# Patient Record
Sex: MALE | Race: WHITE | NOT HISPANIC OR LATINO | Employment: OTHER | URBAN - METROPOLITAN AREA
[De-identification: names, ages, dates, MRNs, and addresses within clinical notes are randomized per-mention and may not be internally consistent; named-entity substitution may affect disease eponyms.]

---

## 2017-11-03 ENCOUNTER — RADIANT APPOINTMENT (OUTPATIENT)
Dept: GENERAL RADIOLOGY | Facility: CLINIC | Age: 62
End: 2017-11-03
Attending: FAMILY MEDICINE
Payer: COMMERCIAL

## 2017-11-03 ENCOUNTER — OFFICE VISIT (OUTPATIENT)
Dept: FAMILY MEDICINE | Facility: CLINIC | Age: 62
End: 2017-11-03
Payer: COMMERCIAL

## 2017-11-03 VITALS
BODY MASS INDEX: 28.67 KG/M2 | HEIGHT: 71 IN | TEMPERATURE: 97.7 F | SYSTOLIC BLOOD PRESSURE: 148 MMHG | RESPIRATION RATE: 24 BRPM | OXYGEN SATURATION: 94 % | DIASTOLIC BLOOD PRESSURE: 90 MMHG | WEIGHT: 204.8 LBS | HEART RATE: 89 BPM

## 2017-11-03 DIAGNOSIS — Z00.00 ROUTINE GENERAL MEDICAL EXAMINATION AT A HEALTH CARE FACILITY: Primary | ICD-10-CM

## 2017-11-03 DIAGNOSIS — R05.9 COUGH: ICD-10-CM

## 2017-11-03 DIAGNOSIS — Z11.59 NEED FOR HEPATITIS C SCREENING TEST: ICD-10-CM

## 2017-11-03 DIAGNOSIS — Z23 NEED FOR PROPHYLACTIC VACCINATION AND INOCULATION AGAINST INFLUENZA: ICD-10-CM

## 2017-11-03 DIAGNOSIS — J18.9 PNEUMONIA OF LEFT LOWER LOBE DUE TO INFECTIOUS ORGANISM: ICD-10-CM

## 2017-11-03 DIAGNOSIS — R03.0 ELEVATED BLOOD PRESSURE READING WITHOUT DIAGNOSIS OF HYPERTENSION: ICD-10-CM

## 2017-11-03 DIAGNOSIS — Z12.11 SCREEN FOR COLON CANCER: ICD-10-CM

## 2017-11-03 LAB
ERYTHROCYTE [DISTWIDTH] IN BLOOD BY AUTOMATED COUNT: 12.6 % (ref 10–15)
HCT VFR BLD AUTO: 49.8 % (ref 40–53)
HGB BLD-MCNC: 17.3 G/DL (ref 13.3–17.7)
MCH RBC QN AUTO: 30.9 PG (ref 26.5–33)
MCHC RBC AUTO-ENTMCNC: 34.7 G/DL (ref 31.5–36.5)
MCV RBC AUTO: 89 FL (ref 78–100)
PLATELET # BLD AUTO: 143 10E9/L (ref 150–450)
RBC # BLD AUTO: 5.6 10E12/L (ref 4.4–5.9)
WBC # BLD AUTO: 16.8 10E9/L (ref 4–11)

## 2017-11-03 PROCEDURE — 99213 OFFICE O/P EST LOW 20 MIN: CPT | Mod: 25 | Performed by: FAMILY MEDICINE

## 2017-11-03 PROCEDURE — 86803 HEPATITIS C AB TEST: CPT | Performed by: FAMILY MEDICINE

## 2017-11-03 PROCEDURE — 99396 PREV VISIT EST AGE 40-64: CPT | Performed by: FAMILY MEDICINE

## 2017-11-03 PROCEDURE — 80053 COMPREHEN METABOLIC PANEL: CPT | Performed by: FAMILY MEDICINE

## 2017-11-03 PROCEDURE — 85027 COMPLETE CBC AUTOMATED: CPT | Performed by: FAMILY MEDICINE

## 2017-11-03 PROCEDURE — 80061 LIPID PANEL: CPT | Performed by: FAMILY MEDICINE

## 2017-11-03 PROCEDURE — 71020 XR CHEST 2 VW: CPT

## 2017-11-03 PROCEDURE — 87522 HEPATITIS C REVRS TRNSCRPJ: CPT | Performed by: FAMILY MEDICINE

## 2017-11-03 PROCEDURE — 36415 COLL VENOUS BLD VENIPUNCTURE: CPT | Performed by: FAMILY MEDICINE

## 2017-11-03 RX ORDER — AZITHROMYCIN 250 MG/1
TABLET, FILM COATED ORAL
Qty: 6 TABLET | Refills: 0 | Status: SHIPPED | OUTPATIENT
Start: 2017-11-03 | End: 2017-11-13

## 2017-11-03 RX ORDER — ALBUTEROL SULFATE 90 UG/1
2 AEROSOL, METERED RESPIRATORY (INHALATION) EVERY 6 HOURS PRN
Qty: 1 INHALER | Refills: 0 | Status: ON HOLD | OUTPATIENT
Start: 2017-11-03 | End: 2018-09-21

## 2017-11-03 RX ORDER — PREDNISONE 20 MG/1
40 TABLET ORAL DAILY
Qty: 10 TABLET | Refills: 0 | Status: SHIPPED | OUTPATIENT
Start: 2017-11-03 | End: 2017-11-08

## 2017-11-03 ASSESSMENT — PAIN SCALES - GENERAL: PAINLEVEL: NO PAIN (0)

## 2017-11-03 NOTE — PROGRESS NOTES
"  SUBJECTIVE:   CC: Rk Longoria is an 62 year old male who presents for preventative health visit.     Healthy Habits:    Do you get at least three servings of calcium containing foods daily (dairy, green leafy vegetables, etc.)? {YES/NO, DAIRY INTAKE:165011::\"yes\"}    Amount of exercise or daily activities, outside of work: {AMOUNT EXERCISE:426178}    Problems taking medications regularly {Yes /No default:949459::\"No\"}    Medication side effects: {Yes /No default.:283993::\"No\"}    Have you had an eye exam in the past two years? {YESNOBLANK:371043}    Do you see a dentist twice per year? {YESNOBLANK:033169}    Do you have sleep apnea, excessive snoring or daytime drowsiness?{YESNOBLANK:288038}    {Outside tests to abstract? :696444}    {additional problems to add (Optional):790920}    Today's PHQ-2 Score:   PHQ-2 ( 1999 Pfizer) 11/3/2017 1/8/2014   Q1: Little interest or pleasure in doing things 2 0   Q2: Feeling down, depressed or hopeless 0 0   PHQ-2 Score 2 0   Q1: Little interest or pleasure in doing things More than half the days -   Q2: Feeling down, depressed or hopeless Not at all -   PHQ-2 Score 2 -     {PHQ-2 LOOK IN ASSESSMENTS :444074}  Abuse: Current or Past(Physical, Sexual or Emotional)- {YES/NO/NA:516623}  Do you feel safe in your environment - {YES/NO/NA:685966}    Social History   Substance Use Topics     Smoking status: Current Every Day Smoker     Packs/day: 1.50     Smokeless tobacco: Never Used     Alcohol use No     {ETOH AUDIT:301380}    Last PSA: No results found for: PSA    Reviewed orders with patient. Reviewed health maintenance and updated orders accordingly - {Yes/No:222352::\"Yes\"}  {Chronicprobdata (Optional):919259}    Reviewed and updated as needed this visit by clinical staff         Reviewed and updated as needed this visit by Provider        {HISTORY OPTIONS (Optional):088438}    ROS:  {MALE ROS-adult preventive care package:814607::\"C: NEGATIVE for fever, chills, change in " "weight\",\"I: NEGATIVE for worrisome rashes, moles or lesions\",\"E: NEGATIVE for vision changes or irritation\",\"ENT: NEGATIVE for ear, mouth and throat problems\",\"R: NEGATIVE for significant cough or SOB\",\"CV: NEGATIVE for chest pain, palpitations or peripheral edema\",\"GI: NEGATIVE for nausea, abdominal pain, heartburn, or change in bowel habits\",\" male: negative for dysuria, hematuria, decreased urinary stream, erectile dysfunction, urethral discharge\",\"M: NEGATIVE for significant arthralgias or myalgia\",\"N: NEGATIVE for weakness, dizziness or paresthesias\",\"P: NEGATIVE for changes in mood or affect\"}    OBJECTIVE:   There were no vitals taken for this visit.  EXAM:  {Exam Choices:039015}    ASSESSMENT/PLAN:   {Diag Picklist:413124}    COUNSELING:  {MALE COUNSELING MESSAGES:410307::\"Reviewed preventive health counseling, as reflected in patient instructions\"}    {BP Counseling- Complete if BP >= 120/80  (Optional):097925}     reports that he has been smoking.  He has been smoking about 1.50 packs per day. He has never used smokeless tobacco.  {Tobacco Cessation -- Complete if patient is a smoker (Optional):369334}  Estimated body mass index is 29.29 kg/(m^2) as calculated from the following:    Height as of 8/26/16: 5' 11\" (1.803 m).    Weight as of 8/26/16: 210 lb (95.3 kg).   {Weight Management Plan (ACO) Complete if BMI is abnormal-  Ages 18-64  BMI >24.9.  Age 65+ with BMI <23 or >30 (Optional):937362}    Counseling Resources:  ATP IV Guidelines  Pooled Cohorts Equation Calculator  FRAX Risk Assessment  ICSI Preventive Guidelines  Dietary Guidelines for Americans, 2010  USDA's MyPlate  ASA Prophylaxis  Lung CA Screening    Darrin Mendez MD  [facility]  "

## 2017-11-03 NOTE — PROGRESS NOTES
SUBJECTIVE:   CC: Rk Longoria is an 62 year old male who presents for preventative health visit.     Physical   Annual:     Getting at least 3 servings of Calcium per day::  Yes    Bi-annual eye exam::  NO    Dental care twice a year::  NO    Sleep apnea or symptoms of sleep apnea::  Daytime drowsiness    Taking medications regularly::  Yes    Medication side effects::  None    Acute Illness   Acute illness concerns: cough  Onset: at least 1 week    Fever: YES    Chills/Sweats: YES    Headache (location?): YES    Sinus Pressure:no    Conjunctivitis:  no    Ear Pain: no    Rhinorrhea: YES    Congestion: YES- chest     Sore Throat: YES- occasionally      Cough: YES-productive of clear sputum    Wheeze: YES    Decreased Appetite: no    Nausea: no    Vomiting: no    Diarrhea:  no    Dysuria/Freq.: no    Fatigue/Achiness: YES    Sick/Strep Exposure: no     Therapies Tried and outcome: vitamin BHARGAV     Has had pneumonia before, feels similar.  Did recently have flu shot, multiple illness in the home over the last few weeks.  Feeling poorly for the last 8 days.  Feeling warm and feverish, unsure if derrek fever.  Does feel a bit short of breath, no wheezing.  Markedly low energy.      Does smoke, trying to cut back.    Today's PHQ-2 Score:   PHQ-2 ( 1999 Pfizer) 11/3/2017   Q1: Little interest or pleasure in doing things 2   Q2: Feeling down, depressed or hopeless 0   PHQ-2 Score 2   Q1: Little interest or pleasure in doing things More than half the days   Q2: Feeling down, depressed or hopeless Not at all   PHQ-2 Score 2   Abuse: Current or Past(Physical, Sexual or Emotional)- NO  Do you feel safe in your environment - YES    Social History   Substance Use Topics     Smoking status: Current Every Day Smoker     Packs/day: 1.50     Smokeless tobacco: Never Used     Alcohol use No     The patient does not drink >3 drinks per day nor >7 drinks per week.    Last PSA: No results found for: PSA    Reviewed orders with patient.  "Reviewed health maintenance and updated orders accordingly - Yes  Labs reviewed in EPIC    Reviewed and updated as needed this visit by clinical staff  Tobacco  Allergies  Meds  Problems  Med Hx  Surg Hx  Fam Hx  Soc Hx          Reviewed and updated as needed this visit by Provider            Review of Systems  CONSTITUTIONAL:fatigue  I: NEGATIVE for worrisome rashes, moles or lesions  E: NEGATIVE for vision changes or irritation  ENT: NEGATIVE for ear, mouth and throat problems  RESP:NEGATIVE for significant cough or SOB and POSITIVE for cough-productive, dyspnea on exertion and wheezing  CV: NEGATIVE for chest pain, palpitations or peripheral edema  GI: NEGATIVE for nausea, abdominal pain, heartburn, or change in bowel habits   male: negative for dysuria, hematuria, decreased urinary stream, erectile dysfunction, urethral discharge  M: NEGATIVE for significant arthralgias or myalgia  N: NEGATIVE for weakness, dizziness or paresthesias  P: NEGATIVE for changes in mood or affect    OBJECTIVE:   /90 (BP Location: Right arm, Patient Position: Chair, Cuff Size: Adult Regular)  Pulse 89  Temp 97.7  F (36.5  C) (Oral)  Resp 24  Ht 5' 11\" (1.803 m)  Wt 204 lb 12.8 oz (92.9 kg)  SpO2 94%  BMI 28.56 kg/m2    Physical Exam  GENERAL: healthy, alert and no distress  EYES: Eyes grossly normal to inspection, PERRL and conjunctivae and sclerae normal  HENT: ear canals and TM's normal, nose and mouth without ulcers or lesions  NECK: no adenopathy, no asymmetry, masses, or scars and thyroid normal to palpation  RESP:  rales L lower posterior, inspiratory wheezes bibasilar and decreased breath sounds L lower posterior  CV: regular rate and rhythm, normal S1 S2, no S3 or S4, no murmur, click or rub, no peripheral edema and peripheral pulses strong  ABDOMEN: soft, nontender, no hepatosplenomegaly, no masses and bowel sounds normal  MS: no gross musculoskeletal defects noted, no edema  SKIN: no suspicious lesions " or rashes  NEURO: Normal strength and tone, mentation intact and speech normal  PSYCH: mentation appears normal, affect normal/bright    ASSESSMENT/PLAN:   1. Screen for colon cancer    - Fecal colorectal cancer screen FIT - Future (S+30); Future    2. Need for hepatitis C screening test    - Hepatitis C Screen Reflex to HCV RNA Quant and Genotype    3. Need for prophylactic vaccination and inoculation against influenza  Recently done    4. Routine general medical examination at a health care facility    - Lipid panel reflex to direct LDL Fasting  - Comprehensive metabolic panel  - CBC with platelets    5. Cough  LLL PNA  - XR Chest 2 Views; Future  - OFFICE/OUTPT VISIT,EST,LEVL III    6. Pneumonia of left lower lobe due to infectious organism (H)  Suspect that he has underlying COPD given smokig history  - OFFICE/OUTPT VISIT,EST,LEVL III  - azithromycin (ZITHROMAX) 250 MG tablet; Two tablets first day, then one tablet daily for four days.  Dispense: 6 tablet; Refill: 0  - predniSONE (DELTASONE) 20 MG tablet; Take 2 tablets (40 mg) by mouth daily for 5 days  Dispense: 10 tablet; Refill: 0  - albuterol (PROAIR HFA/PROVENTIL HFA/VENTOLIN HFA) 108 (90 BASE) MCG/ACT Inhaler; Inhale 2 puffs into the lungs every 6 hours as needed for shortness of breath / dyspnea or wheezing  Dispense: 1 Inhaler; Refill: 0    7. Elevated blood pressure reading without diagnosis of hypertension    - OFFICE/OUTPT VISIT,EST,LEVL III    COUNSELING:   Reviewed preventive health counseling, as reflected in patient instructions    BP Screening:   Last 3 BP Readings:    BP Readings from Last 3 Encounters:   11/03/17 148/90   08/26/16 (!) 152/94   09/05/14 132/84       The following was recommended to the patient:  Re-screen within 4 weeks and recommend lifestyle modifications       reports that he has been smoking.  He has been smoking about 1.50 packs per day. He has never used smokeless tobacco.  Tobacco Cessation Action Plan: Information  "offered: Patient not interested at this time  Estimated body mass index is 28.56 kg/(m^2) as calculated from the following:    Height as of this encounter: 5' 11\" (1.803 m).    Weight as of this encounter: 204 lb 12.8 oz (92.9 kg).         Counseling Resources:  ATP IV Guidelines  Pooled Cohorts Equation Calculator  FRAX Risk Assessment  ICSI Preventive Guidelines  Dietary Guidelines for Americans, 2010  USDA's MyPlate  ASA Prophylaxis  Lung CA Screening    Darrin Mendez MD  [facility]  "

## 2017-11-03 NOTE — NURSING NOTE
"Chief Complaint   Patient presents with     Physical     Initial /90 (BP Location: Right arm, Patient Position: Chair, Cuff Size: Adult Regular)  Pulse 89  Temp 97.7  F (36.5  C) (Oral)  Resp 24  Ht 5' 11\" (1.803 m)  Wt 204 lb 12.8 oz (92.9 kg)  SpO2 94%  BMI 28.56 kg/m2 Estimated body mass index is 28.56 kg/(m^2) as calculated from the following:    Height as of this encounter: 5' 11\" (1.803 m).    Weight as of this encounter: 204 lb 12.8 oz (92.9 kg).  BP completed using cuff size regular right arm.    Lisa Magill, CMA    "

## 2017-11-03 NOTE — MR AVS SNAPSHOT
After Visit Summary   11/3/2017    Rk Longroia    MRN: [id number]           Patient Information     Date Of Birth          1955        Visit Information        Provider Department      11/3/2017 1:20 PM Darrin Mendez MD [facility]        Today's Diagnoses     Routine general medical examination at a health care facility    -  1    Screen for colon cancer        Need for hepatitis C screening test        Need for prophylactic vaccination and inoculation against influenza        Cough        Pneumonia of left lower lobe due to infectious organism (H)        Elevated blood pressure reading without diagnosis of hypertension          Care Instructions      Preventive Health Recommendations  Male Ages 50 - 64    Yearly exam:             See your health care provider every year in order to  o   Review health changes.   o   Discuss preventive care.    o   Review your medicines if your doctor has prescribed any.     Have a cholesterol test every 5 years, or more frequently if you are at risk for high cholesterol/heart disease.     Have a diabetes test (fasting glucose) every three years. If you are at risk for diabetes, you should have this test more often.     Have a colonoscopy at age 50, or have a yearly FIT test (stool test). These exams will check for colon cancer.      Talk with your health care provider about whether or not a prostate cancer screening test (PSA) is right for you.    You should be tested each year for STDs (sexually transmitted diseases), if you re at risk.     Shots: Get a flu shot each year. Get a tetanus shot every 10 years.     Nutrition:    Eat at least 5 servings of fruits and vegetables daily.     Eat whole-grain bread, whole-wheat pasta and brown rice instead of white grains and rice.     Talk to your provider about Calcium and Vitamin D.     Lifestyle    Exercise for at least 150 minutes a week (30 minutes a day, 5 days a week). This will help  "you control your weight and prevent disease.     Limit alcohol to one drink per day.     No smoking.     Wear sunscreen to prevent skin cancer.     See your dentist every six months for an exam and cleaning.     See your eye doctor every 1 to 2 years.            Follow-ups after your visit        Follow-up notes from your care team     Return in about 2 weeks (around 11/17/2017).      Future tests that were ordered for you today     Open Future Orders        Priority Expected Expires Ordered    Fecal colorectal cancer screen FIT - Future (S+30) Routine 11/24/2017 12/3/2017 11/3/2017            Who to contact     If you have questions or need follow up information about today's clinic visit or your schedule please contact [facility] directly at [phone].  Normal or non-critical lab and imaging results will be communicated to you by [employer], letter or phone within 4 business days after the clinic has received the results. If you do not hear from us within 7 days, please contact the clinic through [employer] or phone. If you have a critical or abnormal lab result, we will notify you by phone as soon as possible.  Submit refill requests through [employer] or call your pharmacy and they will forward the refill request to us. Please allow 3 business days for your refill to be completed.          Additional Information About Your Visit        [employer] Information     [employer] lets you send messages to your doctor, view your test results, renew your prescriptions, schedule appointments and more. To sign up, go to [email or web address] . Click on \"Log in\" on the left side of the screen, which will take you to the Welcome page. Then click on \"Sign up Now\" on the right side of the page.     You will be asked to enter the access code listed below, as well as some personal information. Please follow the directions to create your username and password.     Your access code is: [email or web address]  Expires: 2/1/2018  2:16 " "PM     Your access code will  in 90 days. If you need help or a new code, please call your [facility] clinic or [phone].        Care EveryWhere ID     This is your Care EveryWhere ID. This could be used by other organizations to access your [facility] medical records  [id number]        Your Vitals Were     Pulse Temperature Respirations Height Pulse Oximetry BMI (Body Mass Index)    89 97.7  F (36.5  C) (Oral) 24 5' 11\" (1.803 m) 94% 28.56 kg/m2       Blood Pressure from Last 3 Encounters:   17 148/90   16 (!) 152/94   14 132/84    Weight from Last 3 Encounters:   17 204 lb 12.8 oz (92.9 kg)   16 210 lb (95.3 kg)   14 204 lb (92.5 kg)              We Performed the Following     CBC with platelets     Comprehensive metabolic panel     Hepatitis C Screen Reflex to HCV RNA Quant and Genotype     Lipid panel reflex to direct LDL Fasting     OFFICE/OUTPT VISIT,RONA ALEX III          Today's Medication Changes          These changes are accurate as of: 11/3/17  2:16 PM.  If you have any questions, ask your nurse or doctor.               Start taking these medicines.        Dose/Directions    albuterol 108 (90 BASE) MCG/ACT Inhaler   Commonly known as:  PROAIR HFA/PROVENTIL HFA/VENTOLIN HFA   Used for:  Pneumonia of left lower lobe due to infectious organism (H)   Started by:  Darrin Mendez MD        Dose:  2 puff   Inhale 2 puffs into the lungs every 6 hours as needed for shortness of breath / dyspnea or wheezing   Quantity:  1 Inhaler   Refills:  0       azithromycin 250 MG tablet   Commonly known as:  ZITHROMAX   Used for:  Pneumonia of left lower lobe due to infectious organism (H)   Started by:  Darrin Mendez MD        Two tablets first day, then one tablet daily for four days.   Quantity:  6 tablet   Refills:  0       predniSONE 20 MG tablet   Commonly known as:  DELTASONE   Used for:  Pneumonia of left lower lobe due to infectious organism (H)   Started " by:  Darrin Mendez MD        Dose:  40 mg   Take 2 tablets (40 mg) by mouth daily for 5 days   Quantity:  10 tablet   Refills:  0            Where to get your medicines      These medications were sent to [id number] - [place], [place] - [address]  [address] [facility] [address]     Phone:  [phone]     albuterol 108 (90 BASE) MCG/ACT Inhaler    azithromycin 250 MG tablet    predniSONE 20 MG tablet                Primary Care Provider Office Phone # Fax #    Darrin Mendez [phone] [address] [facility] [address]        Equal Access to Services     [facility]: Hadii aad ku hadasho Soomaali, waaxda luqadaha, qaybta kaalmada adeegyada, waxsarah cano . So [facility] [phone].    ATENCIÓN: Si habla español, tiene a shipley disposición servicios gratuitos de asistencia lingüística. [facility] [phone].    We comply with applicable federal civil rights laws and [place] laws. We do not discriminate on the basis of race, color, national origin, age, disability, sex, sexual orientation, or gender identity.            Thank you!     Thank you for choosing [facility]  for your care. Our goal is always to provide you with excellent care. Hearing back from our patients is one way we can continue to improve our services. Please take a few minutes to complete the written survey that you may receive in the mail after your visit with us. Thank you!             Your Updated Medication List - Protect others around you: Learn how to safely use, store and throw away your medicines at www.disposemymeds.org.          This list is accurate as of: 11/3/17  2:16 PM.  Always use your most recent med list.                   Brand Name Dispense Instructions for use Diagnosis    albuterol 108 (90 BASE) MCG/ACT Inhaler    PROAIR HFA/PROVENTIL HFA/VENTOLIN HFA    1 Inhaler    Inhale 2 puffs into the lungs every 6 hours as  needed for shortness of breath / dyspnea or wheezing    Pneumonia of left lower lobe due to infectious organism (H)       aspirin 325 MG tablet      Take by mouth daily        azithromycin 250 MG tablet    ZITHROMAX    6 tablet    Two tablets first day, then one tablet daily for four days.    Pneumonia of left lower lobe due to infectious organism (H)       calcium carbonate-vitamin D 600-400 MG-UNIT Chew      Take by mouth 2 times daily        predniSONE 20 MG tablet    DELTASONE    10 tablet    Take 2 tablets (40 mg) by mouth daily for 5 days    Pneumonia of left lower lobe due to infectious organism (H)       TYLENOL 500 MG tablet   Generic drug:  acetaminophen      Take 500-1,000 mg by mouth every 6 hours as needed        vitamin D3 14733 UNITS capsule    CHOLECALCIFEROL    12 capsule    Take 1 capsule (50,000 Units) by mouth once a week    Unspecified vitamin D deficiency

## 2017-11-04 LAB
ALBUMIN SERPL-MCNC: 4 G/DL (ref 3.4–5)
ALP SERPL-CCNC: 81 U/L (ref 40–150)
ALT SERPL W P-5'-P-CCNC: 29 U/L (ref 0–70)
ANION GAP SERPL CALCULATED.3IONS-SCNC: 8 MMOL/L (ref 3–14)
AST SERPL W P-5'-P-CCNC: 22 U/L (ref 0–45)
BILIRUB SERPL-MCNC: 1 MG/DL (ref 0.2–1.3)
BUN SERPL-MCNC: 14 MG/DL (ref 7–30)
CALCIUM SERPL-MCNC: 9.2 MG/DL (ref 8.5–10.1)
CHLORIDE SERPL-SCNC: 102 MMOL/L (ref 94–109)
CHOLEST SERPL-MCNC: 156 MG/DL
CO2 SERPL-SCNC: 26 MMOL/L (ref 20–32)
CREAT SERPL-MCNC: 0.66 MG/DL (ref 0.66–1.25)
GFR SERPL CREATININE-BSD FRML MDRD: >90 ML/MIN/1.7M2
GLUCOSE SERPL-MCNC: 328 MG/DL (ref 70–99)
HDLC SERPL-MCNC: 38 MG/DL
LDLC SERPL CALC-MCNC: 78 MG/DL
NONHDLC SERPL-MCNC: 118 MG/DL
POTASSIUM SERPL-SCNC: 4.4 MMOL/L (ref 3.4–5.3)
PROT SERPL-MCNC: 8.2 G/DL (ref 6.8–8.8)
SODIUM SERPL-SCNC: 136 MMOL/L (ref 133–144)
TRIGL SERPL-MCNC: 202 MG/DL

## 2017-11-06 LAB — HCV AB SERPL QL IA: REACTIVE

## 2017-11-07 DIAGNOSIS — J18.9 PNEUMONIA OF LEFT LOWER LOBE DUE TO INFECTIOUS ORGANISM: ICD-10-CM

## 2017-11-07 NOTE — TELEPHONE ENCOUNTER
Pt calls requesting refill on his azithromycin for another 5 or 6 days. He states he is feeling much better after the abx and inhaler. Informed he may be asked to come in for an OV for the abx refill.    Ken Kan   11/07/17 10:23 AM

## 2017-11-08 RX ORDER — AZITHROMYCIN 250 MG/1
TABLET, FILM COATED ORAL
Qty: 6 TABLET | Refills: 0 | OUTPATIENT
Start: 2017-11-08

## 2017-11-08 NOTE — TELEPHONE ENCOUNTER
Disp Refills Start End DENEEN   azithromycin (ZITHROMAX) 250 MG tablet 6 tablet 0 11/3/2017  --   Sig: Two tablets first day, then one tablet daily for four days.     Requesting refill    Route to provider to review and advise    Martir RN Nurse Triage

## 2017-11-08 NOTE — TELEPHONE ENCOUNTER
Azithromycin remains active in the body for several days after Rx.  If still having issues pt will need to f/u.    Darrin Mendez MD

## 2017-11-11 LAB
HCV RNA SERPL NAA+PROBE-ACNC: ABNORMAL [IU]/ML
HCV RNA SERPL NAA+PROBE-LOG IU: 5.9 LOG IU/ML

## 2017-11-13 ENCOUNTER — OFFICE VISIT (OUTPATIENT)
Dept: FAMILY MEDICINE | Facility: CLINIC | Age: 62
End: 2017-11-13
Payer: COMMERCIAL

## 2017-11-13 VITALS
HEART RATE: 76 BPM | BODY MASS INDEX: 28.42 KG/M2 | SYSTOLIC BLOOD PRESSURE: 128 MMHG | WEIGHT: 203.8 LBS | TEMPERATURE: 98 F | RESPIRATION RATE: 20 BRPM | DIASTOLIC BLOOD PRESSURE: 78 MMHG

## 2017-11-13 DIAGNOSIS — B18.2 CHRONIC HEPATITIS C WITHOUT HEPATIC COMA (H): ICD-10-CM

## 2017-11-13 DIAGNOSIS — R73.9 ELEVATED BLOOD SUGAR: Primary | ICD-10-CM

## 2017-11-13 DIAGNOSIS — E11.65 TYPE 2 DIABETES MELLITUS WITH HYPERGLYCEMIA, WITHOUT LONG-TERM CURRENT USE OF INSULIN (H): ICD-10-CM

## 2017-11-13 LAB — HBA1C MFR BLD: 10.1 % (ref 4.3–6)

## 2017-11-13 PROCEDURE — 36415 COLL VENOUS BLD VENIPUNCTURE: CPT | Performed by: FAMILY MEDICINE

## 2017-11-13 PROCEDURE — 99214 OFFICE O/P EST MOD 30 MIN: CPT | Performed by: FAMILY MEDICINE

## 2017-11-13 PROCEDURE — 83036 HEMOGLOBIN GLYCOSYLATED A1C: CPT | Performed by: FAMILY MEDICINE

## 2017-11-13 RX ORDER — LISINOPRIL 2.5 MG/1
2.5 TABLET ORAL DAILY
Qty: 90 TABLET | Refills: 1 | Status: SHIPPED | OUTPATIENT
Start: 2017-11-13 | End: 2018-05-10

## 2017-11-13 NOTE — MR AVS SNAPSHOT
After Visit Summary   11/13/2017    Rk Longoria    MRN: [id number]           Patient Information     Date Of Birth          1955        Visit Information        Provider Department      11/13/2017 10:20 AM Darrin Mendez MD [facility]        Today's Diagnoses     Elevated blood sugar    -  1    Type 2 diabetes mellitus with hyperglycemia, without long-term current use of insulin (H)        Chronic hepatitis C without hepatic coma (H)           Follow-ups after your visit        Additional Services     DIABETES EDUCATOR REFERRAL       Your provider has referred you to Diabetes Education: FMG: Diabetes Education - All [facility] [phone]   [email or web address]     If an urgent visit is needed or A1C is above 12, Care Team to call the Diabetes  Education Team at [phone] or send an In Basket message to the Diabetes Education Pool (P DIAB ED-PATIENT CARE).    This is a New Diagnosis: Initial group DSMT - 10 hours.    Type of diabetes is Type 2 - On Oral Medication                                                          A1C is: Lab Results       Component                Value               Date                       A1C                      10.1                11/13/2017            If an urgent visit is needed or A1C is above 12, Care Team to call the diabetes education team at [phone] or send a message to the diabetes education pool (P DIAB ED-PATIENT CARE).    Diabetes education focus: Comprehensive Knowledge Assessment and Instruction      Education needs: None                                                                                                                                                      Please be aware that coverage of these services is subject to the terms and limitations of your health insurance plan.  Call member services at your health plan to determine Diabetes Self-Management  Training benefits and ask which blood glucose monitor brands are covered by your plan.      Please bring the following to your appointment:    -   List of current medications   -   List of blood glucose monitor brands that are covered by your insurance plan  -   Blood glucose monitor and log book  -   Food records for the 3 days prior to your visit            GASTROENTEROLOGY ADULT REF CONSULT ONLY       Preferred Location: [place] [facility] [phone]      Please be aware that coverage of these services is subject to the terms and limitations of your health insurance plan.  Call member services at your health plan with any benefit or coverage questions.  Any procedures must be performed at a [facility] facility OR coordinated by your clinic's referral office.    Please bring the following with you to your appointment:    (1) Any X-Rays, CTs or MRIs which have been performed.  Contact the facility where they were done to arrange for  prior to your scheduled appointment.    (2) List of current medications   (3) This referral request   (4) Any documents/labs given to you for this referral                  Follow-up notes from your care team     Return in about 1 month (around 12/13/2017).      Who to contact     If you have questions or need follow up information about today's clinic visit or your schedule please contact [facility] directly at [phone].  Normal or non-critical lab and imaging results will be communicated to you by MyChart, letter or phone within 4 business days after the clinic has received the results. If you do not hear from us within 7 days, please contact the clinic through MyChart or phone. If you have a critical or abnormal lab result, we will notify you by phone as soon as possible.  Submit refill requests through [facility] [employer] or call your pharmacy and they will forward the refill request to us. Please allow 3 business days for your refill to be completed.          Additional  "Information About Your Visit        MyChart Information     Pulse lets you send messages to your doctor, view your test results, renew your prescriptions, schedule appointments and more. To sign up, go to [email or web address] . Click on \"Log in\" on the left side of the screen, which will take you to the Welcome page. Then click on \"Sign up Now\" on the right side of the page.     You will be asked to enter the access code listed below, as well as some personal information. Please follow the directions to create your username and password.     Your access code is: [email or web address]  Expires: 2018  1:16 PM     Your access code will  in 90 days. If you need help or a new code, please call your [place] clinic or [phone].        Care EveryWhere ID     This is your Care EveryWhere ID. This could be used by other organizations to access your [place] medical records  [id number]        Your Vitals Were     Pulse Temperature Respirations BMI (Body Mass Index)          76 98  F (36.7  C) (Oral) 20 28.42 kg/m2         Blood Pressure from Last 3 Encounters:   17 128/78   17 148/90   16 (!) 152/94    Weight from Last 3 Encounters:   17 203 lb 12.8 oz (92.4 kg)   17 204 lb 12.8 oz (92.9 kg)   16 210 lb (95.3 kg)              We Performed the Following     DIABETES EDUCATOR REFERRAL     GASTROENTEROLOGY ADULT REF CONSULT ONLY     Hemoglobin A1c          Today's Medication Changes          These changes are accurate as of: 17 11:34 AM.  If you have any questions, ask your nurse or doctor.               Start taking these medicines.        Dose/Directions    lisinopril 2.5 MG tablet   Commonly known as:  PRINIVIL/Zestril   Used for:  Type 2 diabetes mellitus with hyperglycemia, without long-term current use of insulin (H)   Started by:  Darrin Mendez MD        Dose:  2.5 mg   Take 1 tablet (2.5 mg) by mouth daily   Quantity:  90 tablet   Refills:  1       metFORMIN " 500 MG tablet   Commonly known as:  GLUCOPHAGE   Used for:  Type 2 diabetes mellitus with hyperglycemia, without long-term current use of insulin (H)   Started by:  Darrin Mendez MD        Dose:  1000 mg   Take 2 tablets (1,000 mg) by mouth 2 times daily (with meals)   Quantity:  360 tablet   Refills:  1            Where to get your medicines      These medications were sent to [id number] - [place], [place] - [address]  [address] [address] [facility] [address]     Phone:  [phone]     lisinopril 2.5 MG tablet    metFORMIN 500 MG tablet                Primary Care Provider Office Phone # Fax #    Darrin Mendez [phone] [address]  [facility] [address]        Equal Access to Services     SHANTAL JOSUE : Hadii kings pablo hadasho Soomaali, waaxda luqadaha, qaybta kaalmada adeegyada, wilfrid taylor hayneeta cano . So [facility] [phone].    ATENCIÓN: Si habla español, tiene a shipley disposición servicios gratuitos de asistencia lingüística. Llame al [phone].    We comply with applicable federal civil rights laws and [place] laws. We do not discriminate on the basis of race, color, national origin, age, disability, sex, sexual orientation, or gender identity.            Thank you!     Thank you for choosing [facility]  for your care. Our goal is always to provide you with excellent care. Hearing back from our patients is one way we can continue to improve our services. Please take a few minutes to complete the written survey that you may receive in the mail after your visit with us. Thank you!             Your Updated Medication List - Protect others around you: Learn how to safely use, store and throw away your medicines at www.disposemymeds.org.          This list is accurate as of: 11/13/17 11:34 AM.  Always use your most recent med list.                   Brand Name Dispense Instructions for use Diagnosis    albuterol 108  (90 BASE) MCG/ACT Inhaler    PROAIR HFA/PROVENTIL HFA/VENTOLIN HFA    1 Inhaler    Inhale 2 puffs into the lungs every 6 hours as needed for shortness of breath / dyspnea or wheezing    Pneumonia of left lower lobe due to infectious organism (H)       aspirin 325 MG tablet      Take by mouth daily        calcium carbonate-vitamin D 600-400 MG-UNIT Chew      Take by mouth 2 times daily        lisinopril 2.5 MG tablet    PRINIVIL/Zestril    90 tablet    Take 1 tablet (2.5 mg) by mouth daily    Type 2 diabetes mellitus with hyperglycemia, without long-term current use of insulin (H)       metFORMIN 500 MG tablet    GLUCOPHAGE    360 tablet    Take 2 tablets (1,000 mg) by mouth 2 times daily (with meals)    Type 2 diabetes mellitus with hyperglycemia, without long-term current use of insulin (H)       TYLENOL 500 MG tablet   Generic drug:  acetaminophen      Take 500-1,000 mg by mouth every 6 hours as needed

## 2017-11-13 NOTE — NURSING NOTE
"Chief Complaint   Patient presents with     Results     follow up for positive lab test       Initial /78  Pulse 76  Temp 98  F (36.7  C) (Oral)  Resp 20  Wt 203 lb 12.8 oz (92.4 kg)  BMI 28.42 kg/m2 Estimated body mass index is 28.42 kg/(m^2) as calculated from the following:    Height as of 11/3/17: 5' 11\" (1.803 m).    Weight as of this encounter: 203 lb 12.8 oz (92.4 kg).  Medication Reconciliation: complete   Nubia Soto CMA (AAMA)      "

## 2017-11-13 NOTE — PROGRESS NOTES
HPI    SUBJECTIVE:   Rk Longoria is a 62 year old male who presents to clinic today for the following health issues:    Lab results    Recovering alcoholic - sober for 10 years.  Also lots of drugs for a while - injected cocaine.  Never shared a needle, but did share a spoon.  Feeling well, no acute conerns.  No abd pain, n/v.    Does feel he's more thristy than not, drinks a lot.  Does urinate a lot.  Up once nightly to urinate.  No n/v, abd pain, tingling/numbness in hands/feet.  Does walk dog nearly daily., but less in the winter.    Family doesn't communicate about health well, thinks brother has diabetes, but is not sure.  MGM also had.  Not parents (as far as he knows).    Review of Systems   Constitutional: Negative.    Respiratory: Negative.    Cardiovascular: Negative.    Gastrointestinal: Negative for abdominal pain.   Genitourinary: Positive for frequency.   Endo/Heme/Allergies: Positive for polydipsia.         Physical Exam   Constitutional: He is oriented to person, place, and time and well-developed, well-nourished, and in no distress.   Eyes: Conjunctivae and EOM are normal. No scleral icterus.   Cardiovascular: Normal rate, regular rhythm and normal heart sounds.    Pulmonary/Chest: Effort normal and breath sounds normal.   Musculoskeletal: He exhibits no edema.   Neurological: He is alert and oriented to person, place, and time.   Skin: Skin is warm and dry.   Vitals reviewed.    (R73.9) Elevated blood sugar  (primary encounter diagnosis)  Comment: cross check  Plan: Hemoglobin A1c            (E11.65) Type 2 diabetes mellitus with hyperglycemia, without long-term current use of insulin (H)  Comment: Confirmed by A1c.  Starting on meds, advised to start ASA, starting low dose ACE-I.  Arrangeed for DM ed.  Will follow up in 1m to review new meds, start statin  Plan: metFORMIN (GLUCOPHAGE) 500 MG tablet,         lisinopril (PRINIVIL/ZESTRIL) 2.5 MG tablet,         DIABETES EDUCATOR REFERRAL,            (B18.2) Chronic hepatitis C without hepatic coma (H)  Comment: confrimed active infection through viral load.  Awaiting serotyping, referred to GI  Plan: GASTROENTEROLOGY         ADULT REF CONSULT ONLY        RTC in     Darrin Mendez MD

## 2017-11-17 ASSESSMENT — ENCOUNTER SYMPTOMS
CARDIOVASCULAR NEGATIVE: 1
CONSTITUTIONAL NEGATIVE: 1
RESPIRATORY NEGATIVE: 1
FREQUENCY: 1
POLYDIPSIA: 1
ABDOMINAL PAIN: 0

## 2017-11-27 DIAGNOSIS — E11.65 TYPE 2 DIABETES MELLITUS WITH HYPERGLYCEMIA, WITHOUT LONG-TERM CURRENT USE OF INSULIN (H): Primary | ICD-10-CM

## 2017-11-27 NOTE — TELEPHONE ENCOUNTER
[facility] calling     Pt requesting BS testing meter and testing supplies     He would like this before OV with DM educator on 11/29.      OK for this RX?    Margi Guzman, RN

## 2017-11-29 ENCOUNTER — ALLIED HEALTH/NURSE VISIT (OUTPATIENT)
Dept: EDUCATION SERVICES | Facility: CLINIC | Age: 62
End: 2017-11-29
Payer: COMMERCIAL

## 2017-11-29 DIAGNOSIS — E11.9 DIABETES MELLITUS, TYPE 2 (H): Primary | ICD-10-CM

## 2017-11-29 PROCEDURE — G0108 DIAB MANAGE TRN  PER INDIV: HCPCS | Performed by: DIETITIAN, REGISTERED

## 2017-11-29 NOTE — PROGRESS NOTES
"Diabetes Self Management Training: Initial Assessment Visit for Newly Diagnosed Patients (Complete AADE Goals Flowsheet)    Rk Longoria presents today for education related to Type 2 diabetes.    He is accompanied by son    Patient's diabetes management related comments/concerns: has completely changed his diet, is following Coy's diet    Patient's emotional response to diabetes: expresses readiness to learn and concern for health and well-being    Patient would like this visit to be focused around the following diabetes-related behaviors and goals: Healthy Eating and Monitoring    ASSESSMENT:  Patient Problem List and Family Medical History reviewed for relevant medical history, current medical status, and diabetes risk factors.    Current Diabetes Management per Patient:      Diabetes Medication(s)     Biguanides Sig    metFORMIN (GLUCOPHAGE) 500 MG tablet Take 2 tablets (1,000 mg) by mouth 2 times daily (with meals)        Side effects? Yes - some GI discomfort in the morning  Currently taking 1 tab with breakfast and 2 tabs with evening meal    Past Diabetes Education: Newly diagnosed    Patient glucose self monitoring as follows: pt bought a meter on own- reviewed technique today   BG meter: OTC from [employer]   BG result at today's visit: 107 mg/dl     BG values are: unable to assess  Patient's most recent   Lab Results   Component Value Date    A1C 10.1 11/13/2017    is not meeting goal of <7.0    Nutrition:  Patient eats 3 meals per day and has a very low intake of carbohydrates. Has made significant changes to cut out sweets (icecream, pastries,etc). States \"I miss the carbs\".    Patient brought in the following food record:          Biggest Challenge to Healthy Eating: portion control and cravings for sweets    Physical Activity:    Limitations: none  Walks dog daily    Diabetes Risk Factors:  family history, age over 45 years, hypertension, hyperlipidemia and inactivity    Diabetes Complications:  Not " "discussed today.    Vitals:  There were no vitals taken for this visit.  Estimated body mass index is 28.42 kg/(m^2) as calculated from the following:    Height as of 11/3/17: 1.803 m (5' 11\").    Weight as of 11/13/17: 92.4 kg (203 lb 12.8 oz).   Last 3 BP:   BP Readings from Last 3 Encounters:   11/13/17 128/78   11/03/17 148/90   08/26/16 (!) 152/94       History   Smoking Status     Current Every Day Smoker     Packs/day: 1.50   Smokeless Tobacco     Never Used       Labs:  Lab Results   Component Value Date    A1C 10.1 11/13/2017     Lab Results   Component Value Date     11/03/2017     Lab Results   Component Value Date    LDL 78 11/03/2017     HDL Cholesterol   Date Value Ref Range Status   11/03/2017 38 (L) >39 mg/dL Final   ]  GFR Estimate   Date Value Ref Range Status   11/03/2017 >90 >60 mL/min/1.7m2 Final     Comment:     Non  GFR Calc     GFR Estimate If Black   Date Value Ref Range Status   11/03/2017 >90 >60 mL/min/1.7m2 Final     Comment:      GFR Calc     Lab Results   Component Value Date    CR 0.66 11/03/2017     No results found for: MICROALBUMIN    Socio/Economic Considerations:    Support system: family    Health Beliefs and Attitudes:   Patient Activation Measure Survey Score:  No flowsheet data found.    Stage of Change: ACTION (Actively working towards change)      Diabetes knowledge and skills assessment:     Patient is knowledgeable in diabetes management concepts related to: new dx    Patient needs further education on the following diabetes management concepts: Healthy Eating, Being Active, Monitoring, Taking Medication, Problem Solving, Reducing Risks and Healthy Coping    Barriers to Learning Assessment: No Barriers identified    Based on learning assessment above, most appropriate setting for further diabetes education would be: Group class or Individual setting.    INTERVENTION:   Education provided today on:  AADE Self-Care Behaviors:  Healthy " Eating: carbohydrate counting, consistency in amount, composition, and timing of food intake, weight reduction, portion control, plate planning method and label reading  Being Active: relationship to blood glucose and describe appropriate activity program  Monitoring: purpose, proper technique, log and interpret results, individual blood glucose targets, frequency of monitoring and proper sharps disposal  Taking Medication: action of prescribed medication and when to take medications  Problem Solving: high blood glucose - causes, signs/symptoms, treatment and prevention  Reducing Risks: A1C - goals, relating to blood glucose levels, how often to check, lipids levels and goals and blood pressure and goals  Healthy Coping: utilize support systems  Patient was instructed on [facility] over the counter meter and was able to provide an accurate return demonstration. Patient's blood glucose reading today was 107 mg/dL.    Opportunities for ongoing education and support in diabetes-self management were discussed.    Pt verbalized understanding of concepts discussed and recommendations provided today.       Education Materials Provided:  [facility] Understanding Diabetes Booklet, Safe Disposal Options for Needles & Syringes and BG Log Sheet    PLAN:  See Patient Instructions for co-developed, patient-stated behavior change goals.  Meal Plan Recommendation: eat 3 meals a day, use portion control, use plate planning method, Aim for 3 carb servings at meals and 0-1 carb servings at snacks.   Exercise / activity plan: increase walking as able  Check blood sugars 1-2x daily  AVS printed and provided to patient today.    FOLLOW-UP:  Recommend CDE follow up in 8 weeks- patient will call to schedule.   Chart routed to referring provider.    Ongoing plan for education and support: Written resources (magazines, books, etc.), Follow-up visit with diabetes educator  and Follow-up with primary care provider    Arlene Fernandez RD, CDE  Diabetes      Time Spent: 60 minutes  Encounter Type: Individual    Any diabetes medication dose changes were made via the CDE Protocol and Collaborative Practice Agreement with the patient's primary care provider. A copy of this encounter was shared with the provider.

## 2017-11-29 NOTE — MR AVS SNAPSHOT
"              After Visit Summary   2017    Rk Longoria    MRN: [id number]           Patient Information     Date Of Birth          1955        Visit Information        Provider Department      2017 1:30 PM Arlene Fernandez [facility]        Today's Diagnoses     Diabetes mellitus, type 2 (H)    -  1       Follow-ups after your visit        Who to contact     If you have questions or need follow up information about today's clinic visit or your schedule please contact [facility] directly at [phone].  Normal or non-critical lab and imaging results will be communicated to you by Vittanahart, letter or phone within 4 business days after the clinic has received the results. If you do not hear from us within 7 days, please contact the clinic through [employer] or phone. If you have a critical or abnormal lab result, we will notify you by phone as soon as possible.  Submit refill requests through [employer] or call your pharmacy and they will forward the refill request to us. Please allow 3 business days for your refill to be completed.          Additional Information About Your Visit        MyChart Information     Ne[employer]Quest lets you send messages to your doctor, view your test results, renew your prescriptions, schedule appointments and more. To sign up, go to [email or web address] . Click on \"Log in\" on the left side of the screen, which will take you to the Welcome page. Then click on \"Sign up Now\" on the right side of the page.     You will be asked to enter the access code listed below, as well as some personal information. Please follow the directions to create your username and password.     Your access code is: [email or web address]  Expires: 2018  1:16 PM     Your access code will  in 90 days. If you need help or a new code, please call your [facility] or [phone].        Care EveryWhere ID     This is your Care EveryWhere ID. This could be used by " other organizations to access your [facility] medical records  [id number]         Blood Pressure from Last 3 Encounters:   11/13/17 128/78   11/03/17 148/90   08/26/16 (!) 152/94    Weight from Last 3 Encounters:   11/13/17 92.4 kg (203 lb 12.8 oz)   11/03/17 92.9 kg (204 lb 12.8 oz)   08/26/16 95.3 kg (210 lb)              We Performed the Following     DIABETES EDUCATION - Individual  []        Primary Care Provider Office Phone # Fax #    Darrin Dave Mendez [phone]       [id number]  [address] [facility] [address]        Equal Access to Services     SHANTAL JOSUE : Hadii kings pablo hadasho Soomaali, waaxda luqadaha, qaybta kaalmada adeegyada, wilfrid cano . So [facility] [phone].    ATENCIÓN: Si habla español, tiene a shipley disposición servicios gratuitos de asistencia lingüística. Llame al [phone].    We comply with applicable federal civil rights laws and [place] laws. We do not discriminate on the basis of race, color, national origin, age, disability, sex, sexual orientation, or gender identity.            Thank you!     Thank you for choosing [facility]  for your care. Our goal is always to provide you with excellent care. Hearing back from our patients is one way we can continue to improve our services. Please take a few minutes to complete the written survey that you may receive in the mail after your visit with us. Thank you!             Your Updated Medication List - Protect others around you: Learn how to safely use, store and throw away your medicines at www.disposemymeds.org.          This list is accurate as of: 11/29/17  5:31 PM.  Always use your most recent med list.                   Brand Name Dispense Instructions for use Diagnosis    albuterol 108 (90 BASE) MCG/ACT Inhaler    PROAIR HFA/PROVENTIL HFA/VENTOLIN HFA    1 Inhaler    Inhale 2 puffs into the lungs every 6 hours as needed for shortness of breath / dyspnea or  wheezing    Pneumonia of left lower lobe due to infectious organism (H)       aspirin 325 MG tablet      Take by mouth daily        blood glucose lancets standard    no brand specified    100 each    Use to test blood sugar 1 times daily or as directed.    Type 2 diabetes mellitus with hyperglycemia, without long-term current use of insulin (H)       blood glucose monitoring meter device kit    no brand specified    1 kit    Use to test blood sugar 1 times daily or as directed.    Type 2 diabetes mellitus with hyperglycemia, without long-term current use of insulin (H)       blood glucose monitoring test strip    no brand specified    100 strip    Use to test blood sugars 1 times daily or as directed    Type 2 diabetes mellitus with hyperglycemia, without long-term current use of insulin (H)       calcium carbonate-vitamin D 600-400 MG-UNIT Chew      Take by mouth 2 times daily        lisinopril 2.5 MG tablet    PRINIVIL/Zestril    90 tablet    Take 1 tablet (2.5 mg) by mouth daily    Type 2 diabetes mellitus with hyperglycemia, without long-term current use of insulin (H)       metFORMIN 500 MG tablet    GLUCOPHAGE    360 tablet    Take 2 tablets (1,000 mg) by mouth 2 times daily (with meals)    Type 2 diabetes mellitus with hyperglycemia, without long-term current use of insulin (H)       TYLENOL 500 MG tablet   Generic drug:  acetaminophen      Take 500-1,000 mg by mouth every 6 hours as needed

## 2018-02-13 ENCOUNTER — TELEPHONE (OUTPATIENT)
Dept: FAMILY MEDICINE | Facility: CLINIC | Age: 63
End: 2018-02-13

## 2018-02-13 NOTE — TELEPHONE ENCOUNTER
Panel Management Review      Patient has the following on his problem list:     Diabetes    ASA: Passed    Last A1C  Lab Results   Component Value Date    A1C 10.1 11/13/2017     A1C tested: FAILED    Last LDL:    Lab Results   Component Value Date    CHOL 156 11/03/2017     Lab Results   Component Value Date    HDL 38 11/03/2017     Lab Results   Component Value Date    LDL 78 11/03/2017     Lab Results   Component Value Date    TRIG 202 11/03/2017     No results found for: CHOLHDLRATIO  Lab Results   Component Value Date    NHDL 118 11/03/2017       Is the patient on a Statin? NO             Is the patient on Aspirin? YES    Medications     Salicylates    aspirin 325 MG tablet          Last three blood pressure readings:  BP Readings from Last 3 Encounters:   11/13/17 128/78   11/03/17 148/90   08/26/16 (!) 152/94       Date of last diabetes office visit: 11/13/17     Tobacco History:     History   Smoking Status     Current Every Day Smoker     Packs/day: 1.50   Smokeless Tobacco     Never Used           Composite cancer screening  Chart review shows that this patient is due/due soon for the following Colonoscopy  Summary:    Patient is due/failing the following:   A1C and COLONOSCOPY    Action needed:   Routed to provider for review.    Type of outreach:    Phone, spoke to patient.       Questions for provider review:    Patient states that he is following the Atkins diet and has lost 25 pounds.  Glucose level remains around 100-115 and he's feeling good.  Patient was seen 3 months ago; should diabetic follow up be scheduled                                                                                                                                    Nubia Soto CMA ([facility])    Chart routed to Provider.

## 2018-02-16 ENCOUNTER — OFFICE VISIT (OUTPATIENT)
Dept: FAMILY MEDICINE | Facility: CLINIC | Age: 63
End: 2018-02-16
Payer: COMMERCIAL

## 2018-02-16 VITALS
DIASTOLIC BLOOD PRESSURE: 80 MMHG | HEIGHT: 71 IN | WEIGHT: 195 LBS | HEART RATE: 58 BPM | OXYGEN SATURATION: 99 % | SYSTOLIC BLOOD PRESSURE: 130 MMHG | RESPIRATION RATE: 16 BRPM | BODY MASS INDEX: 27.3 KG/M2 | TEMPERATURE: 97.6 F

## 2018-02-16 DIAGNOSIS — E11.65 TYPE 2 DIABETES MELLITUS WITH HYPERGLYCEMIA, WITHOUT LONG-TERM CURRENT USE OF INSULIN (H): Primary | ICD-10-CM

## 2018-02-16 PROBLEM — E11.9 TYPE 2 DIABETES MELLITUS WITHOUT COMPLICATION (H): Status: ACTIVE | Noted: 2017-11-13

## 2018-02-16 LAB
HBA1C MFR BLD: 5.4 % (ref 4.3–6)
TSH SERPL DL<=0.005 MIU/L-ACNC: 0.93 MU/L (ref 0.4–4)

## 2018-02-16 PROCEDURE — 36415 COLL VENOUS BLD VENIPUNCTURE: CPT | Performed by: FAMILY MEDICINE

## 2018-02-16 PROCEDURE — 83036 HEMOGLOBIN GLYCOSYLATED A1C: CPT | Performed by: FAMILY MEDICINE

## 2018-02-16 PROCEDURE — 99214 OFFICE O/P EST MOD 30 MIN: CPT | Performed by: FAMILY MEDICINE

## 2018-02-16 PROCEDURE — 84443 ASSAY THYROID STIM HORMONE: CPT | Performed by: FAMILY MEDICINE

## 2018-02-16 PROCEDURE — 82043 UR ALBUMIN QUANTITATIVE: CPT | Performed by: FAMILY MEDICINE

## 2018-02-16 ASSESSMENT — ENCOUNTER SYMPTOMS
SPUTUM PRODUCTION: 0
PALPITATIONS: 0
BLURRED VISION: 0
CONSTITUTIONAL NEGATIVE: 1
GASTROINTESTINAL NEGATIVE: 1
SENSORY CHANGE: 0
DOUBLE VISION: 0
TINGLING: 0

## 2018-02-16 NOTE — LETTER
[address]  February 19, 2018      [place] [address]           Phone :  [phone]          Fax:  [phone]  Rk Longoria  [address]  [facility] [address]      Dear Rk,    Your thyroid test was normal.  Your urine microalbumin is mildly elevated.  Urine microalbumin is a measurement of proteins in your urine - these are usually filtered out of our urine by our kidneys.  This number going up is a sign of VERY early kidney damage, usually from high blood pressure or diabetes.  There is actually no functional change to your kidney function at this point, this is a very early warning marker.  Focusing on blood pressure and sugar control, and keeping them within target ranges, will ensure that this doesn't progress to the point of derrek kidney failure.    Let me know if you have any questions,      Sincerely,    Darrin Mendez MD/wes

## 2018-02-16 NOTE — PROGRESS NOTES
History of Present Illness     Diet:  Diabetic and Carbohydrate counting  Frequency of exercise:  4-5 days/week  Duration of exercise:  15-30 minutes  Taking medications regularly:  Yes  Medication side effects:  None  Additional concerns today:  No      BP Readings from Last 2 Encounters:   11/13/17 128/78   11/03/17 148/90     Hemoglobin A1C (%)   Date Value   11/13/2017 10.1 (H)     LDL Cholesterol Calculated (mg/dL)   Date Value   11/03/2017 78       Amount of exercise or physical activity: 2-3 days/week for an average of 15-30 minutes    Problems taking medications regularly: No    Medication side effects: none, although did have early GI distress with metformin    Diet: diabetic    Has drastically cut back on sugar, really reducing other carbs, more fruits and vegetables..  Will be getting all teeth pulled and getting dentures.  Still smoking.  No numbness or tingling in feet.  No CP, palpitations, shortness of breath, vision changes, HA.   Is taking 500 mg metformin BID.  Fasting BS in the 115-120.        Review of Systems   Constitutional: Negative.    Eyes: Negative for blurred vision and double vision.   Respiratory: Negative for sputum production.    Cardiovascular: Negative for chest pain and palpitations.   Gastrointestinal: Negative.    Neurological: Negative for tingling and sensory change.         Physical Exam   Constitutional: He is oriented to person, place, and time and well-developed, well-nourished, and in no distress.   Eyes: Conjunctivae and EOM are normal.   Cardiovascular: Normal rate, regular rhythm and normal heart sounds.    Pulses:       Dorsalis pedis pulses are 2+ on the right side, and 2+ on the left side.   Pulmonary/Chest: Effort normal and breath sounds normal.   Musculoskeletal: He exhibits no edema.   Neurological: He is alert and oriented to person, place, and time. He displays no weakness. Gait normal.   Reflex Scores:       Patellar reflexes are 2+ on the right side and 2+ on  the left side.       Achilles reflexes are 2+ on the right side and 2+ on the left side.  Nl filament and proprioception in feet SWATI   Skin: Skin is warm and dry.   Skin on feet healthy.  No wounds, callous.  Multiple toes with onychomycosis.   Vitals reviewed.        Answers for HPI/ROS submitted by the patient on 2/16/2018   PHQ-2 Score: 0    (E11.65) Type 2 diabetes mellitus with hyperglycemia, without long-term current use of insulin (H)  (primary encounter diagnosis)  Comment: dramatic improvement in A1c, is only using 1/2 dose of metformin, will formalize that dose.  Encouraged to quit smoking.  Plan: Albumin Random Urine Quantitative with Creat         Ratio, Hemoglobin A1c, TSH with free T4 reflex,        metFORMIN (GLUCOPHAGE) 500 MG tablet              RTC in     Darrin Mendez MD

## 2018-02-16 NOTE — NURSING NOTE
"Chief Complaint   Patient presents with     Diabetes       Initial /80 (BP Location: Right arm, Patient Position: Chair, Cuff Size: Adult Large)  Pulse 58  Temp 97.6  F (36.4  C) (Oral)  Resp 16  Ht 5' 11\" (1.803 m)  Wt 195 lb (88.5 kg)  SpO2 99%  BMI 27.2 kg/m2 Estimated body mass index is 27.2 kg/(m^2) as calculated from the following:    Height as of this encounter: 5' 11\" (1.803 m).    Weight as of this encounter: 195 lb (88.5 kg).  Medication Reconciliation: complete   Nubia Soto CMA ([facility])      "

## 2018-02-16 NOTE — MR AVS SNAPSHOT
"              After Visit Summary   2018    Rk Longoria    MRN: [id number]           Patient Information     Date Of Birth          1955        Visit Information        Provider Department      2018 10:20 AM Darrin Mendez MD [facility]        Today's Diagnoses     Type 2 diabetes mellitus with hyperglycemia, without long-term current use of insulin (H)    -  1       Follow-ups after your visit        Follow-up notes from your care team     Return in about 3 months (around 2018).      Who to contact     If you have questions or need follow up information about today's clinic visit or your schedule please contact [facility] directly at [phone].  Normal or non-critical lab and imaging results will be communicated to you by MyChart, letter or phone within 4 business days after the clinic has received the results. If you do not hear from us within 7 days, please contact the clinic through Tweetwallhart or phone. If you have a critical or abnormal lab result, we will notify you by phone as soon as possible.  Submit refill requests through CoffeeTable or call your pharmacy and they will forward the refill request to us. Please allow 3 business days for your refill to be completed.          Additional Information About Your Visit        MyChart Information     CoffeeTable lets you send messages to your doctor, view your test results, renew your prescriptions, schedule appointments and more. To sign up, go to [email or web address] . Click on \"Log in\" on the left side of the screen, which will take you to the Welcome page. Then click on \"Sign up Now\" on the right side of the page.     You will be asked to enter the access code listed below, as well as some personal information. Please follow the directions to create your username and password.     Your access code is: [email or web address]  Expires: 2018 11:07 AM     Your access code will  in 90 days. If you need " "help or a new code, please call your [place] clinic or [phone].        Care EveryWhere ID     This is your Care EveryWhere ID. This could be used by other organizations to access your [place] medical records  [id number]        Your Vitals Were     Pulse Temperature Respirations Height Pulse Oximetry BMI (Body Mass Index)    58 97.6  F (36.4  C) (Oral) 16 5' 11\" (1.803 m) 99% 27.2 kg/m2       Blood Pressure from Last 3 Encounters:   02/16/18 130/80   11/13/17 128/78   11/03/17 148/90    Weight from Last 3 Encounters:   02/16/18 195 lb (88.5 kg)   11/13/17 203 lb 12.8 oz (92.4 kg)   11/03/17 204 lb 12.8 oz (92.9 kg)              We Performed the Following     Albumin Random Urine Quantitative with Creat Ratio     Hemoglobin A1c     TSH with free T4 reflex          Today's Medication Changes          These changes are accurate as of 2/16/18 11:07 AM.  If you have any questions, ask your nurse or doctor.               These medicines have changed or have updated prescriptions.        Dose/Directions    metFORMIN 500 MG tablet   Commonly known as:  GLUCOPHAGE   This may have changed:  how much to take   Used for:  Type 2 diabetes mellitus with hyperglycemia, without long-term current use of insulin (H)   Changed by:  Darrin Mendez MD        Dose:  500 mg   Take 1 tablet (500 mg) by mouth 2 times daily (with meals)   Quantity:  360 tablet   Refills:  1                Primary Care Provider Office Phone # Fax #    Darrin Mendez [phone]       [address] [facility] [address]        Equal Access to Services     [facility]: Hadii kings ku hadasho Soomaali, waaxda luqadaha, qaybta kaalmada wilfrid frazier. So [facility] [phone].    ATENCIÓN: Si habla español, tiene a shipley disposición servicios gratuitos de asistencia lingüística. Llame al [phone].    We comply with applicable federal civil rights laws and [place] laws. We do not " discriminate on the basis of race, color, national origin, age, disability, sex, sexual orientation, or gender identity.            Thank you!     Thank you for choosing [facility]  for your care. Our goal is always to provide you with excellent care. Hearing back from our patients is one way we can continue to improve our services. Please take a few minutes to complete the written survey that you may receive in the mail after your visit with us. Thank you!             Your Updated Medication List - Protect others around you: Learn how to safely use, store and throw away your medicines at www.disposemymeds.org.          This list is accurate as of 2/16/18 11:07 AM.  Always use your most recent med list.                   Brand Name Dispense Instructions for use Diagnosis    albuterol 108 (90 BASE) MCG/ACT Inhaler    PROAIR HFA/PROVENTIL HFA/VENTOLIN HFA    1 Inhaler    Inhale 2 puffs into the lungs every 6 hours as needed for shortness of breath / dyspnea or wheezing    Pneumonia of left lower lobe due to infectious organism (H)       aspirin 325 MG tablet      Take by mouth daily        blood glucose lancets standard    no brand specified    100 each    Use to test blood sugar 1 times daily or as directed.    Type 2 diabetes mellitus with hyperglycemia, without long-term current use of insulin (H)       blood glucose monitoring meter device kit    no brand specified    1 kit    Use to test blood sugar 1 times daily or as directed.    Type 2 diabetes mellitus with hyperglycemia, without long-term current use of insulin (H)       blood glucose monitoring test strip    no brand specified    100 strip    Use to test blood sugars 1 times daily or as directed    Type 2 diabetes mellitus with hyperglycemia, without long-term current use of insulin (H)       calcium carbonate-vitamin D 600-400 MG-UNIT Chew      Take by mouth 2 times daily        lisinopril 2.5 MG tablet    PRINIVIL/Zestril    90 tablet     Take 1 tablet (2.5 mg) by mouth daily    Type 2 diabetes mellitus with hyperglycemia, without long-term current use of insulin (H)       metFORMIN 500 MG tablet    GLUCOPHAGE    360 tablet    Take 1 tablet (500 mg) by mouth 2 times daily (with meals)    Type 2 diabetes mellitus with hyperglycemia, without long-term current use of insulin (H)       TYLENOL 500 MG tablet   Generic drug:  acetaminophen      Take 500-1,000 mg by mouth every 6 hours as needed

## 2018-02-17 LAB
CREAT UR-MCNC: 98 MG/DL
MICROALBUMIN UR-MCNC: 24 MG/L
MICROALBUMIN/CREAT UR: 24.34 MG/G CR (ref 0–17)

## 2018-07-03 ENCOUNTER — TELEPHONE (OUTPATIENT)
Dept: FAMILY MEDICINE | Facility: CLINIC | Age: 63
End: 2018-07-03

## 2018-07-03 NOTE — TELEPHONE ENCOUNTER
Panel Management Review      Patient has the following on his problem list:     Depression / Dysthymia review    Measure:  Needs PHQ-9 score of 4 or less during index window.  Administer PHQ-9 and if score is 5 or more, send encounter to provider for next steps.    5 - 7 month window range:     PHQ-9 SCORE 11/7/2013   Total Score 9       If PHQ-9 recheck is 5 or more, route to provider for next steps.    Patient is due for:  PHQ9    Diabetes    ASA: Passed    Last A1C  Lab Results   Component Value Date    A1C 5.4 02/16/2018    A1C 10.1 11/13/2017     A1C tested: Passed    Last LDL:    Lab Results   Component Value Date    CHOL 156 11/03/2017     Lab Results   Component Value Date    HDL 38 11/03/2017     Lab Results   Component Value Date    LDL 78 11/03/2017     Lab Results   Component Value Date    TRIG 202 11/03/2017     No results found for: DYLAN  Lab Results   Component Value Date    NHDL 118 11/03/2017       Is the patient on a Statin? NO             Is the patient on Aspirin? YES    Medications     Salicylates    aspirin 325 MG tablet          Last three blood pressure readings:  BP Readings from Last 3 Encounters:   02/16/18 130/80   11/13/17 128/78   11/03/17 148/90       Date of last diabetes office visit: 2/16/18     Tobacco History:     History   Smoking Status     Current Every Day Smoker     Packs/day: 1.00   Smokeless Tobacco     Never Used           IVD   ASA: Passed    Last LDL:    Lab Results   Component Value Date    CHOL 156 11/03/2017     Lab Results   Component Value Date    HDL 38 11/03/2017     Lab Results   Component Value Date    LDL 78 11/03/2017     Lab Results   Component Value Date    TRIG 202 11/03/2017      No results found for: DYLAN     Is the patient on a Statin? NO   Is the patient on Aspirin? YES                  Medications     Salicylates    aspirin 325 MG tablet          Last three blood pressure readings:  BP Readings from Last 3 Encounters:   02/16/18  130/80   11/13/17 128/78   11/03/17 148/90        Tobacco History:     History   Smoking Status     Current Every Day Smoker     Packs/day: 1.00   Smokeless Tobacco     Never Used         Composite cancer screening  Chart review shows that this patient is due/due soon for the following Colonoscopy  Summary:    Patient is due/failing the following:   PHQ9 and STATIN    Action needed:   Patient needs office visit for Depression, Diabetes & lipids.    Type of outreach:    Phone, spoke to patient.  Patient currently does not have insurance.  Will call back when he gets his insurance squared away.    Questions for provider review:    None                                                                                                                                    Nubia Soto CMA ([facility])    Chart routed to Provider - FYI regarding the reason for not being seen.

## 2018-08-11 DIAGNOSIS — E11.65 TYPE 2 DIABETES MELLITUS WITH HYPERGLYCEMIA, WITHOUT LONG-TERM CURRENT USE OF INSULIN (H): ICD-10-CM

## 2018-08-13 NOTE — TELEPHONE ENCOUNTER
"Requested Prescriptions   Pending Prescriptions Disp Refills     lisinopril (PRINIVIL/ZESTRIL) 2.5 MG tablet [Pharmacy Med Name: LISINOPRIL 2.5MG TABS] 90 tablet 1    Last Written Prescription Date:  5/10/18  Last Fill Quantity: 90,  # refills: 1   Last Office Visit: 2/16/2018   Future Office Visit:      Sig: TAKE ONE TABLET BY MOUTH EVERY DAY    ACE Inhibitors (Including Combos) Protocol Passed    8/11/2018  7:19 PM       Passed - Blood pressure under 140/90 in past 12 months    BP Readings from Last 3 Encounters:   02/16/18 130/80   11/13/17 128/78   11/03/17 148/90                Passed - Recent (12 mo) or future (30 days) visit within the authorizing provider's specialty    Patient had office visit in the last 12 months or has a visit in the next 30 days with authorizing provider or within the authorizing provider's specialty.  See \"Patient Info\" tab in inbasket, or \"Choose Columns\" in Meds & Orders section of the refill encounter.           Passed - Patient is age 18 or older       Passed - Normal serum creatinine on file in past 12 months    Recent Labs   Lab Test  11/03/17   1413   CR  0.66            Passed - Normal serum potassium on file in past 12 months    Recent Labs   Lab Test  11/03/17   1413   POTASSIUM  4.4               "

## 2018-08-14 RX ORDER — LISINOPRIL 2.5 MG/1
TABLET ORAL
Qty: 90 TABLET | Refills: 1 | OUTPATIENT
Start: 2018-08-14

## 2018-09-17 DIAGNOSIS — E11.65 TYPE 2 DIABETES MELLITUS WITH HYPERGLYCEMIA, WITHOUT LONG-TERM CURRENT USE OF INSULIN (H): ICD-10-CM

## 2018-09-18 NOTE — TELEPHONE ENCOUNTER
Pt due for Dm appt.  Spoke with patient and scheduled for appt tomorrow and he has enough until then  Ronal Jon RN, BSN

## 2018-09-18 NOTE — TELEPHONE ENCOUNTER
"Requested Prescriptions   Pending Prescriptions Disp Refills     metFORMIN (GLUCOPHAGE) 500 MG tablet [Pharmacy Med Name: METFORMIN HCL 500MG TABS] 360 tablet 1    Last Written Prescription Date:  2/16/18  Last Fill Quantity: 360,  # refills: 1   Last Office Visit: 2/16/2018   Future Office Visit:      Sig: TAKE TWO TABLETS BY MOUTH TWICE A DAY WITH MEALS    Biguanide Agents Failed    9/17/2018 11:18 AM       Failed - Blood pressure less than 140/90 in past 6 months    BP Readings from Last 3 Encounters:   02/16/18 130/80   11/13/17 128/78   11/03/17 148/90                Failed - Patient has documented A1c within the specified period of time.    If HgbA1C is 8 or greater, it needs to be on file within the past 3 months.  If less than 8, must be on file within the past 6 months.     Recent Labs   Lab Test  02/16/18   1030   A1C  5.4            Failed - Recent (6 mo) or future (30 days) visit within the authorizing provider's specialty    Patient had office visit in the last 6 months or has a visit in the next 30 days with authorizing provider or within the authorizing provider's specialty.  See \"Patient Info\" tab in inbasket, or \"Choose Columns\" in Meds & Orders section of the refill encounter.           Passed - Patient has documented LDL within the past 12 mos.    Recent Labs   Lab Test  11/03/17   1413   LDL  78            Passed - Patient has had a Microalbumin in the past 15 mos.    Recent Labs   Lab Test  02/16/18   1029   MICROL  24   UMALCR  24.34*            Passed - Patient is age 10 or older       Passed - Patient's CR is NOT>1.4 OR Patient's EGFR is NOT<45 within past 12 mos.    Recent Labs   Lab Test  11/03/17   1413   GFRESTIMATED  >90   GFRESTBLACK  >90       Recent Labs   Lab Test  11/03/17   1413   CR  0.66            Passed - Patient does NOT have a diagnosis of CHF.          "

## 2018-09-19 ENCOUNTER — OFFICE VISIT (OUTPATIENT)
Dept: FAMILY MEDICINE | Facility: CLINIC | Age: 63
End: 2018-09-19
Payer: COMMERCIAL

## 2018-09-19 VITALS
HEART RATE: 60 BPM | DIASTOLIC BLOOD PRESSURE: 70 MMHG | TEMPERATURE: 98.1 F | RESPIRATION RATE: 20 BRPM | WEIGHT: 191.7 LBS | BODY MASS INDEX: 26.74 KG/M2 | SYSTOLIC BLOOD PRESSURE: 138 MMHG

## 2018-09-19 DIAGNOSIS — R39.12 BENIGN PROSTATIC HYPERPLASIA WITH WEAK URINARY STREAM: ICD-10-CM

## 2018-09-19 DIAGNOSIS — E11.65 TYPE 2 DIABETES MELLITUS WITH HYPERGLYCEMIA, WITHOUT LONG-TERM CURRENT USE OF INSULIN (H): ICD-10-CM

## 2018-09-19 DIAGNOSIS — Z12.11 SCREEN FOR COLON CANCER: ICD-10-CM

## 2018-09-19 DIAGNOSIS — N40.1 BENIGN PROSTATIC HYPERPLASIA WITH WEAK URINARY STREAM: ICD-10-CM

## 2018-09-19 DIAGNOSIS — N52.02 CORPORO-VENOUS OCCLUSIVE ERECTILE DYSFUNCTION: ICD-10-CM

## 2018-09-19 DIAGNOSIS — Z23 NEED FOR PROPHYLACTIC VACCINATION AGAINST STREPTOCOCCUS PNEUMONIAE (PNEUMOCOCCUS): ICD-10-CM

## 2018-09-19 DIAGNOSIS — Z23 NEED FOR PROPHYLACTIC VACCINATION AND INOCULATION AGAINST INFLUENZA: ICD-10-CM

## 2018-09-19 DIAGNOSIS — E11.9 TYPE 2 DIABETES MELLITUS WITHOUT COMPLICATION, WITHOUT LONG-TERM CURRENT USE OF INSULIN (H): Primary | ICD-10-CM

## 2018-09-19 LAB — HBA1C MFR BLD: 5.6 % (ref 0–5.6)

## 2018-09-19 PROCEDURE — 83036 HEMOGLOBIN GLYCOSYLATED A1C: CPT | Performed by: FAMILY MEDICINE

## 2018-09-19 PROCEDURE — 99214 OFFICE O/P EST MOD 30 MIN: CPT | Performed by: FAMILY MEDICINE

## 2018-09-19 PROCEDURE — 36415 COLL VENOUS BLD VENIPUNCTURE: CPT | Performed by: FAMILY MEDICINE

## 2018-09-19 RX ORDER — TAMSULOSIN HYDROCHLORIDE 0.4 MG/1
0.4 CAPSULE ORAL DAILY
Qty: 30 CAPSULE | Refills: 1 | Status: SHIPPED | OUTPATIENT
Start: 2018-09-19 | End: 2018-11-28

## 2018-09-19 RX ORDER — SILDENAFIL 100 MG/1
50-100 TABLET, FILM COATED ORAL DAILY PRN
Qty: 12 TABLET | Refills: 1 | Status: SHIPPED | OUTPATIENT
Start: 2018-09-19 | End: 2021-07-20

## 2018-09-19 ASSESSMENT — ENCOUNTER SYMPTOMS
CONSTITUTIONAL NEGATIVE: 1
CARDIOVASCULAR NEGATIVE: 1
RESPIRATORY NEGATIVE: 1

## 2018-09-19 NOTE — PROGRESS NOTES
"HPI    SUBJECTIVE:   Rk Longoria is a 63 year old male who presents to clinic today for the following health issues:    Diabetes Follow-up      Patient is checking blood sugars: rarely.  Results range from 116 to 220    Diabetic concerns: None     Symptoms of hypoglycemia (low blood sugar): none     Paresthesias (numbness or burning in feet) or sores: Yes lower legs and feet on occation     Date of last diabetic eye exam: over 1 year    BP Readings from Last 2 Encounters:   02/16/18 130/80   11/13/17 128/78     Hemoglobin A1C (%)   Date Value   02/16/2018 5.4   11/13/2017 10.1 (H)     LDL Cholesterol Calculated (mg/dL)   Date Value   11/03/2017 78       Diabetes Management Resources  Hypertension Follow-up      Outpatient blood pressures are not being checked.    Low Salt Diet: not monitoring salt      Amount of exercise or physical activity: 6-7 days/week for an average of 15-30 minutes    Problems taking medications regularly: No    Medication side effects: none    Diet: diabetic    Walking daily, watching diet.  Is still smoking     Having issues with erections - \"blood flow\" for about 3 months.  Also noting weaker stream when urinating.  Will get up once nightly to urinate, but does specifically restrict fluids in the evening.      Review of Systems   Constitutional: Negative.    Respiratory: Negative.    Cardiovascular: Negative.    Genitourinary: Negative.          Physical Exam   Constitutional: He is oriented to person, place, and time and well-developed, well-nourished, and in no distress.   Eyes: Conjunctivae and EOM are normal.   Cardiovascular: Normal rate, regular rhythm and normal heart sounds.    Pulmonary/Chest: Effort normal and breath sounds normal.   Musculoskeletal: He exhibits no edema.   Neurological: He is alert and oriented to person, place, and time.   Skin: Skin is warm and dry.   Vitals reviewed.    (E11.9) Type 2 diabetes mellitus without complication, without long-term current use " of insulin (H)  (primary encounter diagnosis)  Comment: stable, continue on low dose metformin  Plan: Hemoglobin A1c            (Z12.11) Screen for colon cancer  Comment:   Plan: Fecal colorectal cancer screen (FIT)            (Z23) Need for prophylactic vaccination and inoculation against influenza  Comment:   Plan: getting today    (Z23) Need for prophylactic vaccination against Streptococcus pneumoniae (pneumococcus)  Comment:   Plan: getting today    (N40.1,  R39.12) Benign prostatic hyperplasia with weak urinary stream  Comment: trial, will also try for ED as first line  Plan: tamsulosin (FLOMAX) 0.4 MG capsule            (N52.02) Corporo-venous occlusive erectile dysfunction  Comment: available if flomax doesn't help  Plan: sildenafil (VIAGRA) 100 MG tablet            (E11.65) Type 2 diabetes mellitus with hyperglycemia, without long-term current use of insulin (H)  Comment: refill  Plan: metFORMIN (GLUCOPHAGE) 500 MG tablet              RTC in 1m    Darrin Mendez MD

## 2018-09-19 NOTE — MR AVS SNAPSHOT
After Visit Summary   9/19/2018    Rk Longoria    MRN: [id number]           Patient Information     Date Of Birth          1955        Visit Information        Provider Department      9/19/2018 10:20 AM Darrin Mendez MD [facility]        Today's Diagnoses     Type 2 diabetes mellitus without complication, without long-term current use of insulin (H)    -  1    Screen for colon cancer        Need for prophylactic vaccination and inoculation against influenza        Need for prophylactic vaccination against Streptococcus pneumoniae (pneumococcus)        Benign prostatic hyperplasia with weak urinary stream        Corporo-venous occlusive erectile dysfunction        Type 2 diabetes mellitus with hyperglycemia, without long-term current use of insulin (H)           Follow-ups after your visit        Follow-up notes from your care team     Return in about 1 month (around 10/19/2018).      Future tests that were ordered for you today     Open Future Orders        Priority Expected Expires Ordered    Fecal colorectal cancer screen (FIT) Routine 10/10/2018 12/12/2018 9/19/2018            Who to contact     If you have questions or need follow up information about today's clinic visit or your schedule please contact [facility] directly at [phone].  Normal or non-critical lab and imaging results will be communicated to you by MyChart, letter or phone within 4 business days after the clinic has received the results. If you do not hear from us within 7 days, please contact the clinic through MyChart or phone. If you have a critical or abnormal lab result, we will notify you by phone as soon as possible.  Submit refill requests through [employer] or call your pharmacy and they will forward the refill request to us. Please allow 3 business days for your refill to be completed.          Additional Information About Your Visit        MyChart Information      "[employer] lets you send messages to your doctor, view your test results, renew your prescriptions, schedule appointments and more. To sign up, go to [email or web address] . Click on \"Log in\" on the left side of the screen, which will take you to the Welcome page. Then click on \"Sign up Now\" on the right side of the page.     You will be asked to enter the access code listed below, as well as some personal information. Please follow the directions to create your username and password.     Your access code is: [email or web address]  Expires: 2018 11:04 AM     Your access code will  in 90 days. If you need help or a new code, please call your [facility] clinic or [phone].        Care EveryWhere ID     This is your Care EveryWhere ID. This could be used by other organizations to access your [facility] medical records  [id number]        Your Vitals Were     Pulse Temperature Respirations BMI (Body Mass Index)          60 98.1  F (36.7  C) (Oral) 20 26.74 kg/m2         Blood Pressure from Last 3 Encounters:   18 138/70   18 130/80   17 128/78    Weight from Last 3 Encounters:   18 191 lb 11.2 oz (87 kg)   18 195 lb (88.5 kg)   17 203 lb 12.8 oz (92.4 kg)              We Performed the Following     Hemoglobin A1c          Today's Medication Changes          These changes are accurate as of 18 11:04 AM.  If you have any questions, ask your nurse or doctor.               Start taking these medicines.        Dose/Directions    sildenafil 100 MG tablet   Commonly known as:  VIAGRA   Used for:  Corporo-venous occlusive erectile dysfunction   Started by:  Darrin Mendez MD        Dose:   mg   Take 0.5-1 tablets ( mg) by mouth daily as needed   Quantity:  12 tablet   Refills:  1       tamsulosin 0.4 MG capsule   Commonly known as:  FLOMAX   Used for:  Benign prostatic hyperplasia with weak urinary stream   Started by:  Darrin Mendez MD        Dose:  0.4 " mg   Take 1 capsule (0.4 mg) by mouth daily   Quantity:  30 capsule   Refills:  1            Where to get your medicines      These medications were sent to [id number] - [place], [place] - [address]  [address] , [facility] [id number]     Phone:  [phone]     metFORMIN 500 MG tablet    sildenafil 100 MG tablet    tamsulosin 0.4 MG capsule                Primary Care Provider Office Phone # Fax #    Darrin Mendez [phone] [address] [facility] [id number]        Equal Access to Services     [facility]: Hadii aad ku hadasho Soomaali, waaxda luqadaha, qaybta kaalmada adeegyada, waxay nadiain haychungn jose cano . So [facility] [phone].    ATENCIÓN: Si habla español, tiene a shipley disposición servicios gratuitos de asistencia lingüística. [facility] [phone].    We comply with applicable federal civil rights laws and [place] laws. We do not discriminate on the basis of race, color, national origin, age, disability, sex, sexual orientation, or gender identity.            Thank you!     Thank you for choosing [facility]  for your care. Our goal is always to provide you with excellent care. Hearing back from our patients is one way we can continue to improve our services. Please take a few minutes to complete the written survey that you may receive in the mail after your visit with us. Thank you!             Your Updated Medication List - Protect others around you: Learn how to safely use, store and throw away your medicines at www.disposemymeds.org.          This list is accurate as of 9/19/18 11:04 AM.  Always use your most recent med list.                   Brand Name Dispense Instructions for use Diagnosis    albuterol 108 (90 Base) MCG/ACT inhaler    PROAIR HFA/PROVENTIL HFA/VENTOLIN HFA    1 Inhaler    Inhale 2 puffs into the lungs every 6 hours as needed for shortness of breath / dyspnea or wheezing    Pneumonia of left  lower lobe due to infectious organism (H)       aspirin 325 MG tablet      Take by mouth daily        blood glucose lancets standard    no brand specified    100 each    Use to test blood sugar 1 times daily or as directed.    Type 2 diabetes mellitus with hyperglycemia, without long-term current use of insulin (H)       blood glucose monitoring meter device kit    no brand specified    1 kit    Use to test blood sugar 1 times daily or as directed.    Type 2 diabetes mellitus with hyperglycemia, without long-term current use of insulin (H)       blood glucose monitoring test strip    no brand specified    100 strip    Use to test blood sugars 1 times daily or as directed    Type 2 diabetes mellitus with hyperglycemia, without long-term current use of insulin (H)       calcium carbonate-vitamin D 600-400 MG-UNIT Chew      Take by mouth 2 times daily        lisinopril 2.5 MG tablet    PRINIVIL/Zestril    90 tablet    TAKE ONE TABLET BY MOUTH EVERY DAY    Type 2 diabetes mellitus with hyperglycemia, without long-term current use of insulin (H)       metFORMIN 500 MG tablet    GLUCOPHAGE    360 tablet    Take 1 tablet (500 mg) by mouth 2 times daily (with meals)    Type 2 diabetes mellitus with hyperglycemia, without long-term current use of insulin (H)       sildenafil 100 MG tablet    VIAGRA    12 tablet    Take 0.5-1 tablets ( mg) by mouth daily as needed    Corporo-venous occlusive erectile dysfunction       tamsulosin 0.4 MG capsule    FLOMAX    30 capsule    Take 1 capsule (0.4 mg) by mouth daily    Benign prostatic hyperplasia with weak urinary stream       TYLENOL 500 MG tablet   Generic drug:  acetaminophen      Take 500-1,000 mg by mouth every 6 hours as needed

## 2018-09-21 ENCOUNTER — RESULTS ONLY (OUTPATIENT)
Dept: ADMINISTRATIVE | Facility: CLINIC | Age: 63
End: 2018-09-21

## 2018-09-21 ENCOUNTER — APPOINTMENT (OUTPATIENT)
Dept: CARDIOLOGY | Facility: CLINIC | Age: 63
End: 2018-09-21
Attending: INTERNAL MEDICINE
Payer: COMMERCIAL

## 2018-09-21 ENCOUNTER — HOSPITAL ENCOUNTER (INPATIENT)
Facility: CLINIC | Age: 63
LOS: 1 days | Discharge: HOME | End: 2018-09-22
Attending: INTERNAL MEDICINE | Admitting: INTERNAL MEDICINE
Payer: COMMERCIAL

## 2018-09-21 ENCOUNTER — HOSPITAL ENCOUNTER (EMERGENCY)
Facility: CLINIC | Age: 63
Discharge: ANOTHER HOSPITAL | End: 2018-09-21
Attending: EMERGENCY MEDICINE | Admitting: EMERGENCY MEDICINE
Payer: COMMERCIAL

## 2018-09-21 ENCOUNTER — APPOINTMENT (OUTPATIENT)
Dept: PHYSICAL THERAPY | Facility: CLINIC | Age: 63
End: 2018-09-21
Attending: INTERNAL MEDICINE
Payer: COMMERCIAL

## 2018-09-21 ENCOUNTER — APPOINTMENT (OUTPATIENT)
Dept: GENERAL RADIOLOGY | Facility: CLINIC | Age: 63
End: 2018-09-21
Attending: EMERGENCY MEDICINE
Payer: COMMERCIAL

## 2018-09-21 VITALS
BODY MASS INDEX: 29.55 KG/M2 | HEIGHT: 68 IN | WEIGHT: 195 LBS | DIASTOLIC BLOOD PRESSURE: 49 MMHG | SYSTOLIC BLOOD PRESSURE: 65 MMHG | OXYGEN SATURATION: 93 %

## 2018-09-21 DIAGNOSIS — I21.02 ST ELEVATION MYOCARDIAL INFARCTION INVOLVING LEFT ANTERIOR DESCENDING (LAD) CORONARY ARTERY (H): Primary | ICD-10-CM

## 2018-09-21 DIAGNOSIS — I21.11 ST ELEVATION MYOCARDIAL INFARCTION INVOLVING RIGHT CORONARY ARTERY (H): ICD-10-CM

## 2018-09-21 DIAGNOSIS — F17.200 TOBACCO USE DISORDER: ICD-10-CM

## 2018-09-21 DIAGNOSIS — I21.3 ST ELEVATION MYOCARDIAL INFARCTION (STEMI), UNSPECIFIED ARTERY (H): ICD-10-CM

## 2018-09-21 LAB
ALBUMIN SERPL-MCNC: 4.3 G/DL (ref 3.4–5)
ALP SERPL-CCNC: 52 U/L (ref 40–150)
ALT SERPL W P-5'-P-CCNC: 32 U/L (ref 0–70)
ANION GAP SERPL CALCULATED.3IONS-SCNC: 7 MMOL/L (ref 3–14)
APTT PPP: 32 SEC (ref 22–37)
AST SERPL W P-5'-P-CCNC: 26 U/L (ref 0–45)
BASOPHILS # BLD AUTO: 0 10E9/L (ref 0–0.2)
BASOPHILS NFR BLD AUTO: 0.2 %
BILIRUB DIRECT SERPL-MCNC: 0.3 MG/DL (ref 0–0.2)
BILIRUB SERPL-MCNC: 0.9 MG/DL (ref 0.2–1.3)
BUN SERPL-MCNC: 18 MG/DL (ref 7–30)
CALCIUM SERPL-MCNC: 9.1 MG/DL (ref 8.5–10.1)
CHLORIDE SERPL-SCNC: 104 MMOL/L (ref 94–109)
CHOLEST SERPL-MCNC: 152 MG/DL
CO2 SERPL-SCNC: 27 MMOL/L (ref 20–32)
CREAT SERPL-MCNC: 0.74 MG/DL (ref 0.66–1.25)
DIFFERENTIAL METHOD BLD: ABNORMAL
EOSINOPHIL # BLD AUTO: 0.1 10E9/L (ref 0–0.7)
EOSINOPHIL NFR BLD AUTO: 0.4 %
ERYTHROCYTE [DISTWIDTH] IN BLOOD BY AUTOMATED COUNT: 13.2 % (ref 10–15)
GFR SERPL CREATININE-BSD FRML MDRD: >90 ML/MIN/1.7M2
GLUCOSE BLDC GLUCOMTR-MCNC: 109 MG/DL (ref 70–99)
GLUCOSE BLDC GLUCOMTR-MCNC: 131 MG/DL (ref 70–99)
GLUCOSE BLDC GLUCOMTR-MCNC: 92 MG/DL (ref 70–99)
GLUCOSE SERPL-MCNC: 162 MG/DL (ref 70–99)
HCT VFR BLD AUTO: 48 % (ref 40–53)
HDLC SERPL-MCNC: 47 MG/DL
HGB BLD-MCNC: 16.8 G/DL (ref 13.3–17.7)
IMM GRANULOCYTES # BLD: 0 10E9/L (ref 0–0.4)
IMM GRANULOCYTES NFR BLD: 0.3 %
INR PPP: 1.05 (ref 0.86–1.14)
INTERPRETATION ECG - MUSE: NORMAL
INTERPRETATION ECG - MUSE: NORMAL
KCT BLD-ACNC: 286 SEC (ref 75–150)
LDLC SERPL CALC-MCNC: 91 MG/DL
LYMPHOCYTES # BLD AUTO: 1.5 10E9/L (ref 0.8–5.3)
LYMPHOCYTES NFR BLD AUTO: 13.4 %
MCH RBC QN AUTO: 31.8 PG (ref 26.5–33)
MCHC RBC AUTO-ENTMCNC: 35 G/DL (ref 31.5–36.5)
MCV RBC AUTO: 91 FL (ref 78–100)
MONOCYTES # BLD AUTO: 0.5 10E9/L (ref 0–1.3)
MONOCYTES NFR BLD AUTO: 4.6 %
NEUTROPHILS # BLD AUTO: 9.1 10E9/L (ref 1.6–8.3)
NEUTROPHILS NFR BLD AUTO: 81.1 %
NONHDLC SERPL-MCNC: 105 MG/DL
NRBC # BLD AUTO: 0 10*3/UL
NRBC BLD AUTO-RTO: 0 /100
PLATELET # BLD AUTO: 157 10E9/L (ref 150–450)
POTASSIUM SERPL-SCNC: 3.4 MMOL/L (ref 3.4–5.3)
PROT SERPL-MCNC: 7.7 G/DL (ref 6.8–8.8)
RBC # BLD AUTO: 5.28 10E12/L (ref 4.4–5.9)
SODIUM SERPL-SCNC: 138 MMOL/L (ref 133–144)
TRIGL SERPL-MCNC: 71 MG/DL
TROPONIN I BLD-MCNC: 0.05 UG/L (ref 0–0.1)
TROPONIN I SERPL-MCNC: 0.06 UG/L (ref 0–0.04)
TROPONIN I SERPL-MCNC: 0.53 UG/L (ref 0–0.04)
TROPONIN I SERPL-MCNC: 2.14 UG/L (ref 0–0.04)
TROPONIN I SERPL-MCNC: 2.98 UG/L (ref 0–0.04)
WBC # BLD AUTO: 11.3 10E9/L (ref 4–11)

## 2018-09-21 PROCEDURE — 99285 EMERGENCY DEPT VISIT HI MDM: CPT

## 2018-09-21 PROCEDURE — 85347 COAGULATION TIME ACTIVATED: CPT

## 2018-09-21 PROCEDURE — 93010 ELECTROCARDIOGRAM REPORT: CPT | Performed by: INTERNAL MEDICINE

## 2018-09-21 PROCEDURE — C9606 PERC D-E COR REVASC W AMI S: HCPCS

## 2018-09-21 PROCEDURE — 97161 PT EVAL LOW COMPLEX 20 MIN: CPT | Mod: GP

## 2018-09-21 PROCEDURE — 27210946 ZZH KIT HC TOTES DISP CR8

## 2018-09-21 PROCEDURE — 99207 ZZC CONSULT E&M CHANGED TO INITIAL LEVEL: CPT | Performed by: PHYSICIAN ASSISTANT

## 2018-09-21 PROCEDURE — 85025 COMPLETE CBC W/AUTO DIFF WBC: CPT | Performed by: EMERGENCY MEDICINE

## 2018-09-21 PROCEDURE — 99152 MOD SED SAME PHYS/QHP 5/>YRS: CPT

## 2018-09-21 PROCEDURE — 27210856 ZZH ACCESS HEART CATH CR2

## 2018-09-21 PROCEDURE — C1874 STENT, COATED/COV W/DEL SYS: HCPCS

## 2018-09-21 PROCEDURE — 027034Z DILATION OF CORONARY ARTERY, ONE ARTERY WITH DRUG-ELUTING INTRALUMINAL DEVICE, PERCUTANEOUS APPROACH: ICD-10-PCS | Performed by: INTERNAL MEDICINE

## 2018-09-21 PROCEDURE — 71045 X-RAY EXAM CHEST 1 VIEW: CPT

## 2018-09-21 PROCEDURE — 25000132 ZZH RX MED GY IP 250 OP 250 PS 637: Performed by: INTERNAL MEDICINE

## 2018-09-21 PROCEDURE — 85610 PROTHROMBIN TIME: CPT | Performed by: EMERGENCY MEDICINE

## 2018-09-21 PROCEDURE — 93005 ELECTROCARDIOGRAM TRACING: CPT

## 2018-09-21 PROCEDURE — 27210787 ZZH MANIFOLD CR2

## 2018-09-21 PROCEDURE — 80048 BASIC METABOLIC PNL TOTAL CA: CPT | Performed by: EMERGENCY MEDICINE

## 2018-09-21 PROCEDURE — 99152 MOD SED SAME PHYS/QHP 5/>YRS: CPT | Mod: GC | Performed by: INTERNAL MEDICINE

## 2018-09-21 PROCEDURE — 99291 CRITICAL CARE FIRST HOUR: CPT | Mod: 25 | Performed by: INTERNAL MEDICINE

## 2018-09-21 PROCEDURE — 25000132 ZZH RX MED GY IP 250 OP 250 PS 637: Performed by: EMERGENCY MEDICINE

## 2018-09-21 PROCEDURE — 40000193 ZZH STATISTIC PT WARD VISIT

## 2018-09-21 PROCEDURE — 80076 HEPATIC FUNCTION PANEL: CPT | Performed by: EMERGENCY MEDICINE

## 2018-09-21 PROCEDURE — 27210914 ZZH SHEATH CR8

## 2018-09-21 PROCEDURE — C1887 CATHETER, GUIDING: HCPCS

## 2018-09-21 PROCEDURE — 27210795 ZZH PAD DEFIB QUICK CR4

## 2018-09-21 PROCEDURE — B2151ZZ FLUOROSCOPY OF LEFT HEART USING LOW OSMOLAR CONTRAST: ICD-10-PCS | Performed by: INTERNAL MEDICINE

## 2018-09-21 PROCEDURE — 96375 TX/PRO/DX INJ NEW DRUG ADDON: CPT

## 2018-09-21 PROCEDURE — 27210892 ZZH CATH CR4

## 2018-09-21 PROCEDURE — 93306 TTE W/DOPPLER COMPLETE: CPT | Mod: 26 | Performed by: INTERNAL MEDICINE

## 2018-09-21 PROCEDURE — 93458 L HRT ARTERY/VENTRICLE ANGIO: CPT | Mod: XU

## 2018-09-21 PROCEDURE — 27211089 ZZH KIT ACIST INJECTOR CR3

## 2018-09-21 PROCEDURE — C1769 GUIDE WIRE: HCPCS

## 2018-09-21 PROCEDURE — 96374 THER/PROPH/DIAG INJ IV PUSH: CPT

## 2018-09-21 PROCEDURE — 92941 PRQ TRLML REVSC TOT OCCL AMI: CPT | Mod: RC | Performed by: INTERNAL MEDICINE

## 2018-09-21 PROCEDURE — 25500064 ZZH RX 255 OP 636: Performed by: INTERNAL MEDICINE

## 2018-09-21 PROCEDURE — 99222 1ST HOSP IP/OBS MODERATE 55: CPT | Performed by: PHYSICIAN ASSISTANT

## 2018-09-21 PROCEDURE — 25000125 ZZHC RX 250: Performed by: INTERNAL MEDICINE

## 2018-09-21 PROCEDURE — 27210759 ZZH DEVICE INFLATION CR6

## 2018-09-21 PROCEDURE — 25000132 ZZH RX MED GY IP 250 OP 250 PS 637: Performed by: PHYSICIAN ASSISTANT

## 2018-09-21 PROCEDURE — B2111ZZ FLUOROSCOPY OF MULTIPLE CORONARY ARTERIES USING LOW OSMOLAR CONTRAST: ICD-10-PCS | Performed by: INTERNAL MEDICINE

## 2018-09-21 PROCEDURE — 84484 ASSAY OF TROPONIN QUANT: CPT | Mod: 91 | Performed by: EMERGENCY MEDICINE

## 2018-09-21 PROCEDURE — 25000132 ZZH RX MED GY IP 250 OP 250 PS 637: Performed by: NURSE PRACTITIONER

## 2018-09-21 PROCEDURE — 80061 LIPID PANEL: CPT | Performed by: INTERNAL MEDICINE

## 2018-09-21 PROCEDURE — 4A023N7 MEASUREMENT OF CARDIAC SAMPLING AND PRESSURE, LEFT HEART, PERCUTANEOUS APPROACH: ICD-10-PCS | Performed by: INTERNAL MEDICINE

## 2018-09-21 PROCEDURE — 21000000 ZZH R&B IMCU HEART CARE

## 2018-09-21 PROCEDURE — 84484 ASSAY OF TROPONIN QUANT: CPT | Performed by: INTERNAL MEDICINE

## 2018-09-21 PROCEDURE — 85730 THROMBOPLASTIN TIME PARTIAL: CPT | Performed by: EMERGENCY MEDICINE

## 2018-09-21 PROCEDURE — 00000146 ZZHCL STATISTIC GLUCOSE BY METER IP

## 2018-09-21 PROCEDURE — 93458 L HRT ARTERY/VENTRICLE ANGIO: CPT | Mod: 26 | Performed by: INTERNAL MEDICINE

## 2018-09-21 PROCEDURE — 84484 ASSAY OF TROPONIN QUANT: CPT

## 2018-09-21 PROCEDURE — 36415 COLL VENOUS BLD VENIPUNCTURE: CPT | Performed by: INTERNAL MEDICINE

## 2018-09-21 PROCEDURE — 99232 SBSQ HOSP IP/OBS MODERATE 35: CPT | Mod: 25 | Performed by: INTERNAL MEDICINE

## 2018-09-21 PROCEDURE — 99153 MOD SED SAME PHYS/QHP EA: CPT

## 2018-09-21 PROCEDURE — 97110 THERAPEUTIC EXERCISES: CPT | Mod: GP

## 2018-09-21 PROCEDURE — 25000128 H RX IP 250 OP 636: Performed by: EMERGENCY MEDICINE

## 2018-09-21 PROCEDURE — C1725 CATH, TRANSLUMIN NON-LASER: HCPCS

## 2018-09-21 PROCEDURE — 25000128 H RX IP 250 OP 636: Performed by: INTERNAL MEDICINE

## 2018-09-21 RX ORDER — SODIUM NITROPRUSSIDE 25 MG/ML
100-200 INJECTION INTRAVENOUS
Status: DISCONTINUED | OUTPATIENT
Start: 2018-09-21 | End: 2018-09-21 | Stop reason: HOSPADM

## 2018-09-21 RX ORDER — LIDOCAINE HYDROCHLORIDE 10 MG/ML
1-10 INJECTION, SOLUTION EPIDURAL; INFILTRATION; INTRACAUDAL; PERINEURAL
Status: DISCONTINUED | OUTPATIENT
Start: 2018-09-21 | End: 2018-09-21 | Stop reason: HOSPADM

## 2018-09-21 RX ORDER — HYDROCODONE BITARTRATE AND ACETAMINOPHEN 5; 325 MG/1; MG/1
1-2 TABLET ORAL EVERY 4 HOURS PRN
Status: DISCONTINUED | OUTPATIENT
Start: 2018-09-21 | End: 2018-09-22 | Stop reason: HOSPADM

## 2018-09-21 RX ORDER — DOBUTAMINE HYDROCHLORIDE 200 MG/100ML
2-20 INJECTION INTRAVENOUS CONTINUOUS PRN
Status: DISCONTINUED | OUTPATIENT
Start: 2018-09-21 | End: 2018-09-21 | Stop reason: HOSPADM

## 2018-09-21 RX ORDER — ENALAPRILAT 1.25 MG/ML
1.25-2.5 INJECTION INTRAVENOUS
Status: DISCONTINUED | OUTPATIENT
Start: 2018-09-21 | End: 2018-09-21 | Stop reason: HOSPADM

## 2018-09-21 RX ORDER — MORPHINE SULFATE 2 MG/ML
1-2 INJECTION, SOLUTION INTRAMUSCULAR; INTRAVENOUS EVERY 5 MIN PRN
Status: DISCONTINUED | OUTPATIENT
Start: 2018-09-21 | End: 2018-09-21 | Stop reason: HOSPADM

## 2018-09-21 RX ORDER — NITROGLYCERIN 20 MG/100ML
.07-2 INJECTION INTRAVENOUS CONTINUOUS
Status: DISCONTINUED | OUTPATIENT
Start: 2018-09-21 | End: 2018-09-21 | Stop reason: HOSPADM

## 2018-09-21 RX ORDER — HEPARIN SODIUM 1000 [USP'U]/ML
1000-10000 INJECTION, SOLUTION INTRAVENOUS; SUBCUTANEOUS EVERY 5 MIN PRN
Status: DISCONTINUED | OUTPATIENT
Start: 2018-09-21 | End: 2018-09-21 | Stop reason: HOSPADM

## 2018-09-21 RX ORDER — ASPIRIN 81 MG/1
324 TABLET, CHEWABLE ORAL ONCE
Status: COMPLETED | OUTPATIENT
Start: 2018-09-21 | End: 2018-09-21

## 2018-09-21 RX ORDER — BUPIVACAINE HYDROCHLORIDE 2.5 MG/ML
1-10 INJECTION, SOLUTION EPIDURAL; INFILTRATION; INTRACAUDAL
Status: DISCONTINUED | OUTPATIENT
Start: 2018-09-21 | End: 2018-09-21 | Stop reason: HOSPADM

## 2018-09-21 RX ORDER — LISINOPRIL 2.5 MG/1
2.5 TABLET ORAL DAILY
Status: DISCONTINUED | OUTPATIENT
Start: 2018-09-21 | End: 2018-09-22

## 2018-09-21 RX ORDER — POTASSIUM CHLORIDE 7.45 MG/ML
10 INJECTION INTRAVENOUS
Status: DISCONTINUED | OUTPATIENT
Start: 2018-09-21 | End: 2018-09-21 | Stop reason: HOSPADM

## 2018-09-21 RX ORDER — METOPROLOL SUCCINATE 25 MG/1
25 TABLET, EXTENDED RELEASE ORAL DAILY
Status: DISCONTINUED | OUTPATIENT
Start: 2018-09-21 | End: 2018-09-22 | Stop reason: HOSPADM

## 2018-09-21 RX ORDER — NALOXONE HYDROCHLORIDE 0.4 MG/ML
.2-.4 INJECTION, SOLUTION INTRAMUSCULAR; INTRAVENOUS; SUBCUTANEOUS
Status: DISCONTINUED | OUTPATIENT
Start: 2018-09-21 | End: 2018-09-21

## 2018-09-21 RX ORDER — METHYLPREDNISOLONE SODIUM SUCCINATE 125 MG/2ML
125 INJECTION, POWDER, LYOPHILIZED, FOR SOLUTION INTRAMUSCULAR; INTRAVENOUS
Status: DISCONTINUED | OUTPATIENT
Start: 2018-09-21 | End: 2018-09-21 | Stop reason: HOSPADM

## 2018-09-21 RX ORDER — FENTANYL CITRATE 50 UG/ML
75 INJECTION, SOLUTION INTRAMUSCULAR; INTRAVENOUS ONCE
Status: COMPLETED | OUTPATIENT
Start: 2018-09-21 | End: 2018-09-21

## 2018-09-21 RX ORDER — ASPIRIN 81 MG/1
81-324 TABLET, CHEWABLE ORAL
Status: DISCONTINUED | OUTPATIENT
Start: 2018-09-21 | End: 2018-09-21 | Stop reason: HOSPADM

## 2018-09-21 RX ORDER — METOPROLOL TARTRATE 1 MG/ML
5 INJECTION, SOLUTION INTRAVENOUS EVERY 5 MIN PRN
Status: DISCONTINUED | OUTPATIENT
Start: 2018-09-21 | End: 2018-09-21 | Stop reason: HOSPADM

## 2018-09-21 RX ORDER — ASPIRIN 81 MG/1
81 TABLET ORAL DAILY
Status: DISCONTINUED | OUTPATIENT
Start: 2018-09-21 | End: 2018-09-22 | Stop reason: HOSPADM

## 2018-09-21 RX ORDER — FENTANYL CITRATE 50 UG/ML
25-50 INJECTION, SOLUTION INTRAMUSCULAR; INTRAVENOUS
Status: ACTIVE | OUTPATIENT
Start: 2018-09-21 | End: 2018-09-21

## 2018-09-21 RX ORDER — PROTAMINE SULFATE 10 MG/ML
25-100 INJECTION, SOLUTION INTRAVENOUS EVERY 5 MIN PRN
Status: DISCONTINUED | OUTPATIENT
Start: 2018-09-21 | End: 2018-09-21 | Stop reason: HOSPADM

## 2018-09-21 RX ORDER — DIPHENHYDRAMINE HYDROCHLORIDE 50 MG/ML
25-50 INJECTION INTRAMUSCULAR; INTRAVENOUS
Status: DISCONTINUED | OUTPATIENT
Start: 2018-09-21 | End: 2018-09-21 | Stop reason: HOSPADM

## 2018-09-21 RX ORDER — MULTIVIT-MIN/IRON/FOLIC ACID/K 18-600-40
2000 CAPSULE ORAL DAILY
COMMUNITY

## 2018-09-21 RX ORDER — VERAPAMIL HYDROCHLORIDE 2.5 MG/ML
1-2.5 INJECTION, SOLUTION INTRAVENOUS
Status: COMPLETED | OUTPATIENT
Start: 2018-09-21 | End: 2018-09-21

## 2018-09-21 RX ORDER — ADENOSINE 3 MG/ML
12-12000 INJECTION, SOLUTION INTRAVENOUS
Status: DISCONTINUED | OUTPATIENT
Start: 2018-09-21 | End: 2018-09-21 | Stop reason: HOSPADM

## 2018-09-21 RX ORDER — EPINEPHRINE 1 MG/ML
0.3 INJECTION, SOLUTION, CONCENTRATE INTRAVENOUS
Status: DISCONTINUED | OUTPATIENT
Start: 2018-09-21 | End: 2018-09-21 | Stop reason: HOSPADM

## 2018-09-21 RX ORDER — NALOXONE HYDROCHLORIDE 0.4 MG/ML
0.4 INJECTION, SOLUTION INTRAMUSCULAR; INTRAVENOUS; SUBCUTANEOUS EVERY 5 MIN PRN
Status: DISCONTINUED | OUTPATIENT
Start: 2018-09-21 | End: 2018-09-21 | Stop reason: HOSPADM

## 2018-09-21 RX ORDER — LORAZEPAM 2 MG/ML
.5-2 INJECTION INTRAMUSCULAR EVERY 4 HOURS PRN
Status: DISCONTINUED | OUTPATIENT
Start: 2018-09-21 | End: 2018-09-21 | Stop reason: HOSPADM

## 2018-09-21 RX ORDER — NITROGLYCERIN 0.4 MG/1
0.4 TABLET SUBLINGUAL EVERY 5 MIN PRN
Status: DISCONTINUED | OUTPATIENT
Start: 2018-09-21 | End: 2018-09-21 | Stop reason: HOSPADM

## 2018-09-21 RX ORDER — DEXTROSE MONOHYDRATE 25 G/50ML
25-50 INJECTION, SOLUTION INTRAVENOUS
Status: DISCONTINUED | OUTPATIENT
Start: 2018-09-21 | End: 2018-09-22 | Stop reason: HOSPADM

## 2018-09-21 RX ORDER — ATROPINE SULFATE 0.1 MG/ML
0.5 INJECTION INTRAVENOUS EVERY 5 MIN PRN
Status: ACTIVE | OUTPATIENT
Start: 2018-09-21 | End: 2018-09-21

## 2018-09-21 RX ORDER — NITROGLYCERIN 5 MG/ML
100-500 VIAL (ML) INTRAVENOUS
Status: COMPLETED | OUTPATIENT
Start: 2018-09-21 | End: 2018-09-21

## 2018-09-21 RX ORDER — NITROGLYCERIN 5 MG/ML
100-200 VIAL (ML) INTRAVENOUS
Status: DISCONTINUED | OUTPATIENT
Start: 2018-09-21 | End: 2018-09-21 | Stop reason: HOSPADM

## 2018-09-21 RX ORDER — SODIUM CHLORIDE 9 MG/ML
INJECTION, SOLUTION INTRAVENOUS CONTINUOUS
Status: ACTIVE | OUTPATIENT
Start: 2018-09-21 | End: 2018-09-21

## 2018-09-21 RX ORDER — ACETAMINOPHEN 500 MG
1500 TABLET ORAL DAILY
COMMUNITY

## 2018-09-21 RX ORDER — PRASUGREL 10 MG/1
10-60 TABLET, FILM COATED ORAL
Status: DISCONTINUED | OUTPATIENT
Start: 2018-09-21 | End: 2018-09-21 | Stop reason: HOSPADM

## 2018-09-21 RX ORDER — PHENYLEPHRINE HCL IN 0.9% NACL 1 MG/10 ML
20-100 SYRINGE (ML) INTRAVENOUS
Status: DISCONTINUED | OUTPATIENT
Start: 2018-09-21 | End: 2018-09-21 | Stop reason: HOSPADM

## 2018-09-21 RX ORDER — DOPAMINE HYDROCHLORIDE 160 MG/100ML
2-20 INJECTION, SOLUTION INTRAVENOUS CONTINUOUS PRN
Status: DISCONTINUED | OUTPATIENT
Start: 2018-09-21 | End: 2018-09-21 | Stop reason: HOSPADM

## 2018-09-21 RX ORDER — CLOPIDOGREL BISULFATE 75 MG/1
75 TABLET ORAL
Status: DISCONTINUED | OUTPATIENT
Start: 2018-09-21 | End: 2018-09-21 | Stop reason: HOSPADM

## 2018-09-21 RX ORDER — CLOPIDOGREL 300 MG/1
300-600 TABLET, FILM COATED ORAL
Status: DISCONTINUED | OUTPATIENT
Start: 2018-09-21 | End: 2018-09-21 | Stop reason: HOSPADM

## 2018-09-21 RX ORDER — FUROSEMIDE 10 MG/ML
20-100 INJECTION INTRAMUSCULAR; INTRAVENOUS
Status: DISCONTINUED | OUTPATIENT
Start: 2018-09-21 | End: 2018-09-21 | Stop reason: HOSPADM

## 2018-09-21 RX ORDER — ACETAMINOPHEN 325 MG/1
325-650 TABLET ORAL EVERY 4 HOURS PRN
Status: DISCONTINUED | OUTPATIENT
Start: 2018-09-21 | End: 2018-09-22 | Stop reason: HOSPADM

## 2018-09-21 RX ORDER — ATORVASTATIN CALCIUM 40 MG/1
40 TABLET, FILM COATED ORAL DAILY
Status: DISCONTINUED | OUTPATIENT
Start: 2018-09-21 | End: 2018-09-22 | Stop reason: HOSPADM

## 2018-09-21 RX ORDER — NITROGLYCERIN 20 MG/100ML
INJECTION INTRAVENOUS
Status: DISCONTINUED
Start: 2018-09-21 | End: 2018-09-21 | Stop reason: HOSPADM

## 2018-09-21 RX ORDER — NIFEDIPINE 10 MG/1
10 CAPSULE ORAL
Status: DISCONTINUED | OUTPATIENT
Start: 2018-09-21 | End: 2018-09-21 | Stop reason: HOSPADM

## 2018-09-21 RX ORDER — NALOXONE HYDROCHLORIDE 0.4 MG/ML
.1-.4 INJECTION, SOLUTION INTRAMUSCULAR; INTRAVENOUS; SUBCUTANEOUS
Status: DISCONTINUED | OUTPATIENT
Start: 2018-09-21 | End: 2018-09-22 | Stop reason: HOSPADM

## 2018-09-21 RX ORDER — POTASSIUM CHLORIDE 29.8 MG/ML
20 INJECTION INTRAVENOUS
Status: DISCONTINUED | OUTPATIENT
Start: 2018-09-21 | End: 2018-09-21 | Stop reason: HOSPADM

## 2018-09-21 RX ORDER — NITROGLYCERIN 0.4 MG/1
0.4 TABLET SUBLINGUAL EVERY 5 MIN PRN
Status: DISCONTINUED | OUTPATIENT
Start: 2018-09-21 | End: 2018-09-22 | Stop reason: HOSPADM

## 2018-09-21 RX ORDER — ASPIRIN 325 MG
325 TABLET ORAL
Status: DISCONTINUED | OUTPATIENT
Start: 2018-09-21 | End: 2018-09-21 | Stop reason: HOSPADM

## 2018-09-21 RX ORDER — NICOTINE POLACRILEX 4 MG
15-30 LOZENGE BUCCAL
Status: DISCONTINUED | OUTPATIENT
Start: 2018-09-21 | End: 2018-09-22 | Stop reason: HOSPADM

## 2018-09-21 RX ORDER — PROTAMINE SULFATE 10 MG/ML
1-5 INJECTION, SOLUTION INTRAVENOUS
Status: DISCONTINUED | OUTPATIENT
Start: 2018-09-21 | End: 2018-09-21 | Stop reason: HOSPADM

## 2018-09-21 RX ORDER — FLUMAZENIL 0.1 MG/ML
0.2 INJECTION, SOLUTION INTRAVENOUS
Status: ACTIVE | OUTPATIENT
Start: 2018-09-21 | End: 2018-09-21

## 2018-09-21 RX ORDER — LORAZEPAM 0.5 MG/1
0.5 TABLET ORAL EVERY 4 HOURS PRN
Status: DISCONTINUED | OUTPATIENT
Start: 2018-09-21 | End: 2018-09-22 | Stop reason: HOSPADM

## 2018-09-21 RX ORDER — DEXTROSE MONOHYDRATE 25 G/50ML
12.5-5 INJECTION, SOLUTION INTRAVENOUS EVERY 30 MIN PRN
Status: DISCONTINUED | OUTPATIENT
Start: 2018-09-21 | End: 2018-09-21 | Stop reason: HOSPADM

## 2018-09-21 RX ORDER — FLUMAZENIL 0.1 MG/ML
0.2 INJECTION, SOLUTION INTRAVENOUS
Status: DISCONTINUED | OUTPATIENT
Start: 2018-09-21 | End: 2018-09-21 | Stop reason: HOSPADM

## 2018-09-21 RX ORDER — ONDANSETRON 2 MG/ML
4 INJECTION INTRAMUSCULAR; INTRAVENOUS EVERY 4 HOURS PRN
Status: DISCONTINUED | OUTPATIENT
Start: 2018-09-21 | End: 2018-09-21 | Stop reason: HOSPADM

## 2018-09-21 RX ORDER — HYDRALAZINE HYDROCHLORIDE 20 MG/ML
10-20 INJECTION INTRAMUSCULAR; INTRAVENOUS
Status: DISCONTINUED | OUTPATIENT
Start: 2018-09-21 | End: 2018-09-21 | Stop reason: HOSPADM

## 2018-09-21 RX ORDER — LIDOCAINE HYDROCHLORIDE 10 MG/ML
30 INJECTION, SOLUTION EPIDURAL; INFILTRATION; INTRACAUDAL; PERINEURAL
Status: DISCONTINUED | OUTPATIENT
Start: 2018-09-21 | End: 2018-09-21 | Stop reason: HOSPADM

## 2018-09-21 RX ORDER — NITROGLYCERIN 20 MG/100ML
.07-2 INJECTION INTRAVENOUS CONTINUOUS PRN
Status: DISCONTINUED | OUTPATIENT
Start: 2018-09-21 | End: 2018-09-21 | Stop reason: HOSPADM

## 2018-09-21 RX ORDER — NICARDIPINE HYDROCHLORIDE 2.5 MG/ML
100 INJECTION INTRAVENOUS
Status: DISCONTINUED | OUTPATIENT
Start: 2018-09-21 | End: 2018-09-21 | Stop reason: HOSPADM

## 2018-09-21 RX ORDER — FENTANYL CITRATE 50 UG/ML
25-50 INJECTION, SOLUTION INTRAMUSCULAR; INTRAVENOUS
Status: DISCONTINUED | OUTPATIENT
Start: 2018-09-21 | End: 2018-09-21 | Stop reason: HOSPADM

## 2018-09-21 RX ORDER — ATROPINE SULFATE 0.1 MG/ML
.5-1 INJECTION INTRAVENOUS
Status: DISCONTINUED | OUTPATIENT
Start: 2018-09-21 | End: 2018-09-21 | Stop reason: HOSPADM

## 2018-09-21 RX ADMIN — MIDAZOLAM 1 MG: 1 INJECTION INTRAMUSCULAR; INTRAVENOUS at 02:45

## 2018-09-21 RX ADMIN — LORAZEPAM 0.5 MG: 0.5 TABLET ORAL at 04:09

## 2018-09-21 RX ADMIN — TICAGRELOR 90 MG: 90 TABLET ORAL at 12:32

## 2018-09-21 RX ADMIN — LISINOPRIL 2.5 MG: 2.5 TABLET ORAL at 09:14

## 2018-09-21 RX ADMIN — VERAPAMIL HYDROCHLORIDE 2.5 MG: 2.5 INJECTION, SOLUTION INTRAVENOUS at 02:19

## 2018-09-21 RX ADMIN — NICOTINE 1 PATCH: 7 PATCH, EXTENDED RELEASE TRANSDERMAL at 04:08

## 2018-09-21 RX ADMIN — NITROGLYCERIN 0.4 MG: 0.4 TABLET SUBLINGUAL at 01:30

## 2018-09-21 RX ADMIN — METOPROLOL SUCCINATE 25 MG: 25 TABLET, EXTENDED RELEASE ORAL at 20:08

## 2018-09-21 RX ADMIN — HUMAN ALBUMIN MICROSPHERES AND PERFLUTREN 3 ML: 10; .22 INJECTION, SOLUTION INTRAVENOUS at 10:43

## 2018-09-21 RX ADMIN — Medication 1 MG: at 21:19

## 2018-09-21 RX ADMIN — TICAGRELOR 180 MG: 90 TABLET ORAL at 01:37

## 2018-09-21 RX ADMIN — FENTANYL CITRATE 50 MCG: 50 INJECTION, SOLUTION INTRAMUSCULAR; INTRAVENOUS at 02:45

## 2018-09-21 RX ADMIN — HEPARIN SODIUM 6000 UNITS: 1000 INJECTION, SOLUTION INTRAVENOUS; SUBCUTANEOUS at 01:37

## 2018-09-21 RX ADMIN — ASPIRIN 81 MG 324 MG: 81 TABLET ORAL at 01:37

## 2018-09-21 RX ADMIN — ASPIRIN 81 MG: 81 TABLET, COATED ORAL at 09:14

## 2018-09-21 RX ADMIN — NITROGLYCERIN 200 MCG: 5 INJECTION, SOLUTION INTRAVENOUS at 02:18

## 2018-09-21 RX ADMIN — NITROGLYCERIN 0.07 MCG/KG/MIN: 20 INJECTION INTRAVENOUS at 01:42

## 2018-09-21 RX ADMIN — NITROGLYCERIN 0.4 MG: 0.4 TABLET SUBLINGUAL at 01:40

## 2018-09-21 RX ADMIN — MIDAZOLAM 1 MG: 1 INJECTION INTRAMUSCULAR; INTRAVENOUS at 02:17

## 2018-09-21 RX ADMIN — SODIUM CHLORIDE: 9 INJECTION, SOLUTION INTRAVENOUS at 04:09

## 2018-09-21 RX ADMIN — Medication 135 ML: at 03:15

## 2018-09-21 RX ADMIN — FENTANYL CITRATE 75 MCG: 50 INJECTION, SOLUTION INTRAMUSCULAR; INTRAVENOUS at 01:37

## 2018-09-21 RX ADMIN — ATORVASTATIN CALCIUM 40 MG: 40 TABLET, FILM COATED ORAL at 09:14

## 2018-09-21 RX ADMIN — NICOTINE 1 PATCH: 7 PATCH, EXTENDED RELEASE TRANSDERMAL at 09:13

## 2018-09-21 RX ADMIN — Medication 12.5 MG: at 09:13

## 2018-09-21 RX ADMIN — HEPARIN SODIUM 5000 UNITS: 1000 INJECTION, SOLUTION INTRAVENOUS; SUBCUTANEOUS at 02:19

## 2018-09-21 RX ADMIN — FENTANYL CITRATE 50 MCG: 50 INJECTION, SOLUTION INTRAMUSCULAR; INTRAVENOUS at 02:17

## 2018-09-21 ASSESSMENT — ENCOUNTER SYMPTOMS
CHEST TIGHTNESS: 1
NUMBNESS: 1
SHORTNESS OF BREATH: 1
LIGHT-HEADEDNESS: 1

## 2018-09-21 NOTE — CONSULTS
Standard post-angio consult received for heart healthy diet education  Pt had angio earlier this morning  Will plan to see pt for diet review prior to dc

## 2018-09-21 NOTE — PROVIDER NOTIFICATION
MD Notification    Notified Person: MD    Notified Person Name:  Susanne    Notification Date/Time:  0330 9/21/2018    Notification Interaction:  Telephone    Purpose of Notification:  Patient highly anxious and requesting anti-anxiety med and a Nicotine patch    Orders Received:  Placed orders for Nicotine patch and Ativan for Dr. Skinner    Comments:

## 2018-09-21 NOTE — IP AVS SNAPSHOT
[facility]    [address]    [place] [address]    Phone:  [phone]                                       After Visit Summary   9/21/2018    Rk Longoria    MRN: [id number]           After Visit Summary Signature Page     I have received my discharge instructions, and my questions have been answered. I have discussed any challenges I see with this plan with the nurse or doctor.    ..........................................................................................................................................  Patient/Patient Representative Signature      ..........................................................................................................................................  Patient Representative Print Name and Relationship to Patient    ..................................................               ................................................  Date                                   Time    ..........................................................................................................................................  Reviewed by Signature/Title    ...................................................              ..............................................  Date                                               Time          22EPIC Rev 08/18

## 2018-09-21 NOTE — ED NOTES
Pt transferred to [facility] cath lab. Report called to cathlab RN, Pt on nitroglycerin gtt on transfer.

## 2018-09-21 NOTE — ED PROVIDER NOTES
History     Chief Complaint:  Chest Pain    HPI   Rk Longoria is a 63 year old male with a history of diabetes and hypertension who presents to the emergency department today with chest pain. The patient was at a club about 2 hours ago when he had sudden onset chest pressure, chest pain, tingling and numbness in the left arm, light-headedness, and shortness of breath. The pain is constant and radiates to the left arm. The tightness has subsided somewhat but he still has some pain. There have been no alleviating or aggravating factors to his pain. He denies history of drug use other than marijuana. Patient took two 81 mg aspirin before arriving in the ED.     Cardiac/PE/DVT Risk Factors:  History of hypertension - positive   History of hyperlipidemia - negative   History of diabetes - positive   History of smoking - positive   Personal history of PE/DVT - negative   Family history of PE/DVT - negative   Family history of heart complications - positive     Allergies:  No Known Drug Allergies     Medications:    Tylenol  Proair   Asprin  Glucose   Calcium carbonate  Prinivil  Glucophage   Viagra   Flomax     Past Medical History:    Anxiety  Benign prostatic hyperplasia with weak urinary symptoms   Type 2 diabetes   Alcohol abuse  Chronic hepatitis C   Vitamin D deficiency   Hyperlipidemia   Depression  Tobacco use disorder     Past Surgical History:    Appendectomy  Orthopedic surgery     Family History:    Breast cancer  Cancer  Myocardial infarction     Social History:  The patient was accompanied to the ED by grandson.  Smoking Status: Current every day  Smokeless Tobacco: Never  Alcohol Use: No    Marital Status:      Review of Systems   Respiratory: Positive for chest tightness and shortness of breath.    Cardiovascular: Positive for chest pain.   Neurological: Positive for light-headedness and numbness (left arm).   All other systems reviewed and are negative.    Physical Exam     Patient Vitals  "for the past 24 hrs:   Height Weight   09/21/18 0100 1.727 m (5' 8\") 88.5 kg (195 lb)      Physical Exam    General:   Pleasant, age appropriate, mildly ill appearing male.  HEENT:    Oropharynx is moist  Eyes:    Conjunctiva normal  Neck:    Supple, no meningismus.     CV:     Regular rate and rhythm.      No murmurs, rubs or gallops.       No unilateral leg swelling.       2+ radial pulses bilateral.       No lower extremity edema.  PULM:    Clear to auscultation bilateral.       No respiratory distress.      Good air exchange.     No rales or wheezing.  ABD:    Soft, non-tender, non-distended.       No pulsatile masses.       No rebound, guarding or rigidity.  MSK:     No gross deformity to all four extremities.   LYMPH:   No cervical lymphadenopathy.  NEURO:   Alert. Good muscle tone, no atrophy.  Skin:    Warm, dry and intact.    Psych:    Mood is good and affect is appropriate.      Emergency Department Course     ECG:  Indication: Chest pain   Completed at 0117.  Read at 0121.   Normal sinus rhythm  Left axis deviation   Abnormal ECG   STEMI   ST elevation in II, III, and V3-6   Rate 87 bpm. ND interval 164. QRS duration 96. QT/QTc 394/474. P-R-T axes 61 -41 38.     Imaging:  Radiology findings were communicated with the patient and family who voiced understanding of the findings.  Chest XR  IMPRESSION: Normal. No concerns for aortic aneurysm. Brilinta will be given.   Report per radiology      Laboratory:  Laboratory findings were communicated with the patient and family who voiced understanding of the findings.  CBC: WNL (WBC 11.3(H), HGB 16.8, )  INR: 1.05   PTT: 32   BMP: 162(H) Glucose o/w WNL (Creatinine 0.74)   Troponin (Collected 0122): 0.05  Troponin (Collected 0124): 0.062 (H)  Lab Results   Component Value Date    AST 26 09/21/2018     Lab Results   Component Value Date    ALT 32 09/21/2018     Lab Results   Component Value Date    BILICONJ 0.0 08/26/2006      Lab Results   Component Value " Date    BILITOTAL 0.9 2018     Lab Results   Component Value Date    ALBUMIN 4.3 2018     Lab Results   Component Value Date    PROTTOTAL 7.7 2018      Lab Results   Component Value Date    ALKPHOS 52 2018        Interventions:  0137: Aspirin 324mg PO   0137: Brilinta 180mg PO   0137: Heparin 6,000 units IV   0137: Sublimaze 75mcg IV     Emergency Department Course:  Nursing notes and vitals reviewed.  0115: I performed an exam of the patient as documented above.   0124: I spoke with Dr. Koenig of the Radiology service regarding patient's presentation, findings, and plan of care.   IV was inserted and blood was drawn for laboratory testing, results above.   The patient was sent for a Chest XR while in the emergency department, results above.   0121: Findings and plan explained to the Patient. Patient will be transferred to [facility] via EMS. Discussed the case with Dr. Koenig, who will admit the patient to a monitored bed for further monitoring, evaluation, and treatment.   I personally reviewed the laboratory and imaging results with the Patient and answered all related questions prior to transfer.   Impression & Plan    CMS Diagnoses: The patient has a STEMI     The patient was evaluated at 0115   Patient was transferred  to the cath lab which was activated due to ECG changes.   ASA given in the ER    Medical Decision Makin-year-old male presented to the ED with chest pain and shortness of breath concerning for acute coronary syndrome.  EKG reveals ST elevation in the inferior and lateral leads consistent with ST elevation MI.  He has no STEMI mimics noted on evaluation such as aortic dissection, aortic aneurysm or pericarditis/myocarditis.  Patient was given aspirin, Brilinta and heparin.  He was initiated on nitroglycerin drip.  His pain was improving in the ED.  Patient will be transferred to [facility] as there is no cath lab availability at [facility] at this time.  Patient is stable  for transfer.  Interventional cardiologist consulted and agreeable with transfer.    Critical Care time:  was 30 minutes for this patient excluding procedures.    Diagnosis:    ICD-10-CM    1. ST elevation myocardial infarction (STEMI), unspecified artery (H) I21.3 CBC with platelets differential     INR     Partial thromboplastin time     Basic metabolic panel     Troponin I     Hepatic panel     Troponin POCT     Troponin POCT       Disposition:  Transferred to [facility] Disclosure:  Nadia SILVA, am serving as a scribe at 1:15 AM on 9/21/2018 to document services personally performed by Pineda Velazquez MD based on my observations and the provider's statements to me.    9/21/2018   [facility] EMERGENCY DEPARTMENT       Pineda Velazquez MD  09/21/18 0235

## 2018-09-21 NOTE — H&P
[facility] History and Physical    Rk Longoria MRN# [id number]   Age: 63 year old YOB: 1955     Date of Admission:  9/21/2018    Primary care provider: Darrin Mendez          Assessment and Plan:   Assessment:   STEMI      Plan:   Plan for coronary angiography with percutaneous coronary revascularization.             Chief Complaint:     63 year old gentleman with a history of DM, HTN, smoking and alcohol abuse history presenting with acute onset of chest pain. Reportedly patient was at a club when he developed chest pain and shortness of breath with radiation to his left arm, as the pain had not improved, he presented to the ED at [facility] and was found to have ST elevations on his EKG. At that time his pain had dramatically improved. He did take two aspirin tabs prior to presenting. He also admits to smoking marijuana earlier in the day, but denies any other drugs including alcohol or illicit.           Past Medical History:     Past Medical History:   Diagnosis Date     Anxiety      ETOH abuse              Past Surgical History:      Past Surgical History:   Procedure Laterality Date     APPENDECTOMY       ORTHOPEDIC SURGERY               Social History:     Social History   Substance Use Topics     Smoking status: Current Every Day Smoker     Packs/day: 1.25     Smokeless tobacco: Never Used     Alcohol use No             Family History:     Family History   Problem Relation Age of Onset     Breast Cancer Mother      Cancer Father      Cancer Brother      brain tumor/anerysm     Myocardial Infarction Brother      Family history reviewed          Immunizations:     Immunization History   Administered Date(s) Administered     Influenza (IIV3) PF 10/28/2014, 11/17/2016     Influenza Vaccine IM 3yrs+ 4 Valent IIV4 10/30/2017     TDAP Vaccine (Adacel) 01/08/2014             Allergies:   No Known Allergies          Medications:     Prescriptions Prior to Admission   Medication Sig  Dispense Refill Last Dose     acetaminophen (TYLENOL) 500 MG tablet Take 500-1,000 mg by mouth every 6 hours as needed   Taking     albuterol (PROAIR HFA/PROVENTIL HFA/VENTOLIN HFA) 108 (90 BASE) MCG/ACT Inhaler Inhale 2 puffs into the lungs every 6 hours as needed for shortness of breath / dyspnea or wheezing 1 Inhaler 0 Not Taking     aspirin 325 MG tablet Take by mouth daily   Taking     blood glucose (NO BRAND SPECIFIED) lancets standard Use to test blood sugar 1 times daily or as directed. 100 each 11 Taking     blood glucose monitoring (NO BRAND SPECIFIED) meter device kit Use to test blood sugar 1 times daily or as directed. 1 kit 0 Taking     blood glucose monitoring (NO BRAND SPECIFIED) test strip Use to test blood sugars 1 times daily or as directed 100 strip 0 Taking     calcium carbonate-vitamin D 600-400 MG-UNIT CHEW Take by mouth 2 times daily   Taking     lisinopril (PRINIVIL/ZESTRIL) 2.5 MG tablet TAKE ONE TABLET BY MOUTH EVERY DAY 90 tablet 1      metFORMIN (GLUCOPHAGE) 500 MG tablet Take 1 tablet (500 mg) by mouth 2 times daily (with meals) 360 tablet 1 9/20/2018 at Unknown time     sildenafil (VIAGRA) 100 MG tablet Take 0.5-1 tablets ( mg) by mouth daily as needed 12 tablet 1      tamsulosin (FLOMAX) 0.4 MG capsule Take 1 capsule (0.4 mg) by mouth daily 30 capsule 1 9/20/2018 at Unknown time             Review of Systems:   The Review of Systems is negative other than noted in the HPI    There were no vitals taken for this visit.     gen nad    heent perrl, edentulous   neck no jvd   cv rr no mrg   resp ctab   gi +bs, soft, nt/nd   ext 2+dp, no edema, warm   neuro grossly normal  -          Data:     Results for orders placed or performed during the hospital encounter of 09/21/18 (from the past 24 hour(s))   Activated clotting time POCT   Result Value Ref Range    Activated Clot Time 286 (H) 75 - 150 sec   Heart Cath Left heart cath    Narrative    PROCEDURES PERFORMED:   Coronary  Angiography  Left Heart Catheterization  Percutaneous Coronary Intervention    PHYSICIANS:  Attending Physician: Norberto Koenig  Interventional Cardiology Fellow: Tae Martinez    INDICATION:  Rk Longoria is a 63 year old gentleman with a history of smoking,  DM, HTN, presenting with chest pain and ST elevations on the EKG  concerning for STEMI     DESCRIPTION:  1. Consent obtained with discussion of risks.  All questions were  answered.  2. Sterile prep and procedure.  3. Location with Sheaths:   Right radial, 6 Fr  4. Access: Local anesthetic with lidocaine.  A micropuncture 21 guage  needle with ultrasound guidance was used to establish vascular access  using a modified Seldinger technique.  5. Diagnostic Catheters:   5 Fr  Tiger, 6 Fr Pigtail  6. Guiding Catheters:  6 Fr  JR 4 GC  6. Estimated blood loss: < 25 ml    MEDICATIONS:  The procedure was performed under conscious sedation for 45 minutes  from 0215 to 0300.  The patient was assessed immediately before the first sedation  medication was administered.  Midazolam 2 mg and Fentanyl 100 mcg were  administered.  Heart rate, BP, respiration, oxygen saturation and patient responses  were monitored throughout the procedure with the assistance of the RN  under my supervision.  >> IV UFH . U was administered to achieve anticoagulation.  >> Antiplatelet Therapy:  mg or Ticagrelor 180 mg     Procedures:    CORONARY ANGIOGRAM:   1. Both coronary arteries arise from their respective cusps.  2. Dominance: Right  3. The LM is without angiographic evidence of disease.   4. LAD: Type 1 [does not supply the left ventricular apex].. The LAD  gives rise to septal perforators, D1 and D2.  The proximal LAD has  mild diffuse disease 30%, mid LAD is tortuous and has diffuse 50-60%  disease, distal LAD has minimal 20% disease.  5. LCX gives rise to three OM vessels.  The proximal LCx has a  significant 80% calcified lesion at the bifurcation with a first OM  branch  that has ostial 80% disease, otherwise with only luminal  irregularities. The mid LCx has another lesion 80-90% after a large  second OM. The second OM has proximal mild 30% stenosis. Third OM is  small and tortuous and there is mild proximal disease.  6. RCA gives rise to PL branches and supplies PDA. The proximal RCA  has a ruptured plaque with thrombotic near 95% stenosis, mid RCA has a  moderate 50-60 disease, distal RCA has mild 40% disease. The PDA has  multiple sequential lesions 60% in severity. Multiple small PL  branches.    LEFT HEART CATHETERIZATION:  1. LVEDP 6 mmHg.  2. Left ventriculography showed normal anterior, apical and inferior  wall motion, LVEF >65% and no evidence of mitral regurgitation.  3. No gradient across the aortic valve on pull back.      PERCUTANEOUS CORONARY INTERVENTION:   Lesion #1:  Proximal RCA    A 6 Fr JR 4 guide was positioned in the ostial RCA. A BMW wire was  advanced into the distal PDA. A 2.5x12mm Emerge balloon was used to  pre dilate the proximal lesion at nominal pressure. A 3.5x38mm Synergy  was then delivered across the proximal and mid RCA lesions. A 4.0x15  mm NC Emerge was then used to pre dilate the distal, mid and proximal  portion of the stent at nominal and high pressure. Distal stent was  post dilated at 18 dameon for 30 seconds, mid at 16 dameon for 30 seconds,  and proximal at 20 dameon for 30 seconds.  Final angiography showed no  evidence of perforation or dissection with residual stenosis of 0% and  LISBETH 3 flow.  No complications.    Sheath Removal:  The right radial sheath was manually removed in the cardiac  catheterization laboratory.    Contrast: Isovue, 135 ml     Fluoroscopy Time: 13.2 min    COMPLICATIONS:  1. None    SUMMARY:   Two vessel CAD with ruptured plaque in the proximal RCA as culprit in  STEMI  Normal left sided filling pressure.  Normal EF  No significant valvular disease.    PLAN:   >> ASA 81 mg qd and Ticagrelor 90 mg BID   >> BB, ACEI/ARB,  and high intensity statin per primary team.  >> Echocardiogram, troponin x8 until peak  >> Plan for staged intervention of the LCx  >> Bedrest per protocol.  >> Continued medical management and lifestyle modification for  cardiovascular risk factor optimization.   >>. Admit to inpatient    The attending interventional cardiologist was present and supervised  all critical aspects the procedure.    Findings discussed with primary team.    Tae Martinez   Cardiology Fellow    Norberto Koenig   Cardiology Staff      All cardiac studies reviewed by me.   All imaging studies reviewed by me.     Tae Martinez MD

## 2018-09-21 NOTE — PROGRESS NOTES
[facility]  Cardiology Progress Note  Date of Service: 09/21/2018     Assessment & Plan    Rk Longoria is a 63 year old male with past medical history significant for HTN, DM, alcohol abuse, and tobacco abuse. Admitted on 9/21/2018 with chest pain associated with SOB and left arm pain and found to have inferior STEMI with PAMELA to pRCA.     Assessment:  1.  Inferior STEMI  - trop 2.140 not down trending yet   - angio: PAMELA to pRCA for thrombotic ulceration, residual 60% mLAD, circumflex and OM1 bifurcation lesion with severe disease, and 80% mid to distal LCx.   - echo: 55-60%, mod to severe LVH, elevated LV filling pressures, apical dyskinesis, mildly dilated ascending aorta   - angina resolved  - LDL 91  - statin, ASA, Brilinta, lisinopril, lopressor     2. HTN  - -140s   - lisinopril, lopressor     3. Tobacco Abuse  - smoking cessation education       Plan:   1. In and outpatient cardiac rehab   2. Elective PCI for residual disease as outpatient, after recovered.  3. Continue checking troponin until down trending (next ordered for 1600)  4. Continue DAPT, uninterrupted, for at least 1 year    5. Hospitalist consult for diabetic medication management   6. Change lopressor to metoprolol XL 25 mg daily for HTN   7. Repeat EKG in the AM given continued ST elevation on telemetry     Follow-up: Andressa Muro  3:44 PM 9/21/2018   [facility]  Pager: [phone]     Interval History   Pt denies CP and angina. Doing well     Physical Exam   Temp: 98.4  F (36.9  C) Temp src: Axillary BP: 142/81 Pulse: 77 Heart Rate: 67 Resp: 16 SpO2: 100 % O2 Device: None (Room air)    Vitals:    09/21/18 0400   Weight: 86.2 kg (190 lb)       Constitutional   alert and oriented, in no acute distress.  Skin   warm and dry to touch  ENT   no pallor or cyanosis  Neck  No JVP  Chest   no tenderness to palpation   Lungs Bilateral coarse lung sounds with decreased bases.  No  wheezes  Cardiac RRR, S1 normal, S2 normal, No S3 or S4, no murmurs, no rubs  Abdomen   abdomen soft  Extremities and Back   no clubbing, cyanosis. no edema.  2+ radial pulse bilaterally, RRA without bleeding, hematoma, or bruit  Neurological   no gross motor deficits noted, affect appropriate, oriented to time, person and place.    Medications     Percutaneous Coronary Intervention orders placed (this is information for BPA alerting)         aspirin  81 mg Oral Daily     atorvastatin  40 mg Oral Daily     [START ON 9/22/2018] influenza vaccine adult (product based on age)  0.5 mL Intramuscular Prior to discharge     lisinopril  2.5 mg Oral Daily     metoprolol tartrate  12.5 mg Oral BID     nicotine  1 patch Transdermal Daily     nicotine   Transdermal Q8H     [START ON 9/22/2018] nicotine   Transdermal Daily     ticagrelor  90 mg Oral Q12H       Data   Most Recent 3 CBC's:  Recent Labs   Lab Test  09/21/18   0124  11/03/17   1413   WBC  11.3*  16.8*   HGB  16.8  17.3   MCV  91  89   PLT  157  143*     Most Recent 3 BMP's:  Recent Labs   Lab Test  09/21/18   0124  11/03/17   1413 02/15/14   NA  138  136  135   POTASSIUM  3.4  4.4  3.8   CHLORIDE  104  102  102   CO2  27  26   --    BUN  18  14  17   CR  0.74  0.66  0.74   ANIONGAP  7  8  3   HANNAH  9.1  9.2  7.5   GLC  162*  328*  170*     Most Recent 2 LFT's:  Recent Labs   Lab Test  09/21/18   0124  11/03/17   1413   AST  26  22   ALT  32  29   ALKPHOS  52  81   BILITOTAL  0.9  1.0     Most Recent 3 Troponin's:  Recent Labs   Lab Test  09/21/18   1413  09/21/18   0550  09/21/18   0124  09/21/18   0122   TROPI  2.140*  0.529*  0.062*   --    TROPONIN   --    --    --   0.05     Most Recent 3 BNP's:No lab results found.  Most Recent Cholesterol Panel:  Recent Labs   Lab Test  09/21/18   0550   CHOL  152   LDL  91   HDL  47   TRIG  71     Last 24 hours labs reviewed     Tele:

## 2018-09-21 NOTE — ED TRIAGE NOTES
Chest pain and pressure started 2 hours ago, numbness to L arm. dizzness when walking. Pt is diabetic and hasnt checked j=his blood sugar since yesterday

## 2018-09-21 NOTE — IP AVS SNAPSHOT
MRN:[id number]                      After Visit Summary   9/21/2018    Rk Longoria    MRN: [id number]           Thank you!     Thank you for choosing [facility] for your care. Our goal is always to provide you with excellent care. Hearing back from our patients is one way we can continue to improve our services. Please take a few minutes to complete the written survey that you may receive in the mail after you visit with us. Thank you!        Patient Information     Date Of Birth          1955        Designated Caregiver       Most Recent Value    Caregiver    Will someone help with your care after discharge? yes    Name of designated caregiver Lea Longoria    Phone number of caregiver See Note [[phone]]    Caregiver address See Note [[address] [place], [place]]      About your hospital stay     You were admitted on:  September 21, 2018 [facility]    You were discharged on:  September 22, 2018        Reason for your hospital stay       You were admitted to the hospital secondary to STEMI                  Who to Call     For medical emergencies, please call 911.  For non-urgent questions about your medical care, please call your primary care provider or clinic, [phone]          Attending Provider     Provider Specialty    Norberto Koenig MD Cardiology       Primary Care Provider Office Phone # Fax #    Darrin Mendez [phone]      After Care Instructions     Activity       Your activity upon discharge: activity as tolerated and no driving for today            Diet       Follow this diet upon discharge: Orders Placed This Encounter      Advance Diet as Tolerated: Regular Diet Adult                    Follow-up Appointments     Follow-up and recommended labs and tests       Follow up with primary care provider, Darrin Mendez, within 7 days to evaluate treatment change and  for hospital follow- up.  No follow up labs or test are needed.                  Your next 10 appointments already scheduled     Sep 26, 2018 10:00 AM CDT   [facility]in [facility]endez MD   [facility]    [address], [address]  [place] [id number] [phone]           Bring a current list of meds and any records pertaining to this visit. For Physicals, please bring immunization records and any forms needing to be filled out. Please arrive 10 minutes early to complete paperwork.            Oct 04, 2018 10:15 AM CDT   Cardiac Evaluation with [facility]   [facility] ([facility])    [address], [address]  [place] [address] [phone]              Additional Services     CARDIAC REHAB REFERRAL       Patient may choose their preference of the site for Cardiac Rehab.            Follow-Up with Cardiac Advanced Practice Provider       1-2 weeks                  Further instructions from your care team       [facility] will contact you this week to help you schedule an appointment with a Nurse Practitioner/Physicians Assistant.     [facility]  [address]  [place] [address]  Phone: [phone]        Cardiac Angiogram Discharge Instructions - Radial    After you go home:      Have an adult stay with you until tomorrow.    Drink extra fluids for 2 days.    You may resume your normal diet.    No smoking       For 24 hours - due to the sedation you received:    Relax and take it easy.    Do NOT make any important or legal decisions.    Do NOT drive or operate machines at home or at work.    Do NOT drink alcohol.    Care of Wrist Puncture Site:      For the first 24 hrs - check the puncture site every 1-2 hours while awake.    It is normal to have soreness at the puncture site and mild tingling in your hand  for up to 3 days.    Remove the bandaid after 24 hours. If there is minor oozing, apply another bandaid and remove it after 12 hours.    You may shower tomorrow.  Do NOT take a bath, or use a hot tub or pool for at least 3 days. Do NOT scrub the site. Do not use lotion or powder near the puncture site.           Activity:        For 2 days:     do not use your hand or arm to support your weight (such as rising from a chair)     do not bend your wrist (such as lifting a garage door).    do not lift more than 5 pounds or exercise your arm (such as tennis, golf or bowling).    Do NOT do any heavy activity such as exercise, lifting, or straining.     Bleeding:      If you start bleeding from the site in your wrist, sit down and press firmly on/above the site for 10 minutes.     Once bleeding stops, keep arm still for 2 hours.     Call [facility] as soon as you can.       Call 911 right away if you have heavy bleeding or bleeding that does not stop.      Medicines:      If you are taking an antiplatelet medication such as Plavix, Brilinta or Effient, do not stop taking it until you talk to your cardiologist.        If you are on Metformin (Glucophage), do not restart it until you have blood tests (within 2 to 3 days after discharge).  After you have your blood drawn, you may restart the Metformin.     Take your medications, including blood thinners, unless your provider tells you not to.  If you take Coumadin (Warfarin), have your INR checked by your provider in  3-5 days. Call your clinic to schedule this.    If you have stopped any medicines, check with your provider about when to restart them.    Follow Up Appointments:      Follow up with [facility] Nurse Practitioner at [facility] of patient preference in 7-10 days.    Call the clinic if:      You have a large or growing hard lump around the site.    The site is red, swollen, hot or tender.    Blood or fluid is draining from the site.    You have chills or a  "fever greater than 101 F (38 C).    Your arm feels numb, cool or changes color.    You have hives, a rash or unusual itching.    Any questions or concerns.          [facility] [place]:    [phone] UMP (7 days a week)      Pending Results     Date and Time Order Name Status Description    2018 0600 EKG 12-lead, tracing only Preliminary     2018 0319 EKG 12-lead, tracing only  Preliminary     2018 0104 EKG 12 lead Preliminary             Statement of Approval     Ordered          18 1085  I have reviewed and agree with all the recommendations and orders detailed in this document.  EFFECTIVE NOW     Associated Diagnoses:  ST elevation myocardial infarction involving right coronary artery (H) [I21.11]    Approved and electronically signed by:  Govind Villaseñor MD             Admission Information     Date & Time Provider Department Dept. Phone    2018 Norberto Koenig MD [facility] [phone]      Your Vitals Were     Blood Pressure Pulse Temperature Respirations Weight Pulse Oximetry    151/84 77 98.6  F (37  C) (Oral) 16 86.2 kg (190 lb) 99%    BMI (Body Mass Index)                   28.89 kg/m2           MyChart Information     [facility] lets you send messages to your doctor, view your test results, renew your prescriptions, schedule appointments and more. To sign up, go to [email or web address] . Click on \"Log in\" on the left side of the screen, which will take you to the Welcome page. Then click on \"Sign up Now\" on the right side of the page.     You will be asked to enter the access code listed below, as well as some personal information. Please follow the directions to create your username and password.     Your access code is: [email or web address]  Expires: 2018 11:04 AM     Your access code will  in 90 days. If you need help or a new code, please call your [place] clinic or [phone].        Care " EveryWhere ID     This is your Care EveryWhere ID. This could be used by other organizations to access your [place] medical records  [id number]        Equal Access to Services     SHANTAL JOSUE : Suzie Granados, faraz coronado, papaluis felipe fosspiaalberto garciaalliealberto, waxsarah nadiain hayaacésar villateena ramirez rachael packer. So [facility] [phone].    ATENCIÓN: Si habla español, tiene a shipley disposición servicios gratuitos de asistencia lingüística. Llame al [phone].    We comply with applicable federal civil rights laws and [place] laws. We do not discriminate on the basis of race, color, national origin, age, disability, sex, sexual orientation, or gender identity.               Review of your medicines      START taking        Dose / Directions    atorvastatin 40 MG tablet   Commonly known as:  LIPITOR   Used for:  ST elevation myocardial infarction involving left anterior descending (LAD) coronary artery (H)        Dose:  40 mg   Start taking on:  9/23/2018   Take 1 tablet (40 mg) by mouth daily   Quantity:  90 tablet   Refills:  3       chlorthalidone 25 MG tablet   Commonly known as:  HYGROTON   Used for:  ST elevation myocardial infarction involving left anterior descending (LAD) coronary artery (H)        Dose:  25 mg   Start taking on:  9/23/2018   Take 1 tablet (25 mg) by mouth daily   Quantity:  90 tablet   Refills:  3       metoprolol succinate 25 MG 24 hr tablet   Commonly known as:  TOPROL-XL   Used for:  ST elevation myocardial infarction involving left anterior descending (LAD) coronary artery (H), Tobacco use disorder        Dose:  25 mg   Take 1 tablet (25 mg) by mouth daily   Quantity:  90 tablet   Refills:  3       nicotine 7 MG/24HR 24 hr patch   Commonly known as:  NICODERM CQ   Used for:  ST elevation myocardial infarction involving left anterior descending (LAD) coronary artery (H), Tobacco use disorder        Dose:  1 patch   Start taking on:  9/23/2018   Place 1 patch onto the skin daily    Quantity:  30 patch   Refills:  3       nitroGLYcerin 0.4 MG/SPRAY spray   Commonly known as:  NITROLINGUAL        For chest pain spray 1 spray under tongue every 5 minutes for 3 doses. If symptoms persist 5 minutes after 1st dose call 911.   Quantity:  4.9 g   Refills:  1       ticagrelor 90 MG tablet   Commonly known as:  BRILINTA   Used for:  ST elevation myocardial infarction involving left anterior descending (LAD) coronary artery (H)        Dose:  90 mg   Take 1 tablet (90 mg) by mouth 2 times daily   Quantity:  90 tablet   Refills:  3         CONTINUE these medicines which may have CHANGED, or have new prescriptions. If we are uncertain of the size of tablets/capsules you have at home, strength may be listed as something that might have changed.        Dose / Directions    * aspirin 81 MG tablet   This may have changed:  Another medication with the same name was added. Make sure you understand how and when to take each.        Dose:  81 mg   Take 81 mg by mouth daily   Refills:  0       * aspirin 81 MG EC tablet   This may have changed:  You were already taking a medication with the same name, and this prescription was added. Make sure you understand how and when to take each.        Dose:  81 mg   Take 1 tablet (81 mg) by mouth daily   Quantity:  90 tablet   Refills:  3       lisinopril 10 MG tablet   Commonly known as:  PRINIVIL/ZESTRIL   This may have changed:    - medication strength  - See the new instructions.   Used for:  ST elevation myocardial infarction involving left anterior descending (LAD) coronary artery (H)        Dose:  10 mg   Start taking on:  9/23/2018   Take 1 tablet (10 mg) by mouth daily   Quantity:  90 tablet   Refills:  3       * Notice:  This list has 2 medication(s) that are the same as other medications prescribed for you. Read the directions carefully, and ask your doctor or other care provider to review them with you.      CONTINUE these medicines which have NOT CHANGED         Dose / Directions    acetaminophen 500 MG tablet   Commonly known as:  TYLENOL        Dose:  1500 mg   Take 1,500 mg by mouth daily Arthritis pain   Refills:  0       blood glucose lancets standard   Commonly known as:  no brand specified   Used for:  Type 2 diabetes mellitus with hyperglycemia, without long-term current use of insulin (H)        Use to test blood sugar 1 times daily or as directed.   Quantity:  100 each   Refills:  11       blood glucose monitoring meter device kit   Commonly known as:  no brand specified   Used for:  Type 2 diabetes mellitus with hyperglycemia, without long-term current use of insulin (H)        Use to test blood sugar 1 times daily or as directed.   Quantity:  1 kit   Refills:  0       blood glucose monitoring test strip   Commonly known as:  no brand specified   Used for:  Type 2 diabetes mellitus with hyperglycemia, without long-term current use of insulin (H)        Use to test blood sugars 1 times daily or as directed   Quantity:  100 strip   Refills:  0       metFORMIN 500 MG tablet   Commonly known as:  GLUCOPHAGE   Used for:  Type 2 diabetes mellitus with hyperglycemia, without long-term current use of insulin (H)        Dose:  500 mg   Take 1 tablet (500 mg) by mouth 2 times daily (with meals)   Quantity:  360 tablet   Refills:  1       sildenafil 100 MG tablet   Commonly known as:  VIAGRA   Used for:  Corporo-venous occlusive erectile dysfunction        Dose:   mg   Take 0.5-1 tablets ( mg) by mouth daily as needed   Quantity:  12 tablet   Refills:  1       tamsulosin 0.4 MG capsule   Commonly known as:  FLOMAX   Used for:  Benign prostatic hyperplasia with weak urinary stream        Dose:  0.4 mg   Take 1 capsule (0.4 mg) by mouth daily   Quantity:  30 capsule   Refills:  1       Vitamin D (Cholecalciferol) 1000 units Tabs        Dose:  2000 Units   Take 2,000 Units by mouth daily   Refills:  0            Where to get your medicines      These medications  were sent to [id number] - [place], [place] - [address]  [address], [facility] [address]     Phone:  [phone]     aspirin 81 MG EC tablet    atorvastatin 40 MG tablet    chlorthalidone 25 MG tablet    lisinopril 10 MG tablet    metoprolol succinate 25 MG 24 hr tablet    nicotine 7 MG/24HR 24 hr patch    nitroGLYcerin 0.4 MG/SPRAY spray    ticagrelor 90 MG tablet                Protect others around you: Learn how to safely use, store and throw away your medicines at www.disposemymeds.org.             Medication List: This is a list of all your medications and when to take them. Check marks below indicate your daily home schedule. Keep this list as a reference.      Medications           Morning Afternoon Evening Bedtime As Needed    acetaminophen 500 MG tablet   Commonly known as:  TYLENOL   Take 1,500 mg by mouth daily Arthritis pain                                   * aspirin 81 MG tablet   Take 81 mg by mouth daily   Next Dose Due:  9/23 tomorrow morning                                   * aspirin 81 MG EC tablet   Take 1 tablet (81 mg) by mouth daily   Last time this was given:  81 mg on 9/22/2018  8:37 AM                                atorvastatin 40 MG tablet   Commonly known as:  LIPITOR   Take 1 tablet (40 mg) by mouth daily   Start taking on:  9/23/2018   Last time this was given:  40 mg on 9/22/2018  8:37 AM   Next Dose Due:  9/23 tomorrow morning                                     blood glucose lancets standard   Commonly known as:  no brand specified   Use to test blood sugar 1 times daily or as directed.                                blood glucose monitoring meter device kit   Commonly known as:  no brand specified   Use to test blood sugar 1 times daily or as directed.                                blood glucose monitoring test strip   Commonly known as:  no brand specified   Use to test blood sugars 1 times daily or as directed                                 chlorthalidone 25 MG tablet   Commonly known as:  HYGROTON   Take 1 tablet (25 mg) by mouth daily   Start taking on:  9/23/2018   Last time this was given:  25 mg on 9/22/2018  4:10 PM   Next Dose Due:  9/23 tomorrow morning                                   lisinopril 10 MG tablet   Commonly known as:  PRINIVIL/ZESTRIL   Take 1 tablet (10 mg) by mouth daily   Start taking on:  9/23/2018   Last time this was given:  7.5 mg on 9/22/2018  1:37 PM                                   metFORMIN 500 MG tablet   Commonly known as:  GLUCOPHAGE   Take 1 tablet (500 mg) by mouth 2 times daily (with meals)   Next Dose Due:  9/23 tomorrow morning                                      metoprolol succinate 25 MG 24 hr tablet   Commonly known as:  TOPROL-XL   Take 1 tablet (25 mg) by mouth daily   Last time this was given:  25 mg on 9/22/2018  6:11 PM   Next Dose Due:  9/23 tomorrow evening                                   nicotine 7 MG/24HR 24 hr patch   Commonly known as:  NICODERM CQ   Place 1 patch onto the skin daily   Start taking on:  9/23/2018   Last time this was given:  1 patch on 9/22/2018  8:38 AM   Next Dose Due:  9/23 tomorrow morning                                   nitroGLYcerin 0.4 MG/SPRAY spray   Commonly known as:  NITROLINGUAL   For chest pain spray 1 spray under tongue every 5 minutes for 3 doses. If symptoms persist 5 minutes after 1st dose call 911.                                   sildenafil 100 MG tablet   Commonly known as:  VIAGRA   Take 0.5-1 tablets ( mg) by mouth daily as needed                                   tamsulosin 0.4 MG capsule   Commonly known as:  FLOMAX   Take 1 capsule (0.4 mg) by mouth daily   Last time this was given:  0.4 mg on 9/22/2018  1:37 PM   Next Dose Due:  9/23 tomorrow morning                                   ticagrelor 90 MG tablet   Commonly known as:  BRILINTA   Take 1 tablet (90 mg) by mouth 2 times daily   Last time this was given:  90 mg on 9/22/2018 12:29 PM    Next Dose Due:  9/24 tonight bedtime                                      Vitamin D (Cholecalciferol) 1000 units Tabs   Take 2,000 Units by mouth daily   Next Dose Due:  9/23 tomorrow morning                                   * Notice:  This list has 2 medication(s) that are the same as other medications prescribed for you. Read the directions carefully, and ask your doctor or other care provider to review them with you.              More Information        Aspirin, ASA oral tablets  Brand Names: Aspir-Low, Aspirtab, Aspir-Patricia, Angel Advanced Aspirin, Angel Aspirin, Angel Aspirin Extra Strength, Angel Aspirin Plus, Angel Extra Strength, Angel Extra Strength Plus, Angel Genuine Aspirin, Angel Womens Aspirin, Bufferin, Bufferin Extra Strength, Bufferin Low Dose  What is this medicine?  ASPIRIN (AS pir in) is a pain reliever. It is used to treat mild pain and fever. This medicine is also used as directed by a doctor to prevent and to treat heart attacks, to prevent strokes, and to treat arthritis or inflammation.  How should I use this medicine?  Take this medicine by mouth with a glass of water. Follow the directions on the package or prescription label. You can take this medicine with or without food. If it upsets your stomach, take it with food. Do not take your medicine more often than directed.  Talk to your pediatrician regarding the use of this medicine in children. While this drug may be prescribed for children as young as 12 years of age for selected conditions, precautions do apply. Children and teenagers should not use this medicine to treat chicken pox or flu symptoms unless directed by a doctor.  Patients over 65 years old may have a stronger reaction and need a smaller dose.  What side effects may I notice from receiving this medicine?  Side effects that you should report to your doctor or health care professional as soon as possible:    allergic reactions like skin rash, itching or hives, swelling of  the face, lips, or tongue    breathing problems    changes in hearing, ringing in the ears    confusion    general ill feeling or flu-like symptoms    pain on swallowing    redness, blistering, peeling or loosening of the skin, including inside the mouth or nose    signs and symptoms of bleeding such as bloody or black, tarry stools; red or dark-brown urine; spitting up blood or brown material that looks like coffee grounds; red spots on the skin; unusual bruising or bleeding from the eye, gums, or nose    trouble passing urine or change in the amount of urine    unusually weak or tired    yellowing of the eyes or skin  Side effects that usually do not require medical attention (report to your doctor or health care professional if they continue or are bothersome):    diarrhea or constipation    headache    nausea, vomiting    stomach gas, heartburn  What may interact with this medicine?  Do not take this medicine with any of the following medications:    cidofovir    ketorolac    probenecid  This medicine may also interact with the following medications:    alcohol    alendronate    bismuth subsalicylate    flavocoxid    herbal supplements like feverfew, garlic, madonna, ginkgo biloba, horse chestnut    medicines for diabetes or glaucoma like acetazolamide, methazolamide    medicines for gout    medicines that treat or prevent blood clots like enoxaparin, heparin, ticlopidine, warfarin    other aspirin and aspirin-like medicines    NSAIDs, medicines for pain and inflammation, like ibuprofen or naproxen    pemetrexed    sulfinpyrazone    varicella live vaccine  What if I miss a dose?  If you are taking this medicine on a regular schedule and miss a dose, take it as soon as you can. If it is almost time for your next dose, take only that dose. Do not take double or extra doses.  Where should I keep my medicine?  Keep out of the reach of children.  Store at room temperature between 15 and 30 degrees C (59 and 86 degrees  F). Protect from heat and moisture. Do not use this medicine if it has a strong vinegar smell. Throw away any unused medicine after the expiration date.  What should I tell my health care provider before I take this medicine?  They need to know if you have any of these conditions:    anemia    asthma    bleeding problems    child with chickenpox, the flu, or other viral infection    diabetes    gout    if you frequently drink alcohol containing drinks    kidney disease    liver disease    low level of vitamin K    lupus    smoke tobacco    stomach ulcers or other problems    an unusual or allergic reaction to aspirin, tartrazine dye, other medicines, dyes, or preservatives    pregnant or trying to get pregnant    breast-feeding  What should I watch for while using this medicine?  If you are treating yourself for pain, tell your doctor or health care professional if the pain lasts more than 10 days, if it gets worse, or if there is a new or different kind of pain. Tell your doctor if you see redness or swelling. Also, check with your doctor if you have a fever that lasts for more than 3 days. Only take this medicine to prevent heart attacks or blood clotting if prescribed by your doctor or health care professional.  Do not take aspirin or aspirin-like medicines with this medicine. Too much aspirin can be dangerous. Always read the labels carefully.  This medicine can irritate your stomach or cause bleeding problems. Do not smoke cigarettes or drink alcohol while taking this medicine. Do not lie down for 30 minutes after taking this medicine to prevent irritation to your throat.  If you are scheduled for any medical or dental procedure, tell your healthcare provider that you are taking this medicine. You may need to stop taking this medicine before the procedure.  This medicine may be used to treat migraines. If you take migraine medicines for 10 or more days a month, your migraines may get worse. Keep a diary of  headache days and medicine use. Contact your healthcare professional if your migraine attacks occur more frequently.  NOTE:This sheet is a summary. It may not cover all possible information. If you have questions about this medicine, talk to your doctor, pharmacist, or health care provider. Copyright  2018 [employer]                Atorvastatin tablets  Brand Name: Lipitor  What is this medicine?  ATORVASTATIN (a TORE va sta tin) is known as a HMG-CoA reductase inhibitor or 'statin'. It lowers the level of cholesterol and triglycerides in the blood. This drug may also reduce the risk of heart attack, stroke, or other health problems in patients with risk factors for heart disease. Diet and lifestyle changes are often used with this drug.  How should I use this medicine?  Take this medicine by mouth with a glass of water. Follow the directions on the prescription label. You can take this medicine with or without food. Take your doses at regular intervals. Do not take your medicine more often than directed.  Talk to your pediatrician regarding the use of this medicine in children. While this drug may be prescribed for children as young as 10 years old for selected conditions, precautions do apply.  What side effects may I notice from receiving this medicine?  Side effects that you should report to your doctor or health care professional as soon as possible:    allergic reactions like skin rash, itching or hives, swelling of the face, lips, or tongue    dark urine    fever    joint pain    muscle cramps, pain    redness, blistering, peeling or loosening of the skin, including inside the mouth    trouble passing urine or change in the amount of urine    unusually weak or tired    yellowing of eyes or skin  Side effects that usually do not require medical attention (report to your doctor or health care professional if they continue or are bothersome):    constipation    heartburn    stomach gas, pain, upset  What may  interact with this medicine?  Do not take this medicine with any of the following medications:    red yeast rice    telaprevir    telithromycin    voriconazole  This medicine may also interact with the following medications:    alcohol    antiviral medicines for HIV or AIDS    boceprevir    certain antibiotics like clarithromycin, erythromycin, troleandomycin    certain medicines for cholesterol like fenofibrate or gemfibrozil    cimetidine    clarithromycin    colchicine    cyclosporine    digoxin    female hormones, like estrogens or progestins and birth control pills    grapefruit juice    medicines for fungal infections like fluconazole, itraconazole, ketoconazole    niacin    rifampin    spironolactone  What if I miss a dose?  If you miss a dose, take it as soon as you can. If it is almost time for your next dose, take only that dose. Do not take double or extra doses.  Where should I keep my medicine?  Keep out of the reach of children.  Store at room temperature between 20 to 25 degrees C (68 to 77 degrees F). Throw away any unused medicine after the expiration date.  What should I tell my health care provider before I take this medicine?  They need to know if you have any of these conditions:    frequently drink alcoholic beverages    history of stroke, TIA    kidney disease    liver disease    muscle aches or weakness    other medical condition    an unusual or allergic reaction to atorvastatin, other medicines, foods, dyes, or preservatives    pregnant or trying to get pregnant    breast-feeding  What should I watch for while using this medicine?  Visit your doctor or health care professional for regular check-ups. You may need regular tests to make sure your liver is working properly.  Tell your doctor or health care professional right away if you get any unexplained muscle pain, tenderness, or weakness, especially if you also have a fever and tiredness. Your doctor or health care professional may tell  you to stop taking this medicine if you develop muscle problems. If your muscle problems do not go away after stopping this medicine, contact your health care professional.  This drug is only part of a total heart-health program. Your doctor or a dietician can suggest a low-cholesterol and low-fat diet to help. Avoid alcohol and smoking, and keep a proper exercise schedule.  Do not use this drug if you are pregnant or breast-feeding. Serious side effects to an unborn child or to an infant are possible. Talk to your doctor or pharmacist for more information.  This medicine may affect blood sugar levels. If you have diabetes, check with your doctor or health care professional before you change your diet or the dose of your diabetic medicine.  If you are going to have surgery tell your health care professional that you are taking this drug.  NOTE:This sheet is a summary. It may not cover all possible information. If you have questions about this medicine, talk to your doctor, pharmacist, or health care provider. Copyright  2018 [employer]                Chlorthalidone tablets  Brand Name: Thalitone  What is this medicine?  CHLORTHALIDONE (klor THAL i done) is a diuretic. It increases the amount of urine passed, which causes the body to lose salt and water. This medicine is used to treat high blood pressure and edema or water retention.  How should I use this medicine?  Take this medicine by mouth with a glass of water. Follow the directions on the prescription label. It is best to take your dose in the morning with food. Take your medicine at regular intervals. Do not take your medicine more often than directed. Do not stop taking except on your doctor's advice.  Talk to your pediatrician regarding the use of this medicine in children. Special care may be needed.  What side effects may I notice from receiving this medicine?  Side effects that you should report to your doctor or health care professional as soon as  possible:    allergic reactions like skin rash, itching or hives, swelling of the face, lips, or tongue    dark urine    dry mouth    excess thirst    fast, irregular heart rate    fever, chills    muscle pain, cramps, or spasm    nausea, vomiting    redness, blistering, peeling or loosening of the skin, including inside the mouth    tingling, pain or numbness in the hands or feet    unusually weak or tired    yellowing of the eyes or skin  Side effects that usually do not require medical attention (report to your doctor or health care professional if they continue or are bothersome):    diarrhea or constipation    headache    impotence    loss of appetite    stomach upset  What may interact with this medicine?    barbiturate medicines for sleep or seizure control    digoxin    lithium    medicines for diabetes    norepinephrine    other medicines for high blood pressure    some pain medicines    steroid hormones like prednisone, cortisone, hydrocortisone, corticotropin    tubocurarine    What if I miss a dose?  If you miss a dose, take it as soon as you can. If it is almost time for your next dose, take only that dose. Do not take double or extra doses.  Where should I keep my medicine?  Keep out of the reach of children.  Store at room temperature between 15 and 30 degrees C (59 and 86 degrees F). Keep container tightly closed. Throw away any unused medicine after the expiration date.  What should I tell my health care provider before I take this medicine?  They need to know if you have any of these conditions:    asthma    diabetes    gout    kidney disease    liver disease    parathyroid disease    systemic lupus erythematosus (SLE)    taking cortisone, digoxin, lithium carbonate, or drugs for diabetes    an unusual or allergic reaction to chlorthalidone, sulfa drugs, other medicines, foods, dyes, or preservatives    pregnant or trying to get pregnant    breast-feeding  What should I watch for while using this  medicine?  Visit your doctor or health care professional for regular check ups. Check your blood pressure as directed. Ask your doctor or health care professional what your blood pressure should be and when you should contact him or her.  You may need to be on a special diet while taking this medicine. Ask your doctor.  You may get drowsy or dizzy. Do not drive, use machinery, or do anything that needs mental alertness until you know how this medicine affects you. Do not stand or sit up quickly, especially if you are an older patient. This reduces the risk of dizzy or fainting spells. Alcohol may interfere with the effect of this medicine. Avoid alcoholic drinks.  This medicine may affect your blood sugar level. If you have diabetes, check with your doctor or health care professional before changing the dose of your diabetic medicine.  This medicine can make you more sensitive to the sun. Keep out of the sun. If you cannot avoid being in the sun, wear protective clothing and use sunscreen. Do not use sun lamps or tanning beds/booths.  NOTE:This sheet is a summary. It may not cover all possible information. If you have questions about this medicine, talk to your doctor, pharmacist, or health care provider. Copyright  2018 [employer]                Lisinopril tablets  Brand Names: Prinivil, Zestril  What is this medicine?  LISINOPRIL (lyse IN oh pril) is an ACE inhibitor. This medicine is used to treat high blood pressure and heart failure. It is also used to protect the heart immediately after a heart attack.  How should I use this medicine?  Take this medicine by mouth with a glass of water. Follow the directions on your prescription label. You may take this medicine with or without food. If it upsets your stomach, take it with food. Take your medicine at regular intervals. Do not take it more often than directed. Do not stop taking except on your doctor's advice.  Talk to your pediatrician regarding the use of this  medicine in children. Special care may be needed. While this drug may be prescribed for children as young as 6 years of age for selected conditions, precautions do apply.  What side effects may I notice from receiving this medicine?  Side effects that you should report to your doctor or health care professional as soon as possible:    allergic reactions like skin rash, itching or hives, swelling of the hands, feet, face, lips, throat, or tongue    breathing problems    signs and symptoms of kidney injury like trouble passing urine or change in the amount of urine    signs and symptoms of increased potassium like muscle weakness; chest pain; or fast, irregular heartbeat    signs and symptoms of liver injury like dark yellow or brown urine; general ill feeling or flu-like symptoms; light-colored stools; loss of appetite; nausea; right upper belly pain; unusually weak or tired; yellowing of the eyes or skin    signs and symptoms of low blood pressure like dizziness; feeling faint or lightheaded, falls; unusually weak or tired    stomach pain with or without nausea and vomiting  Side effects that usually do not require medical attention (report to your doctor or health care professional if they continue or are bothersome):    changes in taste    cough    dizziness    fever    headache    sensitivity to light  What may interact with this medicine?  Do not take this medicine with any of the following medications:    hymenoptera venom    sacubitril; valsartan  This medicines may also interact with the following medications:    aliskiren    angiotensin receptor blockers, like losartan or valsartan    certain medicines for diabetes    diuretics    everolimus    gold compounds    lithium    NSAIDs, medicines for pain and inflammation, like ibuprofen or naproxen    potassium salts or supplements    salt substitutes    sirolimus    temsirolimus  What if I miss a dose?  If you miss a dose, take it as soon as you can. If it is  almost time for your next dose, take only that dose. Do not take double or extra doses.  Where should I keep my medicine?  Keep out of the reach of children.  Store at room temperature between 15 and 30 degrees C (59 and 86 degrees F). Protect from moisture. Keep container tightly closed. Throw away any unused medicine after the expiration date.  What should I tell my health care provider before I take this medicine?  They need to know if you have any of these conditions:    diabetes    heart or blood vessel disease    kidney disease    low blood pressure    previous swelling of the tongue, face, or lips with difficulty breathing, difficulty swallowing, hoarseness, or tightening of the throat    an unusual or allergic reaction to lisinopril, other ACE inhibitors, insect venom, foods, dyes, or preservatives    pregnant or trying to get pregnant    breast-feeding  What should I watch for while using this medicine?  Visit your doctor or health care professional for regular check ups. Check your blood pressure as directed. Ask your doctor what your blood pressure should be, and when you should contact him or her.  Do not treat yourself for coughs, colds, or pain while you are using this medicine without asking your doctor or health care professional for advice. Some ingredients may increase your blood pressure.  Women should inform their doctor if they wish to become pregnant or think they might be pregnant. There is a potential for serious side effects to an unborn child. Talk to your health care professional or pharmacist for more information.  Check with your doctor or health care professional if you get an attack of severe diarrhea, nausea and vomiting, or if you sweat a lot. The loss of too much body fluid can make it dangerous for you to take this medicine.  You may get drowsy or dizzy. Do not drive, use machinery, or do anything that needs mental alertness until you know how this drug affects you. Do not stand or  sit up quickly, especially if you are an older patient. This reduces the risk of dizzy or fainting spells. Alcohol can make you more drowsy and dizzy. Avoid alcoholic drinks.  Avoid salt substitutes unless you are told otherwise by your doctor or health care professional.  NOTE:This sheet is a summary. It may not cover all possible information. If you have questions about this medicine, talk to your doctor, pharmacist, or health care provider. Copyright  2018 [employer]                Metoprolol extended-release tablets  Brand Names: toprol, Toprol XL  What is this medicine?  METOPROLOL (me TOE proe lole) is a beta-blocker. Beta-blockers reduce the workload on the heart and help it to beat more regularly. This medicine is used to treat high blood pressure and to prevent chest pain. It is also used to after a heart attack and to prevent an additional heart attack from occurring.  How should I use this medicine?  Take this medicine by mouth with a glass of water. Follow the directions on the prescription label. Do not crush or chew. Take this medicine with or immediately after meals. Take your doses at regular intervals. Do not take more medicine than directed. Do not stop taking this medicine suddenly. This could lead to serious heart-related effects.  Talk to your pediatrician regarding the use of this medicine in children. While this drug may be prescribed for children as young as 6 years for selected conditions, precautions do apply.  What side effects may I notice from receiving this medicine?  Side effects that you should report to your doctor or health care professional as soon as possible:    allergic reactions like skin rash, itching or hives    cold or numb hands or feet    depression    difficulty breathing    faint    fever with sore throat    irregular heartbeat, chest pain    rapid weight gain    swollen legs or ankles  Side effects that usually do not require medical attention (report to your doctor or  health care professional if they continue or are bothersome):    anxiety or nervousness    change in sex drive or performance    dry skin    headache    nightmares or trouble sleeping    short term memory loss    stomach upset or diarrhea    unusually tired  What may interact with this medicine?  This medicine may interact with the following medications:    certain medicines for blood pressure, heart disease, irregular heart beat    certain medicines for depression, like monoamine oxidase (MAO) inhibitors, fluoxetine, or paroxetine    clonidine    dobutamine    epinephrine    isoproterenol    reserpine  What if I miss a dose?  If you miss a dose, take it as soon as you can. If it is almost time for your next dose, take only that dose. Do not take double or extra doses.  Where should I keep my medicine?  Keep out of the reach of children.  Store at room temperature between 15 and 30 degrees C (59 and 86 degrees F). Throw away any unused medicine after the expiration date.  What should I tell my health care provider before I take this medicine?  They need to know if you have any of these conditions:    diabetes    heart or vessel disease like slow heart rate, worsening heart failure, heart block, sick sinus syndrome or Raynaud's disease    kidney disease    liver disease    lung or breathing disease, like asthma or emphysema    pheochromocytoma    thyroid disease    an unusual or allergic reaction to metoprolol, other beta-blockers, medicines, foods, dyes, or preservatives    pregnant or trying to get pregnant    breast-feeding  What should I watch for while using this medicine?  Visit your doctor or health care professional for regular check ups. Contact your doctor right away if your symptoms worsen. Check your blood pressure and pulse rate regularly. Ask your health care professional what your blood pressure and pulse rate should be, and when you should contact them.  You may get drowsy or dizzy. Do not drive, use  machinery, or do anything that needs mental alertness until you know how this medicine affects you. Do not sit or stand up quickly, especially if you are an older patient. This reduces the risk of dizzy or fainting spells. Contact your doctor if these symptoms continue. Alcohol may interfere with the effect of this medicine. Avoid alcoholic drinks.  NOTE:This sheet is a summary. It may not cover all possible information. If you have questions about this medicine, talk to your doctor, pharmacist, or health care provider. Copyright  2018 [employer]                Patient Education    Nicotine Inhalation vapour, liquid    Nicotine Nasal spray, solution    Nicotine Polacrilex Chewing-gum, medicated    Nicotine Polacrilex Oral lozenge    Nicotine Transdermal patch - 16 hour    Nicotine Transdermal patch - 24 hour  Nicotine Transdermal patch - 24 hour  What is this medicine?  NICOTINE (KIMBERLY oh teen) helps people stop smoking. The patches replace the nicotine found in cigarettes and help to decrease withdrawal effects. They are most effective when used in combination with a stop-smoking program.  This medicine may be used for other purposes; ask your health care provider or pharmacist if you have questions.  What should I tell my health care provider before I take this medicine?  They need to know if you have any of these conditions:    diabetes    heart disease, angina, irregular heartbeat or previous heart attack    high blood pressure    lung disease, including asthma    overactive thyroid    pheochromocytoma    seizures or a history of seizures    skin problems, like eczema    stomach problems or ulcers    an unusual or allergic reaction to nicotine, adhesives, other medicines, foods, dyes, or preservatives    pregnant or trying to get pregnant    breast-feeding  How should I use this medicine?  This medicine is for use on the skin. Follow the directions that come with the patches. Find an area of skin on your upper arm,  chest, or back that is clean, dry, greaseless, undamaged and hairless. Wash hands with plain soap and water. Do not use anything that contains aloe, lanolin or glycerin as these may prevent the patch from sticking. Dry thoroughly. Remove the patch from the sealed pouch. Do not try to cut or trim the patch. Using your palm, press the patch firmly in place for 10 seconds to make sure that there is good contact with your skin. After applying the patch, wash your hands. Change the patch every day, keeping to a regular schedule. When you apply a new patch, use a new area of skin. Wait at least 1 week before using the same area again.  Talk to your pediatrician regarding the use of this medicine in children. Special care may be needed.  Overdosage: If you think you have taken too much of this medicine contact a poison control center or emergency room at once.  NOTE: This medicine is only for you. Do not share this medicine with others.  What if I miss a dose?  If you forget to replace a patch, use it as soon as you can. Only use one patch at a time and do not leave on the skin for longer than directed. If a patch falls off, you can replace it, but keep to your schedule and remove the patch at the right time.  What may interact with this medicine?    medicines for asthma    medicines for blood pressure    medicines for mental depression  This list may not describe all possible interactions. Give your health care provider a list of all the medicines, herbs, non-prescription drugs, or dietary supplements you use. Also tell them if you smoke, drink alcohol, or use illegal drugs. Some items may interact with your medicine.  What should I watch for while using this medicine?  You should begin using the nicotine patch the day you stop smoking. It is okay if you do not succeed at your attempt to quit and have a cigarette. You can still continue your quit attempt and keep using the product as directed. Just throw away your  cigarettes and get back to your quit plan.  You can keep the patch in place during swimming, bathing, and showering. If your patch falls off during these activities, replace it.  When you first apply the patch, your skin may itch or burn. This should go away soon. When you remove a patch, the skin may look red, but this should only last for a few days. Call your doctor or health care professional if skin redness does not go away after 4 days, if your skin swells, or if you get a rash.  If you are a diabetic and you quit smoking, the effects of insulin may be increased and you may need to reduce your insulin dose. Check with your doctor or health care professional about how you should adjust your insulin dose.  If you are going to have a magnetic resonance imaging (MRI) procedure, tell your MRI technician if you have this patch on your body. It must be removed before a MRI.  What side effects may I notice from receiving this medicine?  Side effects that you should report to your doctor or health care professional as soon as possible:    allergic reactions like skin rash, itching or hives, swelling of the face, lips, or tongue    breathing problems    changes in hearing    changes in vision    chest pain    cold sweats    confusion    fast, irregular heartbeat    feeling faint or lightheaded, falls    headache    increased saliva    skin redness that lasts more than 4 days    stomach pain    signs and symptoms of nicotine overdose like nausea; vomiting; dizziness; weakness; and rapid heartbeat  Side effects that usually do not require medical attention (report to your doctor or health care professional if they continue or are bothersome):    diarrhea    dry mouth    hiccups    irritability    nervousness or restlessness    trouble sleeping or vivid dreams  This list may not describe all possible side effects. Call your doctor for medical advice about side effects. You may report side effects to FDA at  1-800-FDA-1088.  Where should I keep my medicine?  Keep out of the reach of children.  Store at room temperature between 20 and 25 degrees C (68 and 77 degrees F). Protect from heat and light. Store in manufacturers packaging until ready to use. Throw away unused medicine after the expiration date. When you remove a patch, fold with sticky sides together; put in an empty opened pouch and throw away.  NOTE:This sheet is a summary. It may not cover all possible information. If you have questions about this medicine, talk to your doctor, pharmacist, or health care provider. Copyright  2016 [employer]                Ticagrelor oral tablet  Brand Name: BRILINTA  What is this medicine?  TICAGRELOR (ANA ka GREL or) helps to prevent blood clots. This medicine is used to prevent heart attack, stroke, or other vascular events in people who have had a recent heart attack or who have severe chest pain.  How should I use this medicine?  Take this medicine by mouth with a glass of water. Follow the directions on the prescription label. You can take it with or without food. If it upsets your stomach, take it with food. Take your medicine at regular intervals. Do not take it more often than directed. Do not stop taking except on your doctor's advice.  Talk to you pediatrician regarding the use of this medicine in children. Special care may be needed.  What side effects may I notice from receiving this medicine?  Side effects that you should report to your doctor or health care professional as soon as possible:    allergic reactions like skin rash, itching or hives, swelling of the face, lips, or tongue    breathing problems    fast or irregular heartbeat    feeling faint or light-headed, falls    signs and symptoms of bleeding such as bloody or black, tarry stools; red or dark-brown urine; spitting up blood or brown material that looks like coffee grounds; red spots on the skin; unusual bruising or bleeding from the eye, gums,  or nose  Side effects that usually do not require medical attention (report to your doctor or health care professional if they continue or are bothersome):    breast enlargement in both males and females    diarrhea    dizziness    headache    tiredness    upset stomach  What may interact with this medicine?      certain antibiotics like clarithromycin and telithromycin    certain medicines for fungal infections like itraconazole, ketoconazole, and voriconazole    certain medicines for HIV infection like atazanavir, indinavir, nelfinavir, ritonavir, and saquinavir    certain medicines for seizures like carbamazepine, phenobarbital, and phenytoin    certain medicines that treat or prevent blood clots like warfarin    dexamethasone    digoxin    lovastatin    nefazodone    rifampin    simvastatin  What if I miss a dose?  If you miss a dose, take it as soon as you can. If it is almost time for your next dose, take only that dose. Do not take double or extra doses.  Where should I keep my medicine?  Keep out of the reach of children.  Store at room temperature of 59 to 86 degrees F (15 to 30 degrees C). Throw away any unused medicine after the expiration date.  What should I tell my health care provider before I take this medicine?  They need to know if you have any of these conditions:    bleeding disorders    bleeding in the brain    having surgery    history of irregular heartbeat    history of stomach bleeding    liver disease    an unusual or allergic reaction to ticagrelor, other medicines, foods, dyes, or preservatives    pregnant or trying to get pregnant    breast-feeding  What should I watch for while using this medicine?  Visit your doctor or health care professional for regular check ups. Do not stop taking you medicine unless your doctor tells you to.  Notify your doctor or health care professional and seek emergency treatment if you develop breathing problems; changes in vision; chest pain; severe, sudden  headache; pain, swelling, warmth in the leg; trouble speaking; sudden numbness or weakness of the face, arm, or leg. These can be signs that your condition has gotten worse.  If you are going to have surgery or dental work, tell your doctor or health care professional that you are taking this medicine.  You should take aspirin every day with this medicine. Do not take more than 100 mg each day. Talk to your doctor if you have questions.  NOTE:This sheet is a summary. It may not cover all possible information. If you have questions about this medicine, talk to your doctor, pharmacist, or health care provider. Copyright  2018 [employer]                Discharge Instructions: Taking Fast-Acting Nitroglycerin  Your healthcare provider prescribed nitroglycerin for you. This medicine relieves chest pain caused by a lack of blood flow to the heart (angina) by getting more oxygen-rich blood to your heart. Fast-acting nitroglycerin can stop an angina attack. Follow the steps below for taking fast-acting nitroglycerin. Note: Your healthcare provider may give you slightly different instructions. If so, follow them carefully.  The name of my fast-acting nitroglycerin medicine is ________________________________.   To stop an angina attack   Sit down before you take your nitroglycerin. The medicine may make you feel dizzy because it lowers blood pressure rapidly.  Using fast-acting tablets    Place 1 tablet under your tongue. You can also place it between your lip and gum, or between your cheek and gum.    Let the tablet dissolve completely. Don t swallow or chew the tablet.    As the tablet is dissolving, do not eat or drink anything, or smoke or chew tobacco.  Using fast-acting spray    Open your mouth and hold the sprayer just in front of your mouth.    Press the button on the top. Spray once on or under your tongue. Do not inhale.    Close your mouth. Then wait a few seconds before you swallow.  After taking 1 tablet or  spraying once    Continue sitting for 5 minutes.    If the angina goes away completely, rest for a while and continue your normal routine.  Call 911 if your angina lasts longer than 5 minutes and 1 tablet or 1 spray has not relieved it. Don't delay. You may be having a heart attack (acute myocardial infarction, or AMI)!  After you call 911, take a second tablet. Or, spray a second time. Wait another 5 minutes. If the angina still does not go away, take a third tablet, or spray a third time. Don't take more than 3 tablets, or spray more than 3 times, within 15 minutes. Stay on the phone with 911 for further instructions.    Precautions    Limit the amount of alcohol you drink. Too much alcohol can cause dizziness or fainting.    Don't take phosphodiesterase inhibitors, such as sildenafil. These are medicines used to treat sexual dysfunction in men--at any time if you are on nitroglycerin treatment. The combination of nitroglycerin with these medicines can cause a severe drop in blood pressure. This can lead to dizziness, fainting, heart attack, or stroke.    Check the expiration date. Nitroglycerin can lose its effectiveness over time.    Tell your healthcare provider if your angina attacks last longer, occur more often, or are more severe.   Possible side effects of nitroglycerin  If you have any of these side effects, tell your healthcare provider right away. But don t stop taking the medicine until your doctor tells you to. Mild side effects include:    More gas (flatulence) than normal    Bloating    Nausea    Hair loss    Decreased appetite    Weight loss    Headache    Dizziness    Flushing (redness of the face, neck, or chest)  When to call your healthcare provider  Call your healthcare provider right away if you have any of the following:    Severe headache    Severe dizziness, or fainting    Nausea or vomiting    Fast heartbeat (higher than 100 beats per minute)    Swollen ankles    Weakness    Angina  attacks that last longer, occur more often, or are more severe than in the past or occur at rest   Date Last Reviewed: 5/1/2016 2000-2017 [employer] [address] [facility], [place] [address]. All rights reserved. This information is not intended as a substitute for professional medical care. Always follow your healthcare professional's instructions.                Tips for Quitting Smoking (Cardiovascular)  Quitting smoking is a gift to yourself, one of the best things you can do to keep your heart disease from getting worse. Smoking reduces oxygen flow to your heart by speeding the buildup of plaque and changing the health of your blood vessels. This increases your risk for heart attack, also known as acute myocardial infarction, or AMI. Quitting helps reduce smoking's harmful effects. You may have tried to quit before, but don t give up. Try again. Many smokers try 4 or 5 times before they succeed. It is never too early to benefit from smoking cessation, especially if you already have chronic conditions such as high blood pressure and high cholesterol that put you at increased risk for cardiovascular disease.     You ll have the best chance of success if you join a stop-smoking group and have the support of your doctor, family and friends.     Line up help    Ask for the support of your family and friends.    Join a smoking cessation class, or ask your healthcare provider for a referral to a psychologist who specializes in helping people quit smoking.     Ask your healthcare provider about nicotine replacement products and prescription medicines that can help you quit.  Set a quit date    Choose a date within the next 2 to 4 weeks.    After picking a day, zee it in bold letters on a calendar.     Your quit list  Ideas to stop smoking include:  1. Start by giving up cigarettes at the times you least need them.  2. Keeping a piece of fruit close by at the times you are most vulnerable to reach  for a cigarette.  3. Using a nicotine replacement product instead of a cigarette.  Write down a few more ideas.     Set limits    Limit where you can smoke. Pick one room or a porch, and smoke only in that place.    Make smoking outdoors a house rule. Other smokers won t tempt you as much.    Speak to smokers around you about your intent to stop smoking so they can show consideration for you and limit their smoking around you.    Hang a list of   quit benefits  in the spot where you smoke. Put one on the refrigerator and one on your car dashboard.     For more information    smokefree.gov/ufpb-zo-mn-expert    [employer] Smoking Quitline: 877-44U-QUIT (377-965-2196)      Date Last Reviewed: 3/26/2016    6488-8762 [employer]. [address] [place] [address], [place] [address]. All rights reserved. This information is not intended as a substitute for professional medical care. Always follow your healthcare professional's instructions.

## 2018-09-21 NOTE — PLAN OF CARE
Problem: Patient Care Overview  Goal: Plan of Care/Patient Progress Review  Discharge Planner PT   Patient plan for discharge: Home  Current status: Pt is independent with bed mobility, transfers, ambulation. Pt able to lia 5 mins on treadmill at speed 1.2, HR increase to 146, quickly recovered after rest, BP stable. Please refer to vital signs flow sheets.  Barriers to return to prior living situation: Decreased activity tolerance  Recommendations for discharge: Home with family support, outpatient cardiac rehab  Rationale for recommendations: Pt progressing well, able to mobilize, would benefit from cont education and monitoring during aerobic activity.       Entered by: Adeola Morelos 09/21/2018 2:26 PM

## 2018-09-21 NOTE — PLAN OF CARE
Problem: Patient Care Overview  Goal: Plan of Care/Patient Progress Review  PT/CR: Orders received, chart reviewed, attempted cardiac rehab evaluation, pt having echo performed at bedside. Will reschedule cardiac rehab evaluation as able.

## 2018-09-21 NOTE — PLAN OF CARE
Problem: Patient Care Overview  Goal: Plan of Care/Patient Progress Review  Outcome: Improving  Pt is intact. Denies chest pain. Pt does have some remaining left arm numbness but states its improving.  Trop Q8. Cont to monitor.   Pt worked with cardiac rehab and tolerated well.   Family at bedside and updated.

## 2018-09-21 NOTE — PROGRESS NOTES
Hospitalist admission note dictated. Confirmation #: [id number].    JoAnna Barthell, PA-C  9/21/2018   6:04 PM

## 2018-09-21 NOTE — PLAN OF CARE
"Problem: Cardiac: ACS (Acute Coronary Syndrome) (Adult)  Goal: Signs and Symptoms of Listed Potential Problems Will be Absent, Minimized or Managed (Cardiac: ACS)  Signs and symptoms of listed potential problems will be absent, minimized or managed by discharge/transition of care (reference Cardiac: ACS (Acute Coronary Syndrome) (Adult) CPG).   Outcome: Improving  Patient admitted to room 264 from Cath Lab following P/S to Proximal LAD.  TR band in right radial site with 15 ml of air.  VSS.  NSR. Patient states he feels chest pain at a \"2\", but also states he feels quite anxious.  Patient requested something for relaxing and also a Nicotine patch.      "

## 2018-09-21 NOTE — PROGRESS NOTES
09/21/18 1408   Quick Adds   Type of Visit Initial PT Evaluation   Living Environment   Lives With spouse;child(eli), adult   Living Arrangements house   Home Accessibility stairs to enter home;stairs within home   Number of Stairs to Enter Home 3   Number of Stairs Within Home 20   Stair Railings at Home inside, present on right side   Transportation Available car;family or friend will provide   Living Environment Comment Pt reports he lives with spouse and son in two story home, was independent without use of assistive device prior to admission   Self-Care   Dominant Hand right   Usual Activity Tolerance good   Current Activity Tolerance moderate   Regular Exercise yes   Activity/Exercise Type walking   Exercise Amount/Frequency 5-7 times/wk;20 mins   Equipment Currently Used at Home none   Activity/Exercise/Self-Care Comment Pt reports he walks his dog 1-1.5 miles daily   Functional Level Prior   Ambulation 0-->independent   Transferring 0-->independent   Toileting 0-->independent   Bathing 0-->independent   Dressing 0-->independent   Eating 0-->independent   Communication 0-->understands/communicates without difficulty   Swallowing 0-->swallows foods/liquids without difficulty   Cognition 0 - no cognition issues reported   Fall history within last six months no   General Information   Onset of Illness/Injury or Date of Surgery - Date 09/21/18   Referring Physician Tae Martinez MD   Patient/Family Goals Statement To go home as soon as possible   Pertinent History of Current Problem (include personal factors and/or comorbidities that impact the POC) Pt is 63 year old male adm on 9/21/18 as a transfer from [facility] for management of STEMI. Pt presented to [facility] ED with CP and SOB, +ST elevation, txfr to [facility] and emergently to cath lab, had stent placed to prox LAD via R radial access.   Precautions/Limitations no known precautions/limitations   Heart Disease Risk Factors Smoking;Diabetes;High  "blood pressure;Family history;Medical history;Gender;Age   Cognitive Status Examination   Orientation orientation to person, place and time   Level of Consciousness alert   Pain Assessment   Patient Currently in Pain No   Integumentary/Edema   Integumentary/Edema no deficits were identifed   Posture    Posture Forward head position   Range of Motion (ROM)   ROM Comment B LE ROM WFL   Strength   Strength Comments B LE strength WFL   Bed Mobility   Bed Mobility Comments Independent   Transfer Skills   Transfer Comments Independent   Gait   Gait Comments Independent   Balance   Balance Comments Good   Sensory Examination   Sensory Perception Comments Pt reports baseline neuropathy from diabetes, no changes currently   General Therapy Interventions   Planned Therapy Interventions risk factor education;home program guidelines;progressive activity/exercise   Clinical Impression   Criteria for Skilled Therapeutic Intervention yes, treatment indicated   PT Diagnosis Impaired aerobic activity tolerance   Influenced by the following impairments Decreased activity tolerance   Functional limitations due to impairments Decreased independence and safety with daily tasks   Clinical Presentation Stable/Uncomplicated   Clinical Presentation Rationale Current status, [facility]   Clinical Decision Making (Complexity) Low complexity   Therapy Frequency` 2 times/day   Predicted Duration of Therapy Intervention (days/wks) 3 days   Anticipated Discharge Disposition Home with Outpatient Therapy   Risk & Benefits of therapy have been explained Yes   Patient, Family & other staff in agreement with plan of care Yes   [facility] AM-PAC TM \"6 Clicks\"   2016, Trustees of [facility], under license to [employer].  All rights reserved.   6 Clicks Short Forms Basic Mobility Inpatient Short Form   [facility] AM-PAC  \"6 Clicks\" V.2 Basic Mobility Inpatient Short Form   1. Turning from your back to your side while in a flat bed without " using bedrails? 4 - None   2. Moving from lying on your back to sitting on the side of a flat bed without using bedrails? 4 - None   3. Moving to and from a bed to a chair (including a wheelchair)? 4 - None   4. Standing up from a chair using your arms (e.g., wheelchair, or bedside chair)? 4 - None   5. To walk in hospital room? 4 - None   6. Climbing 3-5 steps with a railing? 4 - None   Basic Mobility Raw Score (Score out of 24.Lower scores equate to lower levels of function) 24   Total Evaluation Time   Total Evaluation Time (Minutes) 10

## 2018-09-21 NOTE — PHARMACY-ADMISSION MEDICATION HISTORY
Admission medication history interview status for the 9/21/2018  admission is complete. See EPIC admission navigator for prior to admission medications     Medication history source reliability:Good    Actions taken by pharmacist (provider contacted, etc):  Reviewed Surescript and interviewed pt.     Additional medication history information not noted on PTA med list :None    Medication reconciliation/reorder completed by provider prior to medication history? No    Time spent in this activity: 10 minutes    Prior to Admission medications    Medication Sig Last Dose Taking? Auth Provider   acetaminophen (TYLENOL) 500 MG tablet Take 1,500 mg by mouth daily Arthritis pain 9/20/2018 at am Yes Unknown, Entered By History   aspirin 81 MG tablet Take 81 mg by mouth daily 9/20/2018 Yes Unknown, Entered By History   lisinopril (PRINIVIL/ZESTRIL) 2.5 MG tablet TAKE ONE TABLET BY MOUTH EVERY DAY 9/20/2018 Yes Darrin Mendez MD   metFORMIN (GLUCOPHAGE) 500 MG tablet Take 1 tablet (500 mg) by mouth 2 times daily (with meals) 9/20/2018 Yes Darrin Mendez MD   sildenafil (VIAGRA) 100 MG tablet Take 0.5-1 tablets ( mg) by mouth daily as needed new - hasn't filled yet Yes Darrin Mendez MD   tamsulosin (FLOMAX) 0.4 MG capsule Take 1 capsule (0.4 mg) by mouth daily 9/20/2018 Yes Darrin Mendez MD   Vitamin D, Cholecalciferol, 1000 units TABS Take 2,000 Units by mouth daily 9/20/2018 Yes Unknown, Entered By History   blood glucose (NO BRAND SPECIFIED) lancets standard Use to test blood sugar 1 times daily or as directed.   Darrin Mendez MD   blood glucose monitoring (NO BRAND SPECIFIED) meter device kit Use to test blood sugar 1 times daily or as directed.   Darrin Mendez MD   blood glucose monitoring (NO BRAND SPECIFIED) test strip Use to test blood sugars 1 times daily or as directed   Darrin Mendez MD

## 2018-09-21 NOTE — CONSULTS
Medication coverage check for Brilinta. $0 copay.  Sarah Beth Willingham CphT  [facility] Discharge Pharmacy Liaison  Liaison Cell: [phone]

## 2018-09-22 ENCOUNTER — APPOINTMENT (OUTPATIENT)
Dept: PHYSICAL THERAPY | Facility: CLINIC | Age: 63
End: 2018-09-22
Attending: INTERNAL MEDICINE
Payer: COMMERCIAL

## 2018-09-22 VITALS
BODY MASS INDEX: 28.89 KG/M2 | RESPIRATION RATE: 16 BRPM | TEMPERATURE: 98.6 F | WEIGHT: 190 LBS | SYSTOLIC BLOOD PRESSURE: 151 MMHG | HEART RATE: 77 BPM | OXYGEN SATURATION: 99 % | DIASTOLIC BLOOD PRESSURE: 84 MMHG

## 2018-09-22 LAB
ANION GAP SERPL CALCULATED.3IONS-SCNC: 8 MMOL/L (ref 3–14)
BUN SERPL-MCNC: 16 MG/DL (ref 7–30)
CALCIUM SERPL-MCNC: 8.5 MG/DL (ref 8.5–10.1)
CHLORIDE SERPL-SCNC: 110 MMOL/L (ref 94–109)
CO2 SERPL-SCNC: 24 MMOL/L (ref 20–32)
CREAT SERPL-MCNC: 0.62 MG/DL (ref 0.66–1.25)
ERYTHROCYTE [DISTWIDTH] IN BLOOD BY AUTOMATED COUNT: 13.3 % (ref 10–15)
GFR SERPL CREATININE-BSD FRML MDRD: >90 ML/MIN/1.7M2
GLUCOSE BLDC GLUCOMTR-MCNC: 124 MG/DL (ref 70–99)
GLUCOSE BLDC GLUCOMTR-MCNC: 127 MG/DL (ref 70–99)
GLUCOSE SERPL-MCNC: 109 MG/DL (ref 70–99)
HCT VFR BLD AUTO: 45.1 % (ref 40–53)
HGB BLD-MCNC: 16.1 G/DL (ref 13.3–17.7)
MCH RBC QN AUTO: 31.7 PG (ref 26.5–33)
MCHC RBC AUTO-ENTMCNC: 35.7 G/DL (ref 31.5–36.5)
MCV RBC AUTO: 89 FL (ref 78–100)
PLATELET # BLD AUTO: 138 10E9/L (ref 150–450)
POTASSIUM SERPL-SCNC: 3.8 MMOL/L (ref 3.4–5.3)
RBC # BLD AUTO: 5.08 10E12/L (ref 4.4–5.9)
SODIUM SERPL-SCNC: 142 MMOL/L (ref 133–144)
TROPONIN I SERPL-MCNC: 1.86 UG/L (ref 0–0.04)
WBC # BLD AUTO: 8.9 10E9/L (ref 4–11)

## 2018-09-22 PROCEDURE — 25000132 ZZH RX MED GY IP 250 OP 250 PS 637: Performed by: INTERNAL MEDICINE

## 2018-09-22 PROCEDURE — 93010 ELECTROCARDIOGRAM REPORT: CPT | Performed by: INTERNAL MEDICINE

## 2018-09-22 PROCEDURE — 84484 ASSAY OF TROPONIN QUANT: CPT | Performed by: INTERNAL MEDICINE

## 2018-09-22 PROCEDURE — 97110 THERAPEUTIC EXERCISES: CPT | Mod: GP

## 2018-09-22 PROCEDURE — 25000132 ZZH RX MED GY IP 250 OP 250 PS 637: Performed by: NURSE PRACTITIONER

## 2018-09-22 PROCEDURE — 99232 SBSQ HOSP IP/OBS MODERATE 35: CPT | Performed by: INTERNAL MEDICINE

## 2018-09-22 PROCEDURE — 00000146 ZZHCL STATISTIC GLUCOSE BY METER IP

## 2018-09-22 PROCEDURE — 40000193 ZZH STATISTIC PT WARD VISIT

## 2018-09-22 PROCEDURE — 36415 COLL VENOUS BLD VENIPUNCTURE: CPT | Performed by: INTERNAL MEDICINE

## 2018-09-22 PROCEDURE — 93005 ELECTROCARDIOGRAM TRACING: CPT

## 2018-09-22 PROCEDURE — 85027 COMPLETE CBC AUTOMATED: CPT | Performed by: INTERNAL MEDICINE

## 2018-09-22 PROCEDURE — 90682 RIV4 VACC RECOMBINANT DNA IM: CPT | Performed by: INTERNAL MEDICINE

## 2018-09-22 PROCEDURE — 80048 BASIC METABOLIC PNL TOTAL CA: CPT | Performed by: INTERNAL MEDICINE

## 2018-09-22 PROCEDURE — 25000128 H RX IP 250 OP 636: Performed by: INTERNAL MEDICINE

## 2018-09-22 PROCEDURE — 99207 ZZC CDG-CODE CATEGORY CHANGED: CPT | Performed by: INTERNAL MEDICINE

## 2018-09-22 RX ORDER — LISINOPRIL 10 MG/1
10 TABLET ORAL DAILY
Status: DISCONTINUED | OUTPATIENT
Start: 2018-09-23 | End: 2018-09-22 | Stop reason: HOSPADM

## 2018-09-22 RX ORDER — CHLORTHALIDONE 25 MG/1
25 TABLET ORAL DAILY
Status: DISCONTINUED | OUTPATIENT
Start: 2018-09-22 | End: 2018-09-22 | Stop reason: HOSPADM

## 2018-09-22 RX ORDER — METOPROLOL SUCCINATE 25 MG/1
25 TABLET, EXTENDED RELEASE ORAL DAILY
Qty: 90 TABLET | Refills: 3 | Status: SHIPPED | OUTPATIENT
Start: 2018-09-22 | End: 2018-12-10

## 2018-09-22 RX ORDER — TAMSULOSIN HYDROCHLORIDE 0.4 MG/1
0.4 CAPSULE ORAL DAILY
Status: DISCONTINUED | OUTPATIENT
Start: 2018-09-22 | End: 2018-09-22 | Stop reason: HOSPADM

## 2018-09-22 RX ORDER — CHLORTHALIDONE 25 MG/1
25 TABLET ORAL DAILY
Qty: 90 TABLET | Refills: 3 | Status: SHIPPED | OUTPATIENT
Start: 2018-09-23 | End: 2018-10-09

## 2018-09-22 RX ORDER — NITROGLYCERIN 400 UG/1
SPRAY ORAL
Qty: 4.9 G | Refills: 1 | Status: SHIPPED | OUTPATIENT
Start: 2018-09-22 | End: 2022-01-13

## 2018-09-22 RX ORDER — LISINOPRIL 10 MG/1
10 TABLET ORAL DAILY
Qty: 90 TABLET | Refills: 3 | Status: SHIPPED | OUTPATIENT
Start: 2018-09-23 | End: 2021-03-09

## 2018-09-22 RX ORDER — ATORVASTATIN CALCIUM 40 MG/1
40 TABLET, FILM COATED ORAL DAILY
Qty: 90 TABLET | Refills: 3 | Status: SHIPPED | OUTPATIENT
Start: 2018-09-23 | End: 2021-03-09

## 2018-09-22 RX ADMIN — INFLUENZA A VIRUS A/MICHIGAN/45/2015 (H1N1) RECOMBINANT HEMAGGLUTININ ANTIGEN, INFLUENZA A VIRUS A/SINGAPORE/INFIMH-16-0019/2016 (H3N2) RECOMBINANT HEMAGGLUTININ ANTIGEN, INFLUENZA B VIRUS B/MARYLAND/15/2016 RECOMBINANT HEMAGGLUTININ ANTIGEN, AND INFLUENZA B VIRUS B/PHUKET/3073/2013 RECOMBINANT HEMAGGLUTININ ANTIGEN 0.5 ML: 45; 45; 45; 45 INJECTION INTRAMUSCULAR at 08:48

## 2018-09-22 RX ADMIN — TAMSULOSIN HYDROCHLORIDE 0.4 MG: 0.4 CAPSULE ORAL at 13:37

## 2018-09-22 RX ADMIN — LISINOPRIL 2.5 MG: 2.5 TABLET ORAL at 08:37

## 2018-09-22 RX ADMIN — LISINOPRIL 7.5 MG: 5 TABLET ORAL at 13:37

## 2018-09-22 RX ADMIN — METOPROLOL SUCCINATE 25 MG: 25 TABLET, EXTENDED RELEASE ORAL at 18:11

## 2018-09-22 RX ADMIN — TICAGRELOR 90 MG: 90 TABLET ORAL at 12:29

## 2018-09-22 RX ADMIN — NICOTINE 1 PATCH: 7 PATCH, EXTENDED RELEASE TRANSDERMAL at 08:38

## 2018-09-22 RX ADMIN — TICAGRELOR 90 MG: 90 TABLET ORAL at 00:27

## 2018-09-22 RX ADMIN — ASPIRIN 81 MG: 81 TABLET, COATED ORAL at 08:37

## 2018-09-22 RX ADMIN — ATORVASTATIN CALCIUM 40 MG: 40 TABLET, FILM COATED ORAL at 08:37

## 2018-09-22 RX ADMIN — CHLORTHALIDONE 25 MG: 25 TABLET ORAL at 16:10

## 2018-09-22 NOTE — PLAN OF CARE
Problem: Patient Care Overview  Goal: Plan of Care/Patient Progress Review  Outcome: No Change  VSS, patient alert and oriented. Ambulates independently around room and nicholson. No complaints of pain, rested well through night.

## 2018-09-22 NOTE — CONSULTS
Consult Date:  2018      HOSPITALIST CONSULTATION      PRIMARY CARE PROVIDER:  Darrin Mendez MD      REQUESTING PROVIDER:  Kylie ORTEGA CNP      REASON FOR CONSULTATION:  Medical comanagement.      History is provided by the patient.      HISTORY OF PRESENT ILLNESS:  Rk Longoria is a 63-year-old male with a history of BPH, type 2 diabetes, hypertension and tobacco dependence who presented to the Emergency Department with complaints of chest pain and shortness of breath, found to have an inferior STEMI.  He was taken emergently to the Cath Lab and underwent PCI with drug-eluting stent to an ulcerated thrombotic lesion in the proximal RCA.  Angiography showed moderate mid-LAD disease, severe ostial first OM disease, borderline significant circumflex lesion and 80% lesion to the mid to distal circumflex.      Presently, the patient is evaluated at the bedside with several family members present.  He is awake and pleasantly conversant.  He denies chest pain, difficulty breathing, abdominal pain, nausea and vomiting.  He is a 1.5 to 2 pack per day smoker for at least 40 years.  He is a recovering alcoholic with abstinence for 11 years.  He endorses smoking cannabis at least twice per week.      PAST MEDICAL HISTORY:   1.  Hypertension.   2.  Type 2 diabetes.   3.  BPH.   4.  Tobacco dependence.   5.  History of alcohol use disorder.      PAST SURGICAL HISTORY:     1.  Repair of a fractured shattered femur.   2.  Appendectomy.      FAMILY HISTORY:   1.  Mother:  Breast cancer.   2.  Father: Colon cancer.   3.  Oldest brother  from MI and mid 70s.   4.  A second brother with history of MI in his early 70s, stroke, prostate cancer.      SOCIAL HISTORY:   1.  1 to  2 pack per day smoker for at least 40 years.   2.  Recovering alcoholic with abstinence x11 years.   3.  Smokes cannabis at least twice per week.      PRIOR TO ADMISSION MEDICATIONS:   1.  Acetaminophen 1500 mg daily.   2.  Aspirin  81 mg daily.   3.  Lisinopril 2.5 mg daily.   4.  Metformin 500 mg b.i.d.   5.  Sildenafil 50/100 mg daily p.r.n.   6.  Tamsulosin 0.4 mg daily.   7.  Vitamin D cholecalciferol 2000 units daily.      ALLERGIES:  NO KNOWN DRUG ALLERGIES.      PHYSICAL EXAMINATION:   VITAL SIGNS:  Blood pressure 142/81, heart rate is 77, temperature is 98.4, respirations are 16, oxygen saturation is 100%.  Weight is 86.2 kg.   GENERAL:  Pleasant male who appears stated age.  He looks comfortable lying on his left side, talking family in bed.   SKIN:  Warm, dry.  No rash or lesions on exposed skin.   HEENT:  Normocephalic, atraumatic.  EOMs grossly intact.  PERRLA.  Moist mucous membranes.   NECK:  Supple.  No cervical lymphadenopathy.   CHEST:  Breath sounds clear to auscultation.  No increased work of breathing.   CARDIOVASCULAR:  Regular rate and rhythm.  No rub, murmur appreciated.  No peripheral edema.  DP pulses detected and symmetric.   ABDOMEN:  Soft, nontender, nondistended.   MUSCULOSKELETAL:  Moves all 4 extremities.   NEUROLOGIC:  Cranial nerves II-XII grossly intact.      LABORATORY DATA:     1.  Complete metabolic panel within normal limits with exception of glucose at 162.   2.  Direct bilirubin is 0.3.   3.  Troponin trend 0.062, 0.529, 2.140.   4.  WBC 11.3, hemoglobin 16.8, platelets 157.   5.  Total cholesterol 152, HDL 47, LDL 91, triglycerides 71.      IMAGING:  Initial EKG showed ST elevation in V3, V4, V5 and V6 leads.      Echocardiogram shows EF 55% - 60% and no regional wall motion abnormalities.  Mild ascending aortic dilation.  Moderate to severe LVH.  Grade 1 diastolic dysfunction.      ASSESSMENT AND PLAN:  Rk Longoria is a pleasant 63-year-old male with history of tobacco dependence, BPH, type 2 diabetes, and hypertension who presented to the Emergency Department with chest pain and shortness of breath found to have an evolving inferolateral STEMI.  The Hospitalist Service was consulted for medical  co-management.      Coronary artery disease with acute inferolateral STEMI, status post PAMELA to proximal RCA.   Proximal RCA lesion appeared thrombotic and ulcerated.  He was also found to have a moderate LAD disease, severe first OM disease and mid to distal circumflex lesion.  Troponins have not yet started trending down.  Cardiology plans to address additional lesions after recovery from acute MI.   -- Defer cardiac management to Cardiology Service.   -- Continues on prior to admission lisinopril and aspirin 81 mg.   -- Atorvastatin 40 mg and metoprolol succinate started this admission.   -- Brilinta started this admission.      Hypertension.  Borderline to hypertensive blood pressure is 130s/160s post-stent placement.   -- Continues on prior to admission ACE inhibitor and initiated on beta blocker as above.      Type 2 diabetes.  Last A1c of  5.6 on 09/19/2018.  Hold prior to admission metformin 500 mg b.i.d.   -- Q.i.d., a.c. and at bedtime glucose monitoring available.     Tobacco dependence.  Cannabis use disorder.  1-2 pack per day smoker for at least 40 years.  Discussed nicotine replacement therapy. Smokes marijuana at least twice per week.  -- Encourage smoking cessation.      History of alcohol use disorder.  Recovering alcoholic with sobriety x 11 years.       BPH.  Continues on prior to admission Flomax.     Deep venous thrombosis prophylaxis:  PCDs.       CODE STATUS:  FULL CODE.      DISPOSITION:  Discharge per Cardiology Service.      The Hospitalist Service thanks you for this consultation.  We will continue to follow along with you.      This patient was discussed with Dr. Singh Bell of the Hospitalist Service who is in agreement with my assessment and plan of care as indicated above.         ADRIAN BELL MD       As dictated by JOANNA ULMEN BARTHELL, PA-C            D: 09/21/2018   T: 09/21/2018   MT: BORIS      Name:     JULIO HA   MRN:      [id number]        Account:        [id number]   :      1955           Consult Date:  2018      Document: [id number]       cc: Darrin Mendez MD

## 2018-09-22 NOTE — PROGRESS NOTES
[facility]    Hospitalist Progress Note :     Cumulative Summary: Rk Longoria is a 63 year old male who was admitted on 9/21/2018. His PMH is significant for BPH, type 2 diabetes mellitus , essential HTN and tobacco dependence who was admitted on 09/21 with chest pain and shortness of breath and was found to have inferior STEMI.  Patient was emergently taken to the Cath Lab and underwent PCI with drug-eluting stent to an ulcerated thrombotic lesion in the proximal RCA.  Angiography showed moderate mid LAD disease, severe ostial first OM disease, borderline significant circumflex lesion and 80% lesion to the mid to distal circumflex.    Assessment & Plan     Active Problems:  Inferior STEMI, status post PCI of the proximal RCA  Multivessel coronary artery disease  Essential hypertension  Tobacco dependence    --Continue to monitor patient with telemetry  --Cardiology managing the medication continue patient on dual antiplatelet therapy with aspirin and Brilinta for 1 year.  --Lisinopril is increased to 10 mg p.o. daily.  --Cardiology has added Chlorthalidone 25 mg p.o. daily.  --Continue metoprolol succinate 25 mg daily.  --Lipitor 40 mg daily.  --Outpatient close cardiology follow-up with plan for a staged PCI procedure.  --Cardiac rehabilitation  --Counseled regarding nicotine dependence and importance of quitting.    Type 2 diabetes mellitus: Well-controlled last hemoglobin A1c was 5.6 on September 19, 2018  --Continue to hold oral metformin 500 mg p.o. twice daily, probably can be started back tomorrow once patient is discharged with would be 48 hours after the IV contrast exposure.  --Continue patient in the meantime on sliding scale insulin.    Benign prostate hyperplasia:  -- continue patient on PTA Flomax.    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      DVT Prophylaxis: Pneumatic Compression Devices, Anti-embolisim stockings (TEDs) and Ambulate  every shift  Code Status: Full Code    Disposition: Expected discharge tomorrow once ok with cardiology     Tati Loomis MD, FACP  Text Page (7am - 6pm)      Interval History   Patient was seen and examined, feeling well, aware about results of his angiogram.  Denying any chest pain, shortness of breath or palpitations.    -Data reviewed today: I reviewed all new labs and imaging results over the last 24 hours.    I personally reviewed the EKG tracing showing Normal sinus rhythm with left anterior fascicular block..    Physical Exam   Temp: 98.2  F (36.8  C) Temp src: Oral BP: (!) 141/96 Pulse: 77 Heart Rate: 68 Resp: 16 SpO2: 96 % O2 Device: None (Room air)    Vitals:    09/21/18 0400   Weight: 86.2 kg (190 lb)     Vital Signs with Ranges  Temp:  [98.2  F (36.8  C)-98.6  F (37  C)] 98.2  F (36.8  C)  Pulse:  [77] 77  Heart Rate:  [68-87] 68  Resp:  [16] 16  BP: (141-168)/(78-96) 141/96  SpO2:  [96 %-99 %] 96 %  I/O last 3 completed shifts:  In: 980 [P.O.:680; I.V.:300]  Out: -     GENERAL: Alert , awake and oriented. NAD. Conversational, appropriate.   HEENT: Normocephalic. EOMI. No icterus or injection. Nares normal.   LUNGS: Clear to auscultation. No dyspnea at rest.   HEART: Regular rate. Extremities perfused.   ABDOMEN: Soft, nontender, and nondistended. Positive bowel sounds.   EXTREMITIES: No LE edema noted.   NEUROLOGIC: Moves extremities x4 on command. No acute focal neurologic abnormalities noted.     Medications     Percutaneous Coronary Intervention orders placed (this is information for BPA alerting)         aspirin  81 mg Oral Daily     atorvastatin  40 mg Oral Daily     insulin aspart  1-7 Units Subcutaneous TID AC     insulin aspart  1-5 Units Subcutaneous At Bedtime     [START ON 9/23/2018] lisinopril  10 mg Oral Daily     lisinopril  7.5 mg Oral Once     metoprolol succinate  25 mg Oral Daily     nicotine  1 patch Transdermal Daily     nicotine   Transdermal Q8H     nicotine   Transdermal Daily      tamsulosin  0.4 mg Oral Daily     ticagrelor  90 mg Oral Q12H       Data     Recent Labs  Lab 09/22/18  0540 09/21/18  2148 09/21/18  1413  09/21/18  0124 09/21/18  0122   WBC 8.9  --   --   --  11.3*  --    HGB 16.1  --   --   --  16.8  --    MCV 89  --   --   --  91  --    *  --   --   --  157  --    INR  --   --   --   --  1.05  --      --   --   --  138  --    POTASSIUM 3.8  --   --   --  3.4  --    CHLORIDE 110*  --   --   --  104  --    CO2 24  --   --   --  27  --    BUN 16  --   --   --  18  --    CR 0.62*  --   --   --  0.74  --    ANIONGAP 8  --   --   --  7  --    HANNAH 8.5  --   --   --  9.1  --    *  --   --   --  162*  --    ALBUMIN  --   --   --   --  4.3  --    PROTTOTAL  --   --   --   --  7.7  --    BILITOTAL  --   --   --   --  0.9  --    ALKPHOS  --   --   --   --  52  --    ALT  --   --   --   --  32  --    AST  --   --   --   --  26  --    TROPI 1.864* 2.978* 2.140*  < > 0.062*  --    TROPONIN  --   --   --   --   --  0.05   < > = values in this interval not displayed.    Imaging:   No results found for this or any previous visit (from the past 24 hour(s)).

## 2018-09-22 NOTE — PLAN OF CARE
Problem: Patient Care Overview  Goal: Plan of Care/Patient Progress Review  Discharge Planner PT   Patient plan for discharge: Home  Current status: Pt is independent with bed mobility, transfers, and ambulation. Pt noted to be SOB with treadmill ambulation but denies complaints. Pt lia 6 mins on treadmill, speed 1.3mph, OMNI rating of 3, denies symptoms. Pt noted to have increased HR to 130's at times with treadmill ambulation, pt denies feeling any symptoms. Pt able to negotiate flight of stairs with slight SOB, OMNI rating 2, denies symptoms, HR in 80's throughout. Pt receptive to education.  Barriers to return to prior living situation: Decreased activity tolerance  Recommendations for discharge: Home with family support for household tasks, outpatient cardiac rehab  Rationale for recommendations: Pt tolerating inpt cardiac rehab well, receptive to education, would benefit from continued outpatient cardiac rehab for further education and monitoring during aerobic exercises.       Entered by: Adeola Morelos 09/22/2018 8:36 AM

## 2018-09-22 NOTE — PLAN OF CARE
Problem: Patient Care Overview  Goal: Plan of Care/Patient Progress Review  Outcome: Improving  Pt remains wo chest pain doing well with cardiac rehab resuming home meds and tolerating new RX  . Cardiology OK to discontinue home awaiting hospitilist.

## 2018-09-22 NOTE — DISCHARGE INSTRUCTIONS
[facility] will [facility] you this week to help you schedule an appointment with a Nurse Practitioner/Physicians Assistant.     [facility]  [address]  [place] [address]  Phone: [phone]        Cardiac Angiogram Discharge Instructions - Radial    After you go home:      Have an adult stay with you until tomorrow.    Drink extra fluids for 2 days.    You may resume your normal diet.    No smoking       For 24 hours - due to the sedation you received:    Relax and take it easy.    Do NOT make any important or legal decisions.    Do NOT drive or operate machines at home or at work.    Do NOT drink alcohol.    Care of Wrist Puncture Site:      For the first 24 hrs - check the puncture site every 1-2 hours while awake.    It is normal to have soreness at the puncture site and mild tingling in your hand for up to 3 days.    Remove the bandaid after 24 hours. If there is minor oozing, apply another bandaid and remove it after 12 hours.    You may shower tomorrow.  Do NOT take a bath, or use a hot tub or pool for at least 3 days. Do NOT scrub the site. Do not use lotion or powder near the puncture site.           Activity:        For 2 days:     do not use your hand or arm to support your weight (such as rising from a chair)     do not bend your wrist (such as lifting a garage door).    do not lift more than 5 pounds or exercise your arm (such as tennis, golf or bowling).    Do NOT do any heavy activity such as exercise, lifting, or straining.     Bleeding:      If you start bleeding from the site in your wrist, sit down and press firmly on/above the site for 10 minutes.     Once bleeding stops, keep arm still for 2 hours.     Call [facility] as soon as you can.       Call 911 right away if you have heavy bleeding or bleeding that does not stop.      Medicines:      If you are taking an antiplatelet medication such as Plavix, Brilinta  or Effient, do not stop taking it until you talk to your cardiologist.        If you are on Metformin (Glucophage), do not restart it until you have blood tests (within 2 to 3 days after discharge).  After you have your blood drawn, you may restart the Metformin.     Take your medications, including blood thinners, unless your provider tells you not to.  If you take Coumadin (Warfarin), have your INR checked by your provider in  3-5 days. Call your clinic to schedule this.    If you have stopped any medicines, check with your provider about when to restart them.    Follow Up Appointments:      Follow up with [facility] Nurse Practitioner at [facility] of patient preference in 7-10 days.    Call the clinic if:      You have a large or growing hard lump around the site.    The site is red, swollen, hot or tender.    Blood or fluid is draining from the site.    You have chills or a fever greater than 101 F (38 C).    Your arm feels numb, cool or changes color.    You have hives, a rash or unusual itching.    Any questions or concerns.          [facility] [place]:    [phone] [facility] (7 days a week)

## 2018-09-22 NOTE — PROGRESS NOTES
[facility]    Cardiology Progress Note     Assessment & Plan   Rk Longoria is a 63 year old male who was admitted on 9/21/2018. He has a background history of  BPH, type 2 diabetes, hypertension and tobacco dependence who presented to the Emergency Department with complaints of chest pain and shortness of breath, found to have an inferior STEMI.   He was taken emergently to the Cath Lab and underwent PCI with drug-eluting stent to an ulcerated thrombotic lesion in the proximal RCA.  Angiography showed moderate mid-LAD disease, severe ostial first OM disease, borderline significant circumflex lesion and 80% lesion to the mid to distal circumflex.       Inferior STEMI status post PCI of the proximal RCA    Coronary Artery Disease    Type 2 Diabetes    Hypertension    Tobacco Dependence  - will continue ASA and Ticagrelor  - will increase lisinopril to 10 mg daily  - will add chlorthalidone 25 mg daily  - will continue metoprolol succinate 25 mg Q daily  - will continue Lipitor 40 mg daily  - will ensure cardiac rehabilitation  - will encourage cardiac rehabilitation  - nicotine replacement   - ok to dismiss if patient is asymptomatic and tolerates BP Rx increase  - please arrange outpatient cardiology follow-up       --- patient will need staged PCI procedure     Govind Villaseñor MD    Interval History   No overnight events.  Patient states that he is ready for discharge.    Physical Exam   Temp: 98.2  F (36.8  C) Temp src: Oral BP: (!) 141/96 Pulse: 77 Heart Rate: 68 Resp: 16 SpO2: 96 % O2 Device: None (Room air)    Vitals:    09/21/18 0400   Weight: 86.2 kg (190 lb)     Vital Signs with Ranges  Temp:  [98.2  F (36.8  C)-98.6  F (37  C)] 98.2  F (36.8  C)  Pulse:  [77] 77  Heart Rate:  [68-87] 68  Resp:  [16] 16  BP: (141-168)/(78-96) 141/96  SpO2:  [96 %-99 %] 96 %  I/O last 3 completed shifts:  In: 980 [P.O.:680; I.V.:300]  Out: -   Patient Active Problem List   Diagnosis     Mild major depression  (H)     Tobacco use disorder     Pain in a tooth or teeth     Advanced directives, counseling/discussion     Hyperlipidemia with target LDL less than 130     Vitamin D deficiency     Type 2 diabetes mellitus without complication (H)     Chronic hepatitis C without hepatic coma (H)     Benign prostatic hyperplasia with weak urinary stream     STEMI (ST elevation myocardial infarction) (H)       General:  Well-appearing, appears as stated age, friendly, sensorium clear, cognition intact, friendly, cooperative.  HEENT:  No major abnormalities.  Vessels:  Nonelevated JVP.   Heart:  Normal S1, split S2.  No murmurs appreciated.  No S3 or S4.  No RV lift.  Non-displaced apical impulse.    Lungs:  Clear to auscultation bilaterally.  No wheezes, rales, rhonchi.  Adequate air movement.  Breathing comfortably.  Abdomen:  Soft, nontender, nondistended.   Extremities:  No edema.  Normal perfusion.    Medications     Percutaneous Coronary Intervention orders placed (this is information for BPA alerting)         aspirin  81 mg Oral Daily     atorvastatin  40 mg Oral Daily     insulin aspart  1-7 Units Subcutaneous TID AC     insulin aspart  1-5 Units Subcutaneous At Bedtime     lisinopril  2.5 mg Oral Daily     metoprolol succinate  25 mg Oral Daily     nicotine  1 patch Transdermal Daily     nicotine   Transdermal Q8H     nicotine   Transdermal Daily     ticagrelor  90 mg Oral Q12H       Data   Results for orders placed or performed during the hospital encounter of 09/21/18 (from the past 24 hour(s))   Troponin I   Result Value Ref Range    Troponin I ES 2.140 (HH) 0.000 - 0.045 ug/L   Glucose by meter   Result Value Ref Range    Glucose 131 (H) 70 - 99 mg/dL   Glucose by meter   Result Value Ref Range    Glucose 109 (H) 70 - 99 mg/dL   Troponin I   Result Value Ref Range    Troponin I ES 2.978 (HH) 0.000 - 0.045 ug/L   Basic metabolic panel   Result Value Ref Range    Sodium 142 133 - 144 mmol/L    Potassium 3.8 3.4 - 5.3  mmol/L    Chloride 110 (H) 94 - 109 mmol/L    Carbon Dioxide 24 20 - 32 mmol/L    Anion Gap 8 3 - 14 mmol/L    Glucose 109 (H) 70 - 99 mg/dL    Urea Nitrogen 16 7 - 30 mg/dL    Creatinine 0.62 (L) 0.66 - 1.25 mg/dL    GFR Estimate >90 >60 mL/min/1.7m2    GFR Estimate If Black >90 >60 mL/min/1.7m2    Calcium 8.5 8.5 - 10.1 mg/dL   CBC with platelets   Result Value Ref Range    WBC 8.9 4.0 - 11.0 10e9/L    RBC Count 5.08 4.4 - 5.9 10e12/L    Hemoglobin 16.1 13.3 - 17.7 g/dL    Hematocrit 45.1 40.0 - 53.0 %    MCV 89 78 - 100 fl    MCH 31.7 26.5 - 33.0 pg    MCHC 35.7 31.5 - 36.5 g/dL    RDW 13.3 10.0 - 15.0 %    Platelet Count 138 (L) 150 - 450 10e9/L   Troponin I   Result Value Ref Range    Troponin I ES 1.864 (HH) 0.000 - 0.045 ug/L   EKG 12-lead, tracing only   Result Value Ref Range    Interpretation ECG Click View Image link to view waveform and result    Glucose by meter   Result Value Ref Range    Glucose 124 (H) 70 - 99 mg/dL       Recent Labs  Lab 09/22/18  0540 09/21/18  2148 09/21/18  1413  09/21/18  0124 09/21/18  0122   WBC 8.9  --   --   --  11.3*  --    HGB 16.1  --   --   --  16.8  --    MCV 89  --   --   --  91  --    *  --   --   --  157  --    INR  --   --   --   --  1.05  --      --   --   --  138  --    POTASSIUM 3.8  --   --   --  3.4  --    CHLORIDE 110*  --   --   --  104  --    CO2 24  --   --   --  27  --    BUN 16  --   --   --  18  --    CR 0.62*  --   --   --  0.74  --    ANIONGAP 8  --   --   --  7  --    HANNAH 8.5  --   --   --  9.1  --    *  --   --   --  162*  --    ALBUMIN  --   --   --   --  4.3  --    PROTTOTAL  --   --   --   --  7.7  --    BILITOTAL  --   --   --   --  0.9  --    ALKPHOS  --   --   --   --  52  --    ALT  --   --   --   --  32  --    AST  --   --   --   --  26  --    TROPI 1.864* 2.978* 2.140*  < > 0.062*  --    TROPONIN  --   --   --   --   --  0.05   < > = values in this interval not displayed.  No results found for this or any previous visit  (from the past 24 hour(s)).

## 2018-09-22 NOTE — PLAN OF CARE
Problem: Patient Care Overview  Goal: Plan of Care/Patient Progress Review  Outcome: No Change  A/O, VSS, O2sats 99% on RA. Tele SR. Denies pain, chest pressure, SOB, or dizziness. Independent, steady. R radial site WDL, CMS intact. . Plan for rehab tomorrow.

## 2018-09-23 LAB — INTERPRETATION ECG - MUSE: NORMAL

## 2018-09-23 NOTE — PLAN OF CARE
Problem: Patient Care Overview  Goal: Plan of Care/Patient Progress Review  Cardiac Rehab Discharge Summary    Reason for therapy discharge:    Discharged to home with outpatient therapy.    Progress towards therapy goal(s). See goals on Care Plan in [facility] electronic health record for goal details.  Goals not met.  Barriers to achieving goals:   discharge from facility.    Therapy recommendation(s):    Continued therapy is recommended.  Rationale/Recommendations:  Phase II OP CR to safely progress functional activity tolerance with monitored exercise and continue cardiac health education.

## 2018-09-23 NOTE — PROGRESS NOTES
A/O, VSS, O2sats 99% on RA. Tele SR. R radial site WDL, CMS intact. Denies pain, chest pressure, SOB or dizziness. Initially pt stated [facility], so prescriptions were sent there; however, as they closed at 6 PM brilinta prescription was called to [facility] on [address], family sent to  before closing. Per their report that pharmacy did not accept their insurance so they called the prescription to the nearby [facility] pharmacy. Pt had brilinta rx with him at discharge. Family to  remaining prescriptions tomorrow AM. All discharge instructions, heart health handouts, and personal belongings given to pt. Reviewed proper use of nitroglycerin with teachback, advised not to use within 48 hrs of taking sildenafil, although pt states he has never filled this. Per Dr. Villaseñor, to resume metformin tomorrow. All questions answered. Pt d/c to home via family.

## 2018-09-24 ENCOUNTER — TELEPHONE (OUTPATIENT)
Dept: FAMILY MEDICINE | Facility: CLINIC | Age: 63
End: 2018-09-24

## 2018-09-24 NOTE — TELEPHONE ENCOUNTER
"Hospital/TCU/ED for chronic condition Discharge Protocol    \"Hi, my name is Ronal Jon, a registered nurse, and I am calling from [facility].  I am calling to follow up and see how things are going for you after your recent emergency visit/hospital/TCU stay.\"    Tell me how you are doing now that you are home?\" doing better      Discharge Instructions    \"Let's review your discharge instructions.  What is/are the follow-up recommendations?  Pt. Response: follow with PCP    \"Has an appointment with your primary care provider been scheduled?\"   Yes. (confirm)    \"When you see the provider, I would recommend that you bring your medications with you.\"    Medications    \"Tell me what changed about your medicines when you discharged?\"    Changes to chronic meds?    0-1    \"What questions do you have about your medications?\"    None     New diagnoses of heart failure, COPD, diabetes, or MI?    No              Medication reconciliation completed? Yes  Was MTM referral placed (*Make sure to put transitions as reason for referral)?   No    Call Summary    \"What questions or concerns do you have about your recent visit and your follow-up care?\"     none    \"If you have questions or things don't continue to improve, we encourage you contact us through the main clinic number (give number).  Even if the clinic is not open, triage nurses are available 24/7 to help you.     We would like you to know that our clinic has extended hours (provide information).  We also have urgent care (provide details on closest location and hours/contact info)\"      \"Thank you for your time and take care!\"    Ronal Jon RN, BSN           "

## 2018-09-25 ENCOUNTER — CARE COORDINATION (OUTPATIENT)
Dept: CARDIOLOGY | Facility: CLINIC | Age: 63
End: 2018-09-25

## 2018-09-25 NOTE — PROGRESS NOTES
Patient was evaluated by cardiology while inpatient for STEMI. Called patient to discuss any post hospital d/c questions, review medication changes, and confirm f/u appts. RN confirmed patient was d/c with the antiplatelet Brilinta. Patient denied any questions regarding new medications or changes with their PTA medications. Patient denied any SOB, chest pain, or light headedness. Rt radial cardiac cath site is without bleeding, swelling, redness or tenderness. Denies fever. RN confirmed patient needs to schedule a follow up appt w/an JENA. Patient advised to call clinic with any cardiac related questions or concerns prior to this jena't. Patient verbalized understanding and agreed with plan.

## 2018-09-26 ENCOUNTER — OFFICE VISIT (OUTPATIENT)
Dept: FAMILY MEDICINE | Facility: CLINIC | Age: 63
End: 2018-09-26
Payer: COMMERCIAL

## 2018-09-26 VITALS
HEART RATE: 64 BPM | BODY MASS INDEX: 28.57 KG/M2 | DIASTOLIC BLOOD PRESSURE: 76 MMHG | WEIGHT: 187.9 LBS | TEMPERATURE: 97.5 F | SYSTOLIC BLOOD PRESSURE: 136 MMHG | RESPIRATION RATE: 12 BRPM

## 2018-09-26 DIAGNOSIS — Z09 HOSPITAL DISCHARGE FOLLOW-UP: Primary | ICD-10-CM

## 2018-09-26 DIAGNOSIS — F17.200 TOBACCO USE DISORDER: ICD-10-CM

## 2018-09-26 DIAGNOSIS — I21.11 ST ELEVATION MYOCARDIAL INFARCTION INVOLVING RIGHT CORONARY ARTERY (H): ICD-10-CM

## 2018-09-26 DIAGNOSIS — Z23 NEED FOR PROPHYLACTIC VACCINATION AGAINST STREPTOCOCCUS PNEUMONIAE (PNEUMOCOCCUS): ICD-10-CM

## 2018-09-26 LAB — INTERPRETATION ECG - MUSE: NORMAL

## 2018-09-26 PROCEDURE — 99496 TRANSJ CARE MGMT HIGH F2F 7D: CPT | Performed by: FAMILY MEDICINE

## 2018-09-26 RX ORDER — NICOTINE 21 MG/24HR
1 PATCH, TRANSDERMAL 24 HOURS TRANSDERMAL EVERY 24 HOURS
Qty: 30 PATCH | Refills: 1 | Status: SHIPPED | OUTPATIENT
Start: 2018-09-26 | End: 2021-03-09

## 2018-09-26 ASSESSMENT — ENCOUNTER SYMPTOMS
CONSTITUTIONAL NEGATIVE: 1
CARDIOVASCULAR NEGATIVE: 1
RESPIRATORY NEGATIVE: 1

## 2018-09-26 NOTE — MR AVS SNAPSHOT
After Visit Summary   9/26/2018    Rk Longoria    MRN: [id number]           Patient Information     Date Of Birth          1955        Visit Information        Provider Department      9/26/2018 10:00 AM Darrin Mendez MD [facility]        [facility]'s [facility]     Hospital discharge follow-up    -  1    Need for prophylactic vaccination against Streptococcus pneumoniae (pneumococcus)        Tobacco use disorder        ST elevation myocardial infarction involving right coronary artery (H)           Follow-ups after your visit        Follow-up notes from your care team     Return in about 1 month (around 10/26/2018).      Your next 10 appointments already scheduled     Oct 04, 2018 10:15 AM CDT   Cardiac Evaluation with [facility]   [facility] ([facility])    [address]  [facility] [address] [phone]            Oct 09, 2018  2:30 PM CDT   Return Visit with Anjelica Doyle PA-C   [facility] ([facility])    [address]  [place] [facility] [facility] [address] [phone]              Who to contact     If you have questions or need follow up information about today's clinic visit or your schedule please contact [facility] directly at [phone].  Normal or non-critical lab and imaging results will be communicated to you by MyChart, letter or phone within 4 business days after the clinic has received the results. If you do not hear from us within 7 days, please contact the clinic through MyChart or phone. If you have a critical or abnormal lab result, we will notify you by phone as soon as possible.  Submit refill requests through [employer] or call your pharmacy and they will forward the refill request to us. Please allow 3 business days for your refill to be completed.          Additional Information About Your Visit       "  MyChart Information     [employer] lets you send messages to your doctor, view your test results, renew your prescriptions, schedule appointments and more. To sign up, go to [email or web address] . Click on \"Log in\" on the left side of the screen, which will take you to the Welcome page. Then click on \"Sign up Now\" on the right side of the page.     You will be asked to enter the access code listed below, as well as some personal information. Please follow the directions to create your username and password.     Your access code is: [email or web address]  Expires: 2018 11:04 AM     Your access code will  in 90 days. If you need help or a new code, please call your [facility] clinic or [phone].        Care EveryWhere ID     This is your Care EveryWhere ID. This could be used by other organizations to access your [facility] medical records  [id number]        Your Vitals Were     Pulse Temperature Respirations BMI (Body Mass Index)          64 97.5  F (36.4  C) (Oral) 12 28.57 kg/m2         Blood Pressure from Last 3 Encounters:   18 136/76   18 151/84   18 (!) 65/49    Weight from Last 3 Encounters:   18 187 lb 14.4 oz (85.2 kg)   18 190 lb (86.2 kg)   18 195 lb (88.5 kg)              Today, you had the following     No orders found for display         Today's Medication Changes          These changes are accurate as of 18 10:42 AM.  If you have any questions, ask your nurse or doctor.               These medicines have changed or have updated prescriptions.        Dose/Directions    * nicotine 7 MG/24HR 24 hr patch   Commonly known as:  NICODERM CQ   This may have changed:  Another medication with the same name was added. Make sure you understand how and when to take each.   Used for:  ST elevation myocardial infarction involving left anterior descending (LAD) coronary artery (H), Tobacco use disorder, ST elevation myocardial infarction involving right coronary artery " (H)   Changed by:  Darrin Mendez MD        Dose:  1 patch   Place 1 patch onto the skin daily   Quantity:  30 patch   Refills:  3       * nicotine 14 MG/24HR 24 hr patch   Commonly known as:  NICODERM CQ   This may have changed:  You were already taking a medication with the same name, and this prescription was added. Make sure you understand how and when to take each.   Used for:  Tobacco use disorder   Changed by:  Darrin Mendez MD        Dose:  1 patch   Place 1 patch onto the skin every 24 hours   Quantity:  30 patch   Refills:  1       * Notice:  This list has 2 medication(s) that are the same as other medications prescribed for you. Read the directions carefully, and ask your doctor or other care provider to review them with you.         Where to get your medicines      These medications were sent to [id number] - [place], [place] - [address]  [address] [address]     Phone:  [phone]     nicotine 14 MG/24HR 24 hr patch                Primary Care Provider Office Phone # Fax #    Darrin Mendez [phone] [address] [facility] [address]        Equal Access to Services     [facility] AH: Hadii aad ku hadasho Soomaali, waaxda luqadaha, qaybta kaalmada adeegyada, waxay idiin hayaan jose cano ah. So [facility] [phone].    ATENCIÓN: Si habla español, tiene a shipley disposición servicios gratuitos de asistencia lingüística. Llame al [phone].    We comply with applicable federal civil rights laws and [place] laws. We do not discriminate on the basis of race, color, national origin, age, disability, sex, sexual orientation, or gender identity.            Thank you!     Thank you for choosing [facility]  for your care. Our goal is always to provide you with excellent care. Hearing back from our patients is one way we can continue to improve our services. Please take a few minutes to complete  the written survey that you may receive in the mail after your visit with us. Thank you!             Your Updated Medication List - Protect others around you: Learn how to safely use, store and throw away your medicines at www.disposemymeds.org.          This list is accurate as of 9/26/18 10:42 AM.  Always use your most recent med list.                   Brand Name Dispense Instructions for use Diagnosis    acetaminophen 500 MG tablet    TYLENOL     Take 1,500 mg by mouth daily Arthritis pain        aspirin 81 MG EC tablet     90 tablet    Take 1 tablet (81 mg) by mouth daily    ST elevation myocardial infarction involving right coronary artery (H)       atorvastatin 40 MG tablet    LIPITOR    90 tablet    Take 1 tablet (40 mg) by mouth daily    ST elevation myocardial infarction involving left anterior descending (LAD) coronary artery (H)       blood glucose lancets standard    no brand specified    100 each    Use to test blood sugar 1 times daily or as directed.    Type 2 diabetes mellitus with hyperglycemia, without long-term current use of insulin (H)       blood glucose monitoring meter device kit    no brand specified    1 kit    Use to test blood sugar 1 times daily or as directed.    Type 2 diabetes mellitus with hyperglycemia, without long-term current use of insulin (H)       blood glucose monitoring test strip    no brand specified    100 strip    Use to test blood sugars 1 times daily or as directed    Type 2 diabetes mellitus with hyperglycemia, without long-term current use of insulin (H)       chlorthalidone 25 MG tablet    HYGROTON    90 tablet    Take 1 tablet (25 mg) by mouth daily    ST elevation myocardial infarction involving left anterior descending (LAD) coronary artery (H)       lisinopril 10 MG tablet    PRINIVIL/ZESTRIL    90 tablet    Take 1 tablet (10 mg) by mouth daily    ST elevation myocardial infarction involving left anterior descending (LAD) coronary artery (H)       metFORMIN 500  MG tablet    GLUCOPHAGE    360 tablet    Take 1 tablet (500 mg) by mouth 2 times daily (with meals)    Type 2 diabetes mellitus with hyperglycemia, without long-term current use of insulin (H)       metoprolol succinate 25 MG 24 hr tablet    TOPROL-XL    90 tablet    Take 1 tablet (25 mg) by mouth daily    ST elevation myocardial infarction involving left anterior descending (LAD) coronary artery (H), Tobacco use disorder       * nicotine 7 MG/24HR 24 hr patch    NICODERM CQ    30 patch    Place 1 patch onto the skin daily    ST elevation myocardial infarction involving left anterior descending (LAD) coronary artery (H), Tobacco use disorder, ST elevation myocardial infarction involving right coronary artery (H)       * nicotine 14 MG/24HR 24 hr patch    NICODERM CQ    30 patch    Place 1 patch onto the skin every 24 hours    Tobacco use disorder       nitroGLYcerin 0.4 MG/SPRAY spray    NITROLINGUAL    4.9 g    For chest pain spray 1 spray under tongue every 5 minutes for 3 doses. If symptoms persist 5 minutes after 1st dose call 911.    ST elevation myocardial infarction involving right coronary artery (H)       sildenafil 100 MG tablet    VIAGRA    12 tablet    Take 0.5-1 tablets ( mg) by mouth daily as needed    Corporo-venous occlusive erectile dysfunction       tamsulosin 0.4 MG capsule    FLOMAX    30 capsule    Take 1 capsule (0.4 mg) by mouth daily    Benign prostatic hyperplasia with weak urinary stream       ticagrelor 90 MG tablet    BRILINTA    90 tablet    Take 1 tablet (90 mg) by mouth 2 times daily    ST elevation myocardial infarction involving left anterior descending (LAD) coronary artery (H)       Vitamin D (Cholecalciferol) 1000 units Tabs      Take 2,000 Units by mouth daily        * Notice:  This list has 2 medication(s) that are the same as other medications prescribed for you. Read the directions carefully, and ask your doctor or other care provider to review them with you.

## 2018-10-04 ENCOUNTER — HOSPITAL ENCOUNTER (OUTPATIENT)
Dept: CARDIAC REHAB | Facility: CLINIC | Age: 63
End: 2018-10-04
Attending: INTERNAL MEDICINE
Payer: COMMERCIAL

## 2018-10-04 DIAGNOSIS — I21.02 ST ELEVATION MYOCARDIAL INFARCTION INVOLVING LEFT ANTERIOR DESCENDING (LAD) CORONARY ARTERY (H): ICD-10-CM

## 2018-10-04 PROCEDURE — 93798 PHYS/QHP OP CAR RHAB W/ECG: CPT

## 2018-10-04 PROCEDURE — 40000116 ZZH STATISTIC OP CR VISIT

## 2018-10-04 PROCEDURE — 40000575 ZZH STATISTIC OP CARDIAC VISIT #2

## 2018-10-04 PROCEDURE — 93797 PHYS/QHP OP CAR RHAB WO ECG: CPT

## 2018-10-08 DIAGNOSIS — I21.11 ST ELEVATION MYOCARDIAL INFARCTION INVOLVING RIGHT CORONARY ARTERY (H): Primary | ICD-10-CM

## 2018-10-08 PROBLEM — I10 BENIGN ESSENTIAL HYPERTENSION: Status: ACTIVE | Noted: 2018-10-08

## 2018-10-09 ENCOUNTER — HOSPITAL ENCOUNTER (OUTPATIENT)
Facility: CLINIC | Age: 63
End: 2018-10-09
Admitting: INTERNAL MEDICINE

## 2018-10-09 ENCOUNTER — OFFICE VISIT (OUTPATIENT)
Dept: CARDIOLOGY | Facility: CLINIC | Age: 63
End: 2018-10-09
Attending: INTERNAL MEDICINE
Payer: COMMERCIAL

## 2018-10-09 VITALS
WEIGHT: 189.2 LBS | SYSTOLIC BLOOD PRESSURE: 128 MMHG | DIASTOLIC BLOOD PRESSURE: 60 MMHG | HEART RATE: 64 BPM | BODY MASS INDEX: 28.67 KG/M2 | HEIGHT: 68 IN

## 2018-10-09 DIAGNOSIS — I25.10 CAD IN NATIVE ARTERY: ICD-10-CM

## 2018-10-09 DIAGNOSIS — I21.11 ST ELEVATION MYOCARDIAL INFARCTION INVOLVING RIGHT CORONARY ARTERY (H): Primary | ICD-10-CM

## 2018-10-09 DIAGNOSIS — I21.11 ST ELEVATION MYOCARDIAL INFARCTION INVOLVING RIGHT CORONARY ARTERY (H): ICD-10-CM

## 2018-10-09 LAB
ANION GAP SERPL CALCULATED.3IONS-SCNC: 5 MMOL/L (ref 3–14)
BUN SERPL-MCNC: 23 MG/DL (ref 7–30)
CALCIUM SERPL-MCNC: 9.4 MG/DL (ref 8.5–10.1)
CHLORIDE SERPL-SCNC: 101 MMOL/L (ref 94–109)
CO2 SERPL-SCNC: 32 MMOL/L (ref 20–32)
CREAT SERPL-MCNC: 0.8 MG/DL (ref 0.66–1.25)
GFR SERPL CREATININE-BSD FRML MDRD: >90 ML/MIN/1.7M2
GLUCOSE SERPL-MCNC: 145 MG/DL (ref 70–99)
POTASSIUM SERPL-SCNC: 4.7 MMOL/L (ref 3.4–5.3)
SODIUM SERPL-SCNC: 138 MMOL/L (ref 133–144)

## 2018-10-09 PROCEDURE — 80048 BASIC METABOLIC PNL TOTAL CA: CPT | Performed by: PHYSICIAN ASSISTANT

## 2018-10-09 PROCEDURE — 36415 COLL VENOUS BLD VENIPUNCTURE: CPT | Performed by: PHYSICIAN ASSISTANT

## 2018-10-09 PROCEDURE — 99214 OFFICE O/P EST MOD 30 MIN: CPT | Performed by: PHYSICIAN ASSISTANT

## 2018-10-09 RX ORDER — CHLORTHALIDONE 25 MG/1
12.5 TABLET ORAL DAILY
Qty: 45 TABLET | Refills: 3 | Status: SHIPPED | OUTPATIENT
Start: 2018-10-09 | End: 2018-10-29

## 2018-10-09 NOTE — PROGRESS NOTES
Please see separate dictation for HPI, PHYSICAL EXAM AND IMPRESSION/PLAN.    CURRENT MEDICATIONS:  Current Outpatient Prescriptions   Medication Sig Dispense Refill     acetaminophen (TYLENOL) 500 MG tablet Take 1,500 mg by mouth daily Arthritis pain       aspirin 81 MG EC tablet Take 1 tablet (81 mg) by mouth daily 90 tablet 3     atorvastatin (LIPITOR) 40 MG tablet Take 1 tablet (40 mg) by mouth daily 90 tablet 3     blood glucose (NO BRAND SPECIFIED) lancets standard Use to test blood sugar 1 times daily or as directed. 100 each 11     blood glucose monitoring (NO BRAND SPECIFIED) meter device kit Use to test blood sugar 1 times daily or as directed. 1 kit 0     blood glucose monitoring (NO BRAND SPECIFIED) test strip Use to test blood sugars 1 times daily or as directed 100 strip 0     chlorthalidone (HYGROTON) 25 MG tablet Take 1 tablets (25 mg) by mouth daily 45 tablet 3     lisinopril (PRINIVIL/ZESTRIL) 10 MG tablet Take 1 tablet (10 mg) by mouth daily 90 tablet 3     metFORMIN (GLUCOPHAGE) 500 MG tablet Take 1 tablet (500 mg) by mouth 2 times daily (with meals) 360 tablet 1     metoprolol succinate (TOPROL-XL) 25 MG 24 hr tablet Take 1 tablet (25 mg) by mouth daily 90 tablet 3     nicotine (NICODERM CQ) 14 MG/24HR 24 hr patch Place 1 patch onto the skin every 24 hours 30 patch 1     nicotine (NICODERM CQ) 7 MG/24HR 24 hr patch Place 1 patch onto the skin daily 30 patch 3     nitroGLYcerin (NITROLINGUAL) 0.4 MG/SPRAY spray For chest pain spray 1 spray under tongue every 5 minutes for 3 doses. If symptoms persist 5 minutes after 1st dose call 911. 4.9 g 1     tamsulosin (FLOMAX) 0.4 MG capsule Take 1 capsule (0.4 mg) by mouth daily 30 capsule 1     ticagrelor (BRILINTA) 90 MG tablet Take 1 tablet (90 mg) by mouth 2 times daily 90 tablet 3     Vitamin D, Cholecalciferol, 1000 units TABS Take 2,000 Units by mouth daily       sildenafil (VIAGRA) 100 MG tablet Take 0.5-1 tablets ( mg) by mouth daily as  needed (Patient not taking: Reported on 10/9/2018) 12 tablet 1       ALLERGIES:   No Known Allergies    PAST MEDICAL HISTORY:  Past Medical History:   Diagnosis Date     Anxiety      CAD (coronary artery disease), native coronary artery 2018     ETOH abuse      ST elevation myocardial infarction (STEMI) of inferior wall (H) 09/2018       PAST SURGICAL HISTORY:  Past Surgical History:   Procedure Laterality Date     APPENDECTOMY       CARDIAC CATHERIZATION  09/21/2018    STEMI, PAMELA pRCA, needs further staged intervention     ORTHOPEDIC SURGERY         SOCIAL HISTORY:  Social History     Social History     Marital status:      Spouse name: N/A     Number of children: N/A     Years of education: N/A     Social History Main Topics     Smoking status: Former Smoker     Quit date: 9/20/2018     Smokeless tobacco: Never Used     Alcohol use No     Drug use: No     Sexual activity: No     Other Topics Concern     None     Social History Narrative       FAMILY HISTORY:  Family History   Problem Relation Age of Onset     Breast Cancer Mother      Cancer Father      Cancer Brother      brain tumor/anerysm     Myocardial Infarction Brother        Review of Systems:  Skin:  Negative       Eyes:  Negative      ENT:  Negative      Respiratory:  Negative       Cardiovascular:    dizziness;Positive for    Gastroenterology: Positive for heartburn    Genitourinary:  not assessed      Musculoskeletal:  not assessed      Neurologic:  not assessed      Psychiatric:  not assessed      Heme/Lymph/Imm:  Negative      Endocrine:  Negative         Reviewed. Remainder of the note dictated.    Anjelica Doyle PA-C

## 2018-10-09 NOTE — PATIENT INSTRUCTIONS
Thank you for your [facility] visit today. Your provider has recommended the following:  Medication Changes:  Decrease the chlorthalidone to 1/2 tab (12.5mg) once a day  Recommendations:  Stenting to the circumflex artery  Follow-up:  See Anjelica MEJÍA for cardiology follow up after the stenting.    Reminder:  Please bring in your current medication list or your medication, over the counter supplements and vitamin bottles as we will review these at each office visit.

## 2018-10-09 NOTE — LETTER
10/9/2018      Darrin Mendez MD  [address]  [facility] [address]      RE: Rk Cunninghamzer       Dear Colleague,    I had the pleasure of seeing Rk Longoria in the [facility].    Service Date: 10/09/2018      HISTORY OF PRESENT ILLNESS:  Mr. Longoria is a pleasant, 63-year-old gentleman who presents to Cardiology office today for followup after recent hospitalization at [facility] secondary to an inferior STEMI.      His past cardiovascular history includes type 2 diabetes, hypertension and tobacco use.  On the day of presentation, he was climbing stairs when he developed dizziness that then was accompanied by chest discomfort and shortness of breath.  He was found to be having an inferior STEMI.  He was taken emergently to the cardiac catheterization lab.  He was found to have a 95% thrombotic lesion in the proximal RCA.  He underwent drug-eluting stent placement x1 from the proximal to the mid RCA.  He also was found to have significant disease in the left circumflex with an 80% lesion in the proximal circumflex at the bifurcation of the OM1.  He also was noted to have an 80% lesion in the ostial portion of the OM1.  The mid circumflex also had an 80%-90% stenosis.  It was recommended that he undergo a staged PCI to this vessel.  There was moderate disease in the LAD.  Echocardiography showed preserved LV systolic function.  There was apical dyskinesis.  The echocardiogram suggested elevated LV filling pressures, but his LVEDP on his cardiac catheterization was normal.  He was started on appropriate medical therapy, did well and was able to discharge home.      He presents to the office today for followup.  He is accompanied by his son.  He states that overall he has been feeling well.  He has not had any recurrent anginal symptoms.  He participated in the intake session of cardiac rehab but felt that this was not going to be beneficial for him as he  already has a routine exercise program.  He said he has been walking every day and has not had any cardiovascular symptoms or limitations.  He does note that about an hour after taking his morning medications he develops some lightheadedness/dizziness.  This lasts for a couple of hours and then typically improves.  He also notices lightheadedness when he goes from a sitting to standing position.  He is taking his medications as directed.  He denies any complaints related to his right radial arterial access site.  Fortunately, he has achieved tobacco cessation.      CURRENT CARDIAC MEDICATIONS:   1.  Aspirin 81 mg daily.   2.  Atorvastatin 40 mg daily.   3.  Chlorthalidone 25 mg daily.   4.  Lisinopril 10 mg daily.   5.  Toprol-XL 25 mg daily.   6.  Nitrospray p.r.n.   7.  Brilinta 90 mg b.i.d.      The remainder of his medications, allergies, review of systems and PMHx were reviewed and as are documented separately.      PHYSICAL EXAMINATION:   GENERAL:  The patient is a pleasant, 63-year-old gentleman who appears his stated age.  He is in no apparent distress.   VITAL SIGNS:  Blood pressure is 128/60, pulse 64, weight is 189 pounds, which is stable.  Breathing is nonlabored.   LUNGS:  Clear to auscultation.   CARDIAC:  Examination reveals a regular rate and rhythm.  I do not appreciate any murmur.   EXTREMITIES:  Lower extremities show no evidence of edema.   NEUROLOGIC:  Alert and oriented.      LABORATORY:  Basic metabolic panel from today shows a sodium of 138, potassium of 4.7, BUN of 23, creatinine of 0.8.      ASSESSMENT AND PLAN:  The patient is a 63-year-old gentleman with a history of type 2 diabetes and hypertension along with tobacco use who recently suffered an inferior STEMI.  He was found to have a 95% thrombotic lesion in the proximal RCA for which he underwent drug-eluting stent placement x1.  He was also noted to have significant disease in the left circumflex and OM1, and it has been recommended  that he undergo a staged PCI.  He is currently on dual antiplatelet therapy and is not having any difficulty or side effects from this.  We discussed the indications for, along with the risks and benefits of staged PCI.  He understands and is willing to proceed.  We will arrange for this in the next couple of weeks.  Again, he is not having any anginal symptoms at this time.      He is complaining of some lightheadedness after taking his morning medications.  I did suggest that we decrease his dose of chlorthalidone to 12.5 mg daily.  If his symptoms do not improve, I have asked him to contact me in about a week and we can consider further medication changes.  For the time being, he will continue his metoprolol, lisinopril and atorvastatin unchanged.      I will see him back in the Cardiology Clinic about a week after his staged PCI.  Again, I have encouraged him to contact me with any questions or concerns in the interim.         JONA SORENSEN PA-C             D: 10/09/2018   T: 10/10/2018   MT: IRIS      Name:     JULIO HA   MRN:      [id number]        Account:      [id number]   :      1955           Service Date: 10/09/2018      Document: [id number]           Outpatient Encounter Prescriptions as of 10/9/2018   Medication Sig Dispense Refill     acetaminophen (TYLENOL) 500 MG tablet Take 1,500 mg by mouth daily Arthritis pain       aspirin 81 MG EC tablet Take 1 tablet (81 mg) by mouth daily 90 tablet 3     atorvastatin (LIPITOR) 40 MG tablet Take 1 tablet (40 mg) by mouth daily 90 tablet 3     blood glucose (NO BRAND SPECIFIED) lancets standard Use to test blood sugar 1 times daily or as directed. 100 each 11     blood glucose monitoring (NO BRAND SPECIFIED) meter device kit Use to test blood sugar 1 times daily or as directed. 1 kit 0     blood glucose monitoring (NO BRAND SPECIFIED) test strip Use to test blood sugars 1 times daily or as directed 100 strip 0     chlorthalidone  (HYGROTON) 25 MG tablet Take 0.5 tablets (12.5 mg) by mouth daily 45 tablet 3     lisinopril (PRINIVIL/ZESTRIL) 10 MG tablet Take 1 tablet (10 mg) by mouth daily 90 tablet 3     metFORMIN (GLUCOPHAGE) 500 MG tablet Take 1 tablet (500 mg) by mouth 2 times daily (with meals) 360 tablet 1     metoprolol succinate (TOPROL-XL) 25 MG 24 hr tablet Take 1 tablet (25 mg) by mouth daily 90 tablet 3     nicotine (NICODERM CQ) 14 MG/24HR 24 hr patch Place 1 patch onto the skin every 24 hours 30 patch 1     nicotine (NICODERM CQ) 7 MG/24HR 24 hr patch Place 1 patch onto the skin daily 30 patch 3     nitroGLYcerin (NITROLINGUAL) 0.4 MG/SPRAY spray For chest pain spray 1 spray under tongue every 5 minutes for 3 doses. If symptoms persist 5 minutes after 1st dose call 911. 4.9 g 1     tamsulosin (FLOMAX) 0.4 MG capsule Take 1 capsule (0.4 mg) by mouth daily 30 capsule 1     ticagrelor (BRILINTA) 90 MG tablet Take 1 tablet (90 mg) by mouth 2 times daily 90 tablet 3     Vitamin D, Cholecalciferol, 1000 units TABS Take 2,000 Units by mouth daily       sildenafil (VIAGRA) 100 MG tablet Take 0.5-1 tablets ( mg) by mouth daily as needed (Patient not taking: Reported on 10/9/2018) 12 tablet 1     [DISCONTINUED] chlorthalidone (HYGROTON) 25 MG tablet Take 1 tablet (25 mg) by mouth daily 90 tablet 3     No facility-administered encounter medications on file as of 10/9/2018.        Again, thank you for allowing me to participate in the care of your patient.      Sincerely,    Anjelica Doyle PA-C     [facility]

## 2018-10-09 NOTE — MR AVS SNAPSHOT
After Visit Summary   10/9/2018    Rk Longoria    MRN: [id number]           Patient Information     Date Of Birth          1955        Visit Information        Provider Department      10/9/2018 2:30 PM Anjelica Doyle PA-C [facility]        Today's Diagnoses     ST elevation myocardial infarction involving right coronary artery (H)    -  1    CAD in native artery        ST elevation myocardial infarction involving left anterior descending (LAD) coronary artery (H)          Care Instructions    Thank you for your  Heart Care visit today. Your provider has recommended the following:  Medication Changes:  Decrease the chlorthalidone to 1/2 tab (12.5mg) once a day  Recommendations:  Stenting to the circumflex artery  Follow-up:  See Anjelica MEJÍA for cardiology follow up after the stenting.    Reminder:  Please bring in your current medication list or your medication, over the counter supplements and vitamin bottles as we will review these at each office visit.                  Follow-ups after your visit        Additional Services     Follow-Up with Cardiac Advanced Practice Provider                 Your next 10 appointments already scheduled     Oct 19, 2018  1:15 PM CDT   LAB with RU LAB   [facility] [place] ([facility])    [address]  [facility] [id number] [phone]           Please do not eat 10-12 hours before your appointment if you are coming in fasting for labs on lipids, cholesterol, or glucose (sugar). This does not apply to pregnant women. Water, hot tea and black coffee (with nothing added) are okay. Do not drink other fluids, diet soda or chew gum.            Oct 23, 2018  8:30 AM CDT   Cath 90 Minute with SHCVR1   [facility] ([facility])    [address]  [place] [address] [phone]            Oct 29,  "  1:10 PM CDT   Return Visit with Anjelica Doyle PA-C   [facility] ([facility])    [address]  [facility] [id number] [phone]              Future tests that were ordered for you today     Open Future Orders        Priority Expected Expires Ordered    Follow-Up with Cardiac Advanced Practice Provider Routine 10/30/2018 10/8/2020 10/9/2018    Cardiac Cath: Coronary Angiography Adult Order Routine 10/16/2018 10/9/2019 10/9/2018            Who to contact     If you have questions or need follow up information about today's clinic visit or your schedule please contact [facility] directly at [phone].  Normal or non-critical lab and imaging results will be communicated to you by MyChart, letter or phone within 4 business days after the clinic has received the results. If you do not hear from us within 7 days, please contact the clinic through algranohart or phone. If you have a critical or abnormal lab result, we will notify you by phone as soon as possible.  Submit refill requests through [employer] or call your pharmacy and they will forward the refill request to us. Please allow 3 business days for your refill to be completed.          Additional Information About Your Visit        [employer] Information     [employer] lets you send messages to your doctor, view your test results, renew your prescriptions, schedule appointments and more. To sign up, go to [email or web address] . Click on \"Log in\" on the left side of the screen, which will take you to the Welcome page. Then click on \"Sign up Now\" on the right side of the page.     You will be asked to enter the access code listed below, as well as some personal information. Please follow the directions to create your username and password.     Your access code is: [email or web address]  Expires: 2018 11:04 AM     Your access code will  in 90 " "days. If you need help or a new code, please call your [place] clinic or [phone].        Care EveryWhere ID     This is your Care EveryWhere ID. This could be used by other organizations to access your [place] medical records  [id number]        Your Vitals Were     Pulse Height BMI (Body Mass Index)             64 1.727 m (5' 8\") 28.77 kg/m2          Blood Pressure from Last 3 Encounters:   10/09/18 128/60   09/26/18 136/76   09/22/18 151/84    Weight from Last 3 Encounters:   10/09/18 85.8 kg (189 lb 3.2 oz)   09/26/18 85.2 kg (187 lb 14.4 oz)   09/21/18 86.2 kg (190 lb)              We Performed the Following     Follow-Up with Cardiac Advanced Practice Provider          Today's Medication Changes          These changes are accurate as of 10/9/18  3:44 PM.  If you have any questions, ask your nurse or doctor.               These medicines have changed or have updated prescriptions.        Dose/Directions    chlorthalidone 25 MG tablet   Commonly known as:  HYGROTON   This may have changed:  how much to take   Used for:  ST elevation myocardial infarction involving left anterior descending (LAD) coronary artery (H)   Changed by:  Anjelica Doyle, PADoloresC        Dose:  12.5 mg   Take 0.5 tablets (12.5 mg) by mouth daily   Quantity:  45 tablet   Refills:  3            Where to get your medicines      These medications were sent to [id number] [place], [place] - [address]  [address] [address] [facility] [address]     Phone:  [phone]     chlorthalidone 25 MG tablet                Primary Care Provider Office Phone # Fax #    Darrin Mendez [phone] [id number]  [facility] [address]        Equal Access to Services     NUNO JOSUE : Suzie Granados, warameshda ivonne, qaybta kaalmada karin, wilfrid packer. So [facility] [phone].    ATENCIÓN: Si habla español, tiene a shipley disposición servicios gratuitos de " asistencia lingüística. Holden al [phone].    We comply with applicable federal civil rights laws and [place] laws. We do not discriminate on the basis of race, color, national origin, age, disability, sex, sexual orientation, or gender identity.            Thank you!     Thank you for choosing [facility]  for your care. Our goal is always to provide you with excellent care. Hearing back from our patients is one way we can continue to improve our services. Please take a few minutes to complete the written survey that you may receive in the mail after your visit with us. Thank you!             Your Updated Medication List - Protect others around you: Learn how to safely use, store and throw away your medicines at www.disposemymeds.org.          This list is accurate as of 10/9/18  3:44 PM.  Always use your most recent med list.                   Brand Name Dispense Instructions for use Diagnosis    acetaminophen 500 MG tablet    TYLENOL     Take 1,500 mg by mouth daily Arthritis pain        aspirin 81 MG EC tablet     90 tablet    Take 1 tablet (81 mg) by mouth daily    ST elevation myocardial infarction involving right coronary artery (H)       atorvastatin 40 MG tablet    LIPITOR    90 tablet    Take 1 tablet (40 mg) by mouth daily    ST elevation myocardial infarction involving left anterior descending (LAD) coronary artery (H)       blood glucose lancets standard    no brand specified    100 each    Use to test blood sugar 1 times daily or as directed.    Type 2 diabetes mellitus with hyperglycemia, without long-term current use of insulin (H)       blood glucose monitoring meter device kit    no brand specified    1 kit    Use to test blood sugar 1 times daily or as directed.    Type 2 diabetes mellitus with hyperglycemia, without long-term current use of insulin (H)       blood glucose monitoring test strip    no brand specified    100 strip    Use to test blood  sugars 1 times daily or as directed    Type 2 diabetes mellitus with hyperglycemia, without long-term current use of insulin (H)       chlorthalidone 25 MG tablet    HYGROTON    45 tablet    Take 0.5 tablets (12.5 mg) by mouth daily    ST elevation myocardial infarction involving left anterior descending (LAD) coronary artery (H)       lisinopril 10 MG tablet    PRINIVIL/ZESTRIL    90 tablet    Take 1 tablet (10 mg) by mouth daily    ST elevation myocardial infarction involving left anterior descending (LAD) coronary artery (H)       metFORMIN 500 MG tablet    GLUCOPHAGE    360 tablet    Take 1 tablet (500 mg) by mouth 2 times daily (with meals)    Type 2 diabetes mellitus with hyperglycemia, without long-term current use of insulin (H)       metoprolol succinate 25 MG 24 hr tablet    TOPROL-XL    90 tablet    Take 1 tablet (25 mg) by mouth daily    ST elevation myocardial infarction involving left anterior descending (LAD) coronary artery (H), Tobacco use disorder       * nicotine 7 MG/24HR 24 hr patch    NICODERM CQ    30 patch    Place 1 patch onto the skin daily    ST elevation myocardial infarction involving left anterior descending (LAD) coronary artery (H), Tobacco use disorder, ST elevation myocardial infarction involving right coronary artery (H)       * nicotine 14 MG/24HR 24 hr patch    NICODERM CQ    30 patch    Place 1 patch onto the skin every 24 hours    Tobacco use disorder       nitroGLYcerin 0.4 MG/SPRAY spray    NITROLINGUAL    4.9 g    For chest pain spray 1 spray under tongue every 5 minutes for 3 doses. If symptoms persist 5 minutes after 1st dose call 911.    ST elevation myocardial infarction involving right coronary artery (H)       sildenafil 100 MG tablet    VIAGRA    12 tablet    Take 0.5-1 tablets ( mg) by mouth daily as needed    Corporo-venous occlusive erectile dysfunction       tamsulosin 0.4 MG capsule    FLOMAX    30 capsule    Take 1 capsule (0.4 mg) by mouth daily    Benign  prostatic hyperplasia with weak urinary stream       ticagrelor 90 MG tablet    BRILINTA    90 tablet    Take 1 tablet (90 mg) by mouth 2 times daily    ST elevation myocardial infarction involving left anterior descending (LAD) coronary artery (H)       Vitamin D (Cholecalciferol) 1000 units Tabs      Take 2,000 Units by mouth daily        * Notice:  This list has 2 medication(s) that are the same as other medications prescribed for you. Read the directions carefully, and ask your doctor or other care provider to review them with you.

## 2018-10-09 NOTE — LETTER
10/9/2018    Darrin Mendez [place]  [address]  [facility] [address]    RE: Rk Longoria       Dear Colleague,    I had the pleasure of seeing Rk Longoria in the [facility].    Please see separate dictation for HPI, PHYSICAL EXAM AND IMPRESSION/PLAN.    CURRENT MEDICATIONS:  Current Outpatient Prescriptions   Medication Sig Dispense Refill     acetaminophen (TYLENOL) 500 MG tablet Take 1,500 mg by mouth daily Arthritis pain       aspirin 81 MG EC tablet Take 1 tablet (81 mg) by mouth daily 90 tablet 3     atorvastatin (LIPITOR) 40 MG tablet Take 1 tablet (40 mg) by mouth daily 90 tablet 3     blood glucose (NO BRAND SPECIFIED) lancets standard Use to test blood sugar 1 times daily or as directed. 100 each 11     blood glucose monitoring (NO BRAND SPECIFIED) meter device kit Use to test blood sugar 1 times daily or as directed. 1 kit 0     blood glucose monitoring (NO BRAND SPECIFIED) test strip Use to test blood sugars 1 times daily or as directed 100 strip 0     chlorthalidone (HYGROTON) 25 MG tablet Take 1 tablets (25 mg) by mouth daily 45 tablet 3     lisinopril (PRINIVIL/ZESTRIL) 10 MG tablet Take 1 tablet (10 mg) by mouth daily 90 tablet 3     metFORMIN (GLUCOPHAGE) 500 MG tablet Take 1 tablet (500 mg) by mouth 2 times daily (with meals) 360 tablet 1     metoprolol succinate (TOPROL-XL) 25 MG 24 hr tablet Take 1 tablet (25 mg) by mouth daily 90 tablet 3     nicotine (NICODERM CQ) 14 MG/24HR 24 hr patch Place 1 patch onto the skin every 24 hours 30 patch 1     nicotine (NICODERM CQ) 7 MG/24HR 24 hr patch Place 1 patch onto the skin daily 30 patch 3     nitroGLYcerin (NITROLINGUAL) 0.4 MG/SPRAY spray For chest pain spray 1 spray under tongue every 5 minutes for 3 doses. If symptoms persist 5 minutes after 1st dose call 911. 4.9 g 1     tamsulosin (FLOMAX) 0.4 MG capsule Take 1 capsule (0.4 mg) by mouth daily 30 capsule 1     ticagrelor (BRILINTA) 90 MG tablet  Take 1 tablet (90 mg) by mouth 2 times daily 90 tablet 3     Vitamin D, Cholecalciferol, 1000 units TABS Take 2,000 Units by mouth daily       sildenafil (VIAGRA) 100 MG tablet Take 0.5-1 tablets ( mg) by mouth daily as needed (Patient not taking: Reported on 10/9/2018) 12 tablet 1       ALLERGIES:   No Known Allergies    PAST MEDICAL HISTORY:  Past Medical History:   Diagnosis Date     Anxiety      CAD (coronary artery disease), native coronary artery 2018     ETOH abuse      ST elevation myocardial infarction (STEMI) of inferior wall (H) 09/2018       PAST SURGICAL HISTORY:  Past Surgical History:   Procedure Laterality Date     APPENDECTOMY       CARDIAC CATHERIZATION  09/21/2018    STEMI, PAMELA pRCA, needs further staged intervention     ORTHOPEDIC SURGERY         SOCIAL HISTORY:  Social History     Social History     Marital status:      Spouse name: N/A     Number of children: N/A     Years of education: N/A     Social History Main Topics     Smoking status: Former Smoker     Quit date: 9/20/2018     Smokeless tobacco: Never Used     Alcohol use No     Drug use: No     Sexual activity: No     Other Topics Concern     None     Social History Narrative       FAMILY HISTORY:  Family History   Problem Relation Age of Onset     Breast Cancer Mother      Cancer Father      Cancer Brother      brain tumor/anerysm     Myocardial Infarction Brother        Review of Systems:  Skin:  Negative       Eyes:  Negative      ENT:  Negative      Respiratory:  Negative       Cardiovascular:    dizziness;Positive for    Gastroenterology: Positive for heartburn    Genitourinary:  not assessed      Musculoskeletal:  not assessed      Neurologic:  not assessed      Psychiatric:  not assessed      Heme/Lymph/Imm:  Negative      Endocrine:  Negative         Reviewed. Remainder of the note dictated.    Anjelica Doyle PA-C        Service Date: 10/09/2018      HISTORY OF PRESENT ILLNESS:  Mr. Longoria is a pleasant,  63-year-old gentleman who presents to Cardiology office today for followup after recent hospitalization at [facility] secondary to an inferior STEMI.      His past cardiovascular history includes type 2 diabetes, hypertension and tobacco use.  On the day of presentation, he was climbing stairs when he developed dizziness that then was accompanied by chest discomfort and shortness of breath.  He was found to be having an inferior STEMI.  He was taken emergently to the cardiac catheterization lab.  He was found to have a 95% thrombotic lesion in the proximal RCA.  He underwent drug-eluting stent placement x1 from the proximal to the mid RCA.  He also was found to have significant disease in the left circumflex with an 80% lesion in the proximal circumflex at the bifurcation of the OM1.  He also was noted to have an 80% lesion in the ostial portion of the OM1.  The mid circumflex also had an 80%-90% stenosis.  It was recommended that he undergo a staged PCI to this vessel.  There was moderate disease in the LAD.  Echocardiography showed preserved LV systolic function.  There was apical dyskinesis.  The echocardiogram suggested elevated LV filling pressures, but his LVEDP on his cardiac catheterization was normal.  He was started on appropriate medical therapy, did well and was able to discharge home.      He presents to the office today for followup.  He is accompanied by his son.  He states that overall he has been feeling well.  He has not had any recurrent anginal symptoms.  He participated in the intake session of cardiac rehab but felt that this was not going to be beneficial for him as he already has a routine exercise program.  He said he has been walking every day and has not had any cardiovascular symptoms or limitations.  He does note that about an hour after taking his morning medications he develops some lightheadedness/dizziness.  This lasts for a couple of hours and then typically improves.  He  also notices lightheadedness when he goes from a sitting to standing position.  He is taking his medications as directed.  He denies any complaints related to his right radial arterial access site.  Fortunately, he has achieved tobacco cessation.      CURRENT CARDIAC MEDICATIONS:   1.  Aspirin 81 mg daily.   2.  Atorvastatin 40 mg daily.   3.  Chlorthalidone 25 mg daily.   4.  Lisinopril 10 mg daily.   5.  Toprol-XL 25 mg daily.   6.  Nitrospray p.r.n.   7.  Brilinta 90 mg b.i.d.      The remainder of his medications, allergies, review of systems and PMHx were reviewed and as are documented separately.      PHYSICAL EXAMINATION:   GENERAL:  The patient is a pleasant, 63-year-old gentleman who appears his stated age.  He is in no apparent distress.   VITAL SIGNS:  Blood pressure is 128/60, pulse 64, weight is 189 pounds, which is stable.  Breathing is nonlabored.   LUNGS:  Clear to auscultation.   CARDIAC:  Examination reveals a regular rate and rhythm.  I do not appreciate any murmur.   EXTREMITIES:  Lower extremities show no evidence of edema.   NEUROLOGIC:  Alert and oriented.      LABORATORY:  Basic metabolic panel from today shows a sodium of 138, potassium of 4.7, BUN of 23, creatinine of 0.8.      ASSESSMENT AND PLAN:  The patient is a 63-year-old gentleman with a history of type 2 diabetes and hypertension along with tobacco use who recently suffered an inferior STEMI.  He was found to have a 95% thrombotic lesion in the proximal RCA for which he underwent drug-eluting stent placement x1.  He was also noted to have significant disease in the left circumflex and OM1, and it has been recommended that he undergo a staged PCI.  He is currently on dual antiplatelet therapy and is not having any difficulty or side effects from this.  We discussed the indications for, along with the risks and benefits of staged PCI.  He understands and is willing to proceed.  We will arrange for this in the next couple of weeks.   Again, he is not having any anginal symptoms at this time.      He is complaining of some lightheadedness after taking his morning medications.  I did suggest that we decrease his dose of chlorthalidone to 12.5 mg daily.  If his symptoms do not improve, I have asked him to contact me in about a week and we can consider further medication changes.  For the time being, he will continue his metoprolol, lisinopril and atorvastatin unchanged.      I will see him back in the Cardiology Clinic about a week after his staged PCI.  Again, I have encouraged him to contact me with any questions or concerns in the interim.         ANJELICA SORENSEN PA-C             D: 10/09/2018   T: 10/10/2018   MT: IRIS      Name:     JULIO HA   MRN:      [id number]        Account:      [id number]   :      1955           Service Date: 10/09/2018      Document: [id number]        Thank you for allowing me to participate in the care of your patient.      Sincerely,     Anjelica Sorensen PA-C     [facility]    cc:   Gabriela Schwab MD  [address] [place] [id number]

## 2018-10-10 NOTE — PROGRESS NOTES
Service Date: 10/09/2018      HISTORY OF PRESENT ILLNESS:  Mr. Longoria is a pleasant, 63-year-old gentleman who presents to Cardiology office today for followup after recent hospitalization at [facility] secondary to an inferior STEMI.      His past cardiovascular history includes type 2 diabetes, hypertension and tobacco use.  On the day of presentation, he was climbing stairs when he developed dizziness that then was accompanied by chest discomfort and shortness of breath.  He was found to be having an inferior STEMI.  He was taken emergently to the cardiac catheterization lab.  He was found to have a 95% thrombotic lesion in the proximal RCA.  He underwent drug-eluting stent placement x1 from the proximal to the mid RCA.  He also was found to have significant disease in the left circumflex with an 80% lesion in the proximal circumflex at the bifurcation of the OM1.  He also was noted to have an 80% lesion in the ostial portion of the OM1.  The mid circumflex also had an 80%-90% stenosis.  It was recommended that he undergo a staged PCI to this vessel.  There was moderate disease in the LAD.  Echocardiography showed preserved LV systolic function.  There was apical dyskinesis.  The echocardiogram suggested elevated LV filling pressures, but his LVEDP on his cardiac catheterization was normal.  He was started on appropriate medical therapy, did well and was able to discharge home.      He presents to the office today for followup.  He is accompanied by his son.  He states that overall he has been feeling well.  He has not had any recurrent anginal symptoms.  He participated in the intake session of cardiac rehab but felt that this was not going to be beneficial for him as he already has a routine exercise program.  He said he has been walking every day and has not had any cardiovascular symptoms or limitations.  He does note that about an hour after taking his morning medications he develops some  lightheadedness/dizziness.  This lasts for a couple of hours and then typically improves.  He also notices lightheadedness when he goes from a sitting to standing position.  He is taking his medications as directed.  He denies any complaints related to his right radial arterial access site.  Fortunately, he has achieved tobacco cessation.      CURRENT CARDIAC MEDICATIONS:   1.  Aspirin 81 mg daily.   2.  Atorvastatin 40 mg daily.   3.  Chlorthalidone 25 mg daily.   4.  Lisinopril 10 mg daily.   5.  Toprol-XL 25 mg daily.   6.  Nitrospray p.r.n.   7.  Brilinta 90 mg b.i.d.      The remainder of his medications, allergies, review of systems and PMHx were reviewed and as are documented separately.      PHYSICAL EXAMINATION:   GENERAL:  The patient is a pleasant, 63-year-old gentleman who appears his stated age.  He is in no apparent distress.   VITAL SIGNS:  Blood pressure is 128/60, pulse 64, weight is 189 pounds, which is stable.  Breathing is nonlabored.   LUNGS:  Clear to auscultation.   CARDIAC:  Examination reveals a regular rate and rhythm.  I do not appreciate any murmur.   EXTREMITIES:  Lower extremities show no evidence of edema.   NEUROLOGIC:  Alert and oriented.      LABORATORY:  Basic metabolic panel from today shows a sodium of 138, potassium of 4.7, BUN of 23, creatinine of 0.8.      ASSESSMENT AND PLAN:  The patient is a 63-year-old gentleman with a history of type 2 diabetes and hypertension along with tobacco use who recently suffered an inferior STEMI.  He was found to have a 95% thrombotic lesion in the proximal RCA for which he underwent drug-eluting stent placement x1.  He was also noted to have significant disease in the left circumflex and OM1, and it has been recommended that he undergo a staged PCI.  He is currently on dual antiplatelet therapy and is not having any difficulty or side effects from this.  We discussed the indications for, along with the risks and benefits of staged PCI.  He  understands and is willing to proceed.  We will arrange for this in the next couple of weeks.  Again, he is not having any anginal symptoms at this time.      He is complaining of some lightheadedness after taking his morning medications.  I did suggest that we decrease his dose of chlorthalidone to 12.5 mg daily.  If his symptoms do not improve, I have asked him to contact me in about a week and we can consider further medication changes.  For the time being, he will continue his metoprolol, lisinopril and atorvastatin unchanged.      I will see him back in the Cardiology Clinic about a week after his staged PCI.  Again, I have encouraged him to contact me with any questions or concerns in the interim.         JONA SORENSEN PA-C             D: 10/09/2018   T: 10/10/2018   MT: IRIS      Name:     JULIO HA   MRN:      [id number]        Account:      [id number]   :      1955           Service Date: 10/09/2018      Document: [id number]

## 2018-10-18 ENCOUNTER — TELEPHONE (OUTPATIENT)
Dept: CARDIOLOGY | Facility: CLINIC | Age: 63
End: 2018-10-18

## 2018-10-18 DIAGNOSIS — I10 BENIGN ESSENTIAL HYPERTENSION: Primary | ICD-10-CM

## 2018-10-18 DIAGNOSIS — I21.11 ST ELEVATION MYOCARDIAL INFARCTION INVOLVING RIGHT CORONARY ARTERY (H): ICD-10-CM

## 2018-10-18 RX ORDER — POTASSIUM CHLORIDE 750 MG/1
20 TABLET, EXTENDED RELEASE ORAL
Status: CANCELLED | OUTPATIENT
Start: 2018-10-18

## 2018-10-18 RX ORDER — LIDOCAINE 40 MG/G
CREAM TOPICAL
Status: CANCELLED | OUTPATIENT
Start: 2018-10-18

## 2018-10-18 RX ORDER — ASPIRIN 81 MG/1
81 TABLET ORAL DAILY
Status: CANCELLED | OUTPATIENT
Start: 2018-10-18

## 2018-10-18 RX ORDER — SODIUM CHLORIDE 9 MG/ML
INJECTION, SOLUTION INTRAVENOUS CONTINUOUS
Status: CANCELLED | OUTPATIENT
Start: 2018-10-18

## 2018-10-18 NOTE — PROGRESS NOTES
Orders for left heart cath entered. Order for post procedure BMP entered. Pt needs lab done 10/25/18 after holding Metformin for procedure. Pt will be called on 10/25 if he is ok to resume Metformin. DIETER Zavala

## 2018-10-18 NOTE — TELEPHONE ENCOUNTER
Coronary angiogram/[facility] prep instructions.     Patient is scheduled for a left heart cath/coronary angiogram at [facility], on 10/23/18. Check in time is at 6:30am and procedure time is at 8:30am.  Advised patient not eat or drink after midnight on 10/22/18.      Advised to hold Metformin the day of the procedure should continue to hold until after we can review results of BMP lab which is scheduled on 10/25 at [facility].     Patient is not taking an anticoagulant.     Patient should continue their current dose of Aspirin with their other daily medications the morning of the procedure with small sips of water.     Verified patient does not have an allergy to contrast dye.     Verified patient has someone available to drive them home from the hospital and can stay with them for 24 hours after the procedure.     This is a same day procedure, however advised to should pack an overnight bag just in case he may need to stay overnight.    Patient had no questions about their prep instructions.   Kathleen GARCIAS  [facility]

## 2018-10-23 ENCOUNTER — HOSPITAL ENCOUNTER (OUTPATIENT)
Facility: CLINIC | Age: 63
Discharge: HOME | End: 2018-10-24
Attending: INTERNAL MEDICINE | Admitting: INTERNAL MEDICINE
Payer: COMMERCIAL

## 2018-10-23 ENCOUNTER — APPOINTMENT (OUTPATIENT)
Dept: CARDIOLOGY | Facility: CLINIC | Age: 63
End: 2018-10-23
Attending: PHYSICIAN ASSISTANT
Payer: COMMERCIAL

## 2018-10-23 DIAGNOSIS — I25.10 CAD IN NATIVE ARTERY: ICD-10-CM

## 2018-10-23 DIAGNOSIS — Z98.61 POSTSURGICAL PERCUTANEOUS TRANSLUMINAL CORONARY ANGIOPLASTY STATUS: ICD-10-CM

## 2018-10-23 DIAGNOSIS — I25.10 CORONARY ARTERY DISEASE INVOLVING NATIVE CORONARY ARTERY OF NATIVE HEART WITHOUT ANGINA PECTORIS: ICD-10-CM

## 2018-10-23 PROBLEM — Z98.890 STATUS POST CORONARY ANGIOGRAM: Status: ACTIVE | Noted: 2018-10-23

## 2018-10-23 LAB
ANION GAP SERPL CALCULATED.3IONS-SCNC: 6 MMOL/L (ref 3–14)
APTT PPP: 34 SEC (ref 22–37)
BUN SERPL-MCNC: 19 MG/DL (ref 7–30)
CALCIUM SERPL-MCNC: 8.7 MG/DL (ref 8.5–10.1)
CHLORIDE SERPL-SCNC: 108 MMOL/L (ref 94–109)
CO2 SERPL-SCNC: 28 MMOL/L (ref 20–32)
CREAT SERPL-MCNC: 0.78 MG/DL (ref 0.66–1.25)
ERYTHROCYTE [DISTWIDTH] IN BLOOD BY AUTOMATED COUNT: 13.3 % (ref 10–15)
GFR SERPL CREATININE-BSD FRML MDRD: >90 ML/MIN/1.7M2
GLUCOSE BLDC GLUCOMTR-MCNC: 116 MG/DL (ref 70–99)
GLUCOSE SERPL-MCNC: 128 MG/DL (ref 70–99)
HCT VFR BLD AUTO: 39.5 % (ref 40–53)
HGB BLD-MCNC: 14.1 G/DL (ref 13.3–17.7)
INR PPP: 1.02 (ref 0.86–1.14)
KCT BLD-ACNC: 314 SEC (ref 75–150)
KCT BLD-ACNC: 339 SEC (ref 75–150)
KCT BLD-ACNC: 412 SEC (ref 75–150)
MCH RBC QN AUTO: 31.8 PG (ref 26.5–33)
MCHC RBC AUTO-ENTMCNC: 35.7 G/DL (ref 31.5–36.5)
MCV RBC AUTO: 89 FL (ref 78–100)
PLATELET # BLD AUTO: 137 10E9/L (ref 150–450)
POTASSIUM SERPL-SCNC: 3.7 MMOL/L (ref 3.4–5.3)
RBC # BLD AUTO: 4.44 10E12/L (ref 4.4–5.9)
SODIUM SERPL-SCNC: 142 MMOL/L (ref 133–144)
WBC # BLD AUTO: 8.3 10E9/L (ref 4–11)

## 2018-10-23 PROCEDURE — 40000936 ZZH STATISTIC OUTPATIENT (NON-OBS) NIGHT

## 2018-10-23 PROCEDURE — 27210787 ZZH MANIFOLD CR2

## 2018-10-23 PROCEDURE — C1887 CATHETER, GUIDING: HCPCS

## 2018-10-23 PROCEDURE — 93010 ELECTROCARDIOGRAM REPORT: CPT | Performed by: INTERNAL MEDICINE

## 2018-10-23 PROCEDURE — 80048 BASIC METABOLIC PNL TOTAL CA: CPT | Performed by: INTERNAL MEDICINE

## 2018-10-23 PROCEDURE — 27211089 ZZH KIT ACIST INJECTOR CR3

## 2018-10-23 PROCEDURE — 27210845 ZZH DEVICE INFLATION CR5

## 2018-10-23 PROCEDURE — 85730 THROMBOPLASTIN TIME PARTIAL: CPT | Performed by: INTERNAL MEDICINE

## 2018-10-23 PROCEDURE — 25000128 H RX IP 250 OP 636: Performed by: INTERNAL MEDICINE

## 2018-10-23 PROCEDURE — 27210946 ZZH KIT HC TOTES DISP CR8

## 2018-10-23 PROCEDURE — 40000852 ZZH STATISTIC HEART CATH LAB OR EP LAB

## 2018-10-23 PROCEDURE — C1769 GUIDE WIRE: HCPCS

## 2018-10-23 PROCEDURE — C9601 PERC DRUG-EL COR STENT BRAN: HCPCS

## 2018-10-23 PROCEDURE — 40000065 ZZH STATISTIC EKG NON-CHARGEABLE

## 2018-10-23 PROCEDURE — 25000132 ZZH RX MED GY IP 250 OP 250 PS 637: Performed by: INTERNAL MEDICINE

## 2018-10-23 PROCEDURE — 99152 MOD SED SAME PHYS/QHP 5/>YRS: CPT

## 2018-10-23 PROCEDURE — 85347 COAGULATION TIME ACTIVATED: CPT | Mod: 91

## 2018-10-23 PROCEDURE — 99152 MOD SED SAME PHYS/QHP 5/>YRS: CPT | Performed by: INTERNAL MEDICINE

## 2018-10-23 PROCEDURE — 25000125 ZZHC RX 250: Performed by: INTERNAL MEDICINE

## 2018-10-23 PROCEDURE — 85610 PROTHROMBIN TIME: CPT | Performed by: INTERNAL MEDICINE

## 2018-10-23 PROCEDURE — 40000935 ZZH STATISTIC OUTPATIENT (NON-OBS) EVE

## 2018-10-23 PROCEDURE — 27210914 ZZH SHEATH CR8

## 2018-10-23 PROCEDURE — 99153 MOD SED SAME PHYS/QHP EA: CPT

## 2018-10-23 PROCEDURE — 85027 COMPLETE CBC AUTOMATED: CPT | Performed by: INTERNAL MEDICINE

## 2018-10-23 PROCEDURE — 93005 ELECTROCARDIOGRAM TRACING: CPT

## 2018-10-23 PROCEDURE — 40000235 ZZH STATISTIC TELEMETRY

## 2018-10-23 PROCEDURE — 36415 COLL VENOUS BLD VENIPUNCTURE: CPT

## 2018-10-23 PROCEDURE — 92928 PRQ TCAT PLMT NTRAC ST 1 LES: CPT | Mod: LC | Performed by: INTERNAL MEDICINE

## 2018-10-23 PROCEDURE — C1725 CATH, TRANSLUMIN NON-LASER: HCPCS

## 2018-10-23 PROCEDURE — 93458 L HRT ARTERY/VENTRICLE ANGIO: CPT | Mod: XU

## 2018-10-23 PROCEDURE — C1874 STENT, COATED/COV W/DEL SYS: HCPCS

## 2018-10-23 PROCEDURE — C9600 PERC DRUG-EL COR STENT SING: HCPCS | Mod: LC

## 2018-10-23 PROCEDURE — 27210856 ZZH ACCESS HEART CATH CR2

## 2018-10-23 PROCEDURE — 82962 GLUCOSE BLOOD TEST: CPT

## 2018-10-23 PROCEDURE — 27210759 ZZH DEVICE INFLATION CR6

## 2018-10-23 PROCEDURE — 27210795 ZZH PAD DEFIB QUICK CR4

## 2018-10-23 RX ORDER — CALCIUM CARBONATE 500(1250)
1 TABLET ORAL DAILY
Status: DISCONTINUED | OUTPATIENT
Start: 2018-10-23 | End: 2018-10-24 | Stop reason: HOSPADM

## 2018-10-23 RX ORDER — ASPIRIN 81 MG/1
81-324 TABLET, CHEWABLE ORAL
Status: DISCONTINUED | OUTPATIENT
Start: 2018-10-23 | End: 2018-10-23 | Stop reason: HOSPADM

## 2018-10-23 RX ORDER — FENTANYL CITRATE 50 UG/ML
25-50 INJECTION, SOLUTION INTRAMUSCULAR; INTRAVENOUS
Status: ACTIVE | OUTPATIENT
Start: 2018-10-23 | End: 2018-10-23

## 2018-10-23 RX ORDER — NIFEDIPINE 10 MG/1
10 CAPSULE ORAL
Status: DISCONTINUED | OUTPATIENT
Start: 2018-10-23 | End: 2018-10-23 | Stop reason: HOSPADM

## 2018-10-23 RX ORDER — BUPIVACAINE HYDROCHLORIDE 2.5 MG/ML
1-10 INJECTION, SOLUTION EPIDURAL; INFILTRATION; INTRACAUDAL
Status: DISCONTINUED | OUTPATIENT
Start: 2018-10-23 | End: 2018-10-23 | Stop reason: HOSPADM

## 2018-10-23 RX ORDER — SODIUM NITROPRUSSIDE 25 MG/ML
100-200 INJECTION INTRAVENOUS
Status: DISCONTINUED | OUTPATIENT
Start: 2018-10-23 | End: 2018-10-23 | Stop reason: HOSPADM

## 2018-10-23 RX ORDER — HEPARIN SODIUM 1000 [USP'U]/ML
1000-10000 INJECTION, SOLUTION INTRAVENOUS; SUBCUTANEOUS EVERY 5 MIN PRN
Status: DISCONTINUED | OUTPATIENT
Start: 2018-10-23 | End: 2018-10-23 | Stop reason: HOSPADM

## 2018-10-23 RX ORDER — PRASUGREL 10 MG/1
10-60 TABLET, FILM COATED ORAL
Status: DISCONTINUED | OUTPATIENT
Start: 2018-10-23 | End: 2018-10-23 | Stop reason: HOSPADM

## 2018-10-23 RX ORDER — PROTAMINE SULFATE 10 MG/ML
25-100 INJECTION, SOLUTION INTRAVENOUS EVERY 5 MIN PRN
Status: DISCONTINUED | OUTPATIENT
Start: 2018-10-23 | End: 2018-10-23 | Stop reason: HOSPADM

## 2018-10-23 RX ORDER — FENTANYL CITRATE 50 UG/ML
25-50 INJECTION, SOLUTION INTRAMUSCULAR; INTRAVENOUS
Status: DISCONTINUED | OUTPATIENT
Start: 2018-10-23 | End: 2018-10-23 | Stop reason: HOSPADM

## 2018-10-23 RX ORDER — NALOXONE HYDROCHLORIDE 0.4 MG/ML
0.4 INJECTION, SOLUTION INTRAMUSCULAR; INTRAVENOUS; SUBCUTANEOUS EVERY 5 MIN PRN
Status: DISCONTINUED | OUTPATIENT
Start: 2018-10-23 | End: 2018-10-23 | Stop reason: HOSPADM

## 2018-10-23 RX ORDER — CLOPIDOGREL BISULFATE 75 MG/1
75 TABLET ORAL
Status: DISCONTINUED | OUTPATIENT
Start: 2018-10-23 | End: 2018-10-23 | Stop reason: HOSPADM

## 2018-10-23 RX ORDER — ACETAMINOPHEN 500 MG
1500 TABLET ORAL DAILY
Status: DISCONTINUED | OUTPATIENT
Start: 2018-10-23 | End: 2018-10-24 | Stop reason: HOSPADM

## 2018-10-23 RX ORDER — IOPAMIDOL 755 MG/ML
240 INJECTION, SOLUTION INTRAVASCULAR ONCE
Status: COMPLETED | OUTPATIENT
Start: 2018-10-23 | End: 2018-10-23

## 2018-10-23 RX ORDER — HYDROCODONE BITARTRATE AND ACETAMINOPHEN 5; 325 MG/1; MG/1
1-2 TABLET ORAL EVERY 4 HOURS PRN
Status: DISCONTINUED | OUTPATIENT
Start: 2018-10-23 | End: 2018-10-24 | Stop reason: HOSPADM

## 2018-10-23 RX ORDER — VERAPAMIL HYDROCHLORIDE 2.5 MG/ML
1-2.5 INJECTION, SOLUTION INTRAVENOUS
Status: COMPLETED | OUTPATIENT
Start: 2018-10-23 | End: 2018-10-23

## 2018-10-23 RX ORDER — ATORVASTATIN CALCIUM 40 MG/1
40 TABLET, FILM COATED ORAL DAILY
Status: DISCONTINUED | OUTPATIENT
Start: 2018-10-24 | End: 2018-10-24 | Stop reason: HOSPADM

## 2018-10-23 RX ORDER — NITROGLYCERIN 5 MG/ML
100-500 VIAL (ML) INTRAVENOUS
Status: COMPLETED | OUTPATIENT
Start: 2018-10-23 | End: 2018-10-23

## 2018-10-23 RX ORDER — METHYLPREDNISOLONE SODIUM SUCCINATE 125 MG/2ML
125 INJECTION, POWDER, LYOPHILIZED, FOR SOLUTION INTRAMUSCULAR; INTRAVENOUS
Status: DISCONTINUED | OUTPATIENT
Start: 2018-10-23 | End: 2018-10-23 | Stop reason: HOSPADM

## 2018-10-23 RX ORDER — DOPAMINE HYDROCHLORIDE 160 MG/100ML
2-20 INJECTION, SOLUTION INTRAVENOUS CONTINUOUS PRN
Status: DISCONTINUED | OUTPATIENT
Start: 2018-10-23 | End: 2018-10-23 | Stop reason: HOSPADM

## 2018-10-23 RX ORDER — CALCIUM CARBONATE 500(1250)
1 TABLET ORAL DAILY
COMMUNITY

## 2018-10-23 RX ORDER — ACETAMINOPHEN 325 MG/1
325-650 TABLET ORAL EVERY 4 HOURS PRN
Status: DISCONTINUED | OUTPATIENT
Start: 2018-10-23 | End: 2018-10-24 | Stop reason: HOSPADM

## 2018-10-23 RX ORDER — POTASSIUM CHLORIDE 7.45 MG/ML
10 INJECTION INTRAVENOUS
Status: DISCONTINUED | OUTPATIENT
Start: 2018-10-23 | End: 2018-10-23 | Stop reason: HOSPADM

## 2018-10-23 RX ORDER — ASPIRIN 81 MG/1
81 TABLET ORAL DAILY
Status: DISCONTINUED | OUTPATIENT
Start: 2018-10-23 | End: 2018-10-24 | Stop reason: HOSPADM

## 2018-10-23 RX ORDER — POTASSIUM CHLORIDE 29.8 MG/ML
20 INJECTION INTRAVENOUS
Status: DISCONTINUED | OUTPATIENT
Start: 2018-10-23 | End: 2018-10-23 | Stop reason: HOSPADM

## 2018-10-23 RX ORDER — HYDRALAZINE HYDROCHLORIDE 20 MG/ML
10-20 INJECTION INTRAMUSCULAR; INTRAVENOUS
Status: DISCONTINUED | OUTPATIENT
Start: 2018-10-23 | End: 2018-10-23 | Stop reason: HOSPADM

## 2018-10-23 RX ORDER — DIPHENHYDRAMINE HYDROCHLORIDE 50 MG/ML
25-50 INJECTION INTRAMUSCULAR; INTRAVENOUS
Status: DISCONTINUED | OUTPATIENT
Start: 2018-10-23 | End: 2018-10-23 | Stop reason: HOSPADM

## 2018-10-23 RX ORDER — NALOXONE HYDROCHLORIDE 0.4 MG/ML
.1-.4 INJECTION, SOLUTION INTRAMUSCULAR; INTRAVENOUS; SUBCUTANEOUS
Status: DISCONTINUED | OUTPATIENT
Start: 2018-10-23 | End: 2018-10-24 | Stop reason: HOSPADM

## 2018-10-23 RX ORDER — POTASSIUM CHLORIDE 1500 MG/1
20 TABLET, EXTENDED RELEASE ORAL
Status: COMPLETED | OUTPATIENT
Start: 2018-10-23 | End: 2018-10-23

## 2018-10-23 RX ORDER — NITROGLYCERIN 0.4 MG/1
0.4 TABLET SUBLINGUAL EVERY 5 MIN PRN
Status: DISCONTINUED | OUTPATIENT
Start: 2018-10-23 | End: 2018-10-23 | Stop reason: HOSPADM

## 2018-10-23 RX ORDER — NICOTINE 21 MG/24HR
1 PATCH, TRANSDERMAL 24 HOURS TRANSDERMAL EVERY 24 HOURS
Status: DISCONTINUED | OUTPATIENT
Start: 2018-10-23 | End: 2018-10-24 | Stop reason: HOSPADM

## 2018-10-23 RX ORDER — ASPIRIN 81 MG/1
81 TABLET ORAL DAILY
Status: DISCONTINUED | OUTPATIENT
Start: 2018-10-23 | End: 2018-10-23 | Stop reason: HOSPADM

## 2018-10-23 RX ORDER — METOPROLOL SUCCINATE 25 MG/1
25 TABLET, EXTENDED RELEASE ORAL DAILY
Status: DISCONTINUED | OUTPATIENT
Start: 2018-10-24 | End: 2018-10-24 | Stop reason: HOSPADM

## 2018-10-23 RX ORDER — PROTAMINE SULFATE 10 MG/ML
1-5 INJECTION, SOLUTION INTRAVENOUS
Status: DISCONTINUED | OUTPATIENT
Start: 2018-10-23 | End: 2018-10-23 | Stop reason: HOSPADM

## 2018-10-23 RX ORDER — MORPHINE SULFATE 2 MG/ML
1-2 INJECTION, SOLUTION INTRAMUSCULAR; INTRAVENOUS EVERY 5 MIN PRN
Status: DISCONTINUED | OUTPATIENT
Start: 2018-10-23 | End: 2018-10-23 | Stop reason: HOSPADM

## 2018-10-23 RX ORDER — CLOPIDOGREL 300 MG/1
300-600 TABLET, FILM COATED ORAL
Status: DISCONTINUED | OUTPATIENT
Start: 2018-10-23 | End: 2018-10-23 | Stop reason: HOSPADM

## 2018-10-23 RX ORDER — FLUMAZENIL 0.1 MG/ML
0.2 INJECTION, SOLUTION INTRAVENOUS
Status: ACTIVE | OUTPATIENT
Start: 2018-10-23 | End: 2018-10-23

## 2018-10-23 RX ORDER — SODIUM CHLORIDE 9 MG/ML
INJECTION, SOLUTION INTRAVENOUS CONTINUOUS
Status: ACTIVE | OUTPATIENT
Start: 2018-10-23 | End: 2018-10-23

## 2018-10-23 RX ORDER — NITROGLYCERIN 5 MG/ML
100-200 VIAL (ML) INTRAVENOUS
Status: DISCONTINUED | OUTPATIENT
Start: 2018-10-23 | End: 2018-10-23 | Stop reason: HOSPADM

## 2018-10-23 RX ORDER — FUROSEMIDE 10 MG/ML
20-100 INJECTION INTRAMUSCULAR; INTRAVENOUS
Status: DISCONTINUED | OUTPATIENT
Start: 2018-10-23 | End: 2018-10-23 | Stop reason: HOSPADM

## 2018-10-23 RX ORDER — NITROGLYCERIN 20 MG/100ML
.07-2 INJECTION INTRAVENOUS CONTINUOUS PRN
Status: DISCONTINUED | OUTPATIENT
Start: 2018-10-23 | End: 2018-10-23 | Stop reason: HOSPADM

## 2018-10-23 RX ORDER — TAMSULOSIN HYDROCHLORIDE 0.4 MG/1
0.4 CAPSULE ORAL DAILY
Status: DISCONTINUED | OUTPATIENT
Start: 2018-10-23 | End: 2018-10-24 | Stop reason: HOSPADM

## 2018-10-23 RX ORDER — ENALAPRILAT 1.25 MG/ML
1.25-2.5 INJECTION INTRAVENOUS
Status: DISCONTINUED | OUTPATIENT
Start: 2018-10-23 | End: 2018-10-23 | Stop reason: HOSPADM

## 2018-10-23 RX ORDER — PHENYLEPHRINE HCL IN 0.9% NACL 1 MG/10 ML
20-100 SYRINGE (ML) INTRAVENOUS
Status: DISCONTINUED | OUTPATIENT
Start: 2018-10-23 | End: 2018-10-23 | Stop reason: HOSPADM

## 2018-10-23 RX ORDER — LIDOCAINE 40 MG/G
CREAM TOPICAL
Status: DISCONTINUED | OUTPATIENT
Start: 2018-10-23 | End: 2018-10-24 | Stop reason: HOSPADM

## 2018-10-23 RX ORDER — FLUMAZENIL 0.1 MG/ML
0.2 INJECTION, SOLUTION INTRAVENOUS
Status: DISCONTINUED | OUTPATIENT
Start: 2018-10-23 | End: 2018-10-23 | Stop reason: HOSPADM

## 2018-10-23 RX ORDER — SODIUM CHLORIDE 9 MG/ML
INJECTION, SOLUTION INTRAVENOUS CONTINUOUS
Status: DISCONTINUED | OUTPATIENT
Start: 2018-10-23 | End: 2018-10-23 | Stop reason: HOSPADM

## 2018-10-23 RX ORDER — NITROGLYCERIN 0.4 MG/1
0.4 TABLET SUBLINGUAL EVERY 5 MIN PRN
Status: DISCONTINUED | OUTPATIENT
Start: 2018-10-23 | End: 2018-10-24 | Stop reason: HOSPADM

## 2018-10-23 RX ORDER — LIDOCAINE HYDROCHLORIDE 10 MG/ML
30 INJECTION, SOLUTION EPIDURAL; INFILTRATION; INTRACAUDAL; PERINEURAL
Status: DISCONTINUED | OUTPATIENT
Start: 2018-10-23 | End: 2018-10-23 | Stop reason: HOSPADM

## 2018-10-23 RX ORDER — ATROPINE SULFATE 0.1 MG/ML
0.5 INJECTION INTRAVENOUS EVERY 5 MIN PRN
Status: ACTIVE | OUTPATIENT
Start: 2018-10-23 | End: 2018-10-23

## 2018-10-23 RX ORDER — ATROPINE SULFATE 0.1 MG/ML
.5-1 INJECTION INTRAVENOUS
Status: DISCONTINUED | OUTPATIENT
Start: 2018-10-23 | End: 2018-10-23 | Stop reason: HOSPADM

## 2018-10-23 RX ORDER — DOBUTAMINE HYDROCHLORIDE 200 MG/100ML
2-20 INJECTION INTRAVENOUS CONTINUOUS PRN
Status: DISCONTINUED | OUTPATIENT
Start: 2018-10-23 | End: 2018-10-23 | Stop reason: HOSPADM

## 2018-10-23 RX ORDER — NALOXONE HYDROCHLORIDE 0.4 MG/ML
.2-.4 INJECTION, SOLUTION INTRAMUSCULAR; INTRAVENOUS; SUBCUTANEOUS
Status: ACTIVE | OUTPATIENT
Start: 2018-10-23 | End: 2018-10-23

## 2018-10-23 RX ORDER — DEXTROSE MONOHYDRATE 25 G/50ML
12.5-5 INJECTION, SOLUTION INTRAVENOUS EVERY 30 MIN PRN
Status: DISCONTINUED | OUTPATIENT
Start: 2018-10-23 | End: 2018-10-23 | Stop reason: HOSPADM

## 2018-10-23 RX ORDER — METOPROLOL TARTRATE 1 MG/ML
5 INJECTION, SOLUTION INTRAVENOUS EVERY 5 MIN PRN
Status: DISCONTINUED | OUTPATIENT
Start: 2018-10-23 | End: 2018-10-23 | Stop reason: HOSPADM

## 2018-10-23 RX ORDER — LIDOCAINE 40 MG/G
CREAM TOPICAL
Status: DISCONTINUED | OUTPATIENT
Start: 2018-10-23 | End: 2018-10-23 | Stop reason: HOSPADM

## 2018-10-23 RX ORDER — EPINEPHRINE 1 MG/ML
0.3 INJECTION, SOLUTION, CONCENTRATE INTRAVENOUS
Status: DISCONTINUED | OUTPATIENT
Start: 2018-10-23 | End: 2018-10-23 | Stop reason: HOSPADM

## 2018-10-23 RX ORDER — ADENOSINE 3 MG/ML
12-12000 INJECTION, SOLUTION INTRAVENOUS
Status: DISCONTINUED | OUTPATIENT
Start: 2018-10-23 | End: 2018-10-23 | Stop reason: HOSPADM

## 2018-10-23 RX ORDER — NICARDIPINE HYDROCHLORIDE 2.5 MG/ML
100 INJECTION INTRAVENOUS
Status: DISCONTINUED | OUTPATIENT
Start: 2018-10-23 | End: 2018-10-23 | Stop reason: HOSPADM

## 2018-10-23 RX ORDER — ASPIRIN 325 MG
325 TABLET ORAL
Status: DISCONTINUED | OUTPATIENT
Start: 2018-10-23 | End: 2018-10-23 | Stop reason: HOSPADM

## 2018-10-23 RX ORDER — LIDOCAINE HYDROCHLORIDE 10 MG/ML
1-10 INJECTION, SOLUTION EPIDURAL; INFILTRATION; INTRACAUDAL; PERINEURAL
Status: COMPLETED | OUTPATIENT
Start: 2018-10-23 | End: 2018-10-23

## 2018-10-23 RX ORDER — LISINOPRIL 10 MG/1
10 TABLET ORAL DAILY
Status: DISCONTINUED | OUTPATIENT
Start: 2018-10-24 | End: 2018-10-24 | Stop reason: HOSPADM

## 2018-10-23 RX ORDER — LORAZEPAM 2 MG/ML
.5-2 INJECTION INTRAMUSCULAR EVERY 4 HOURS PRN
Status: DISCONTINUED | OUTPATIENT
Start: 2018-10-23 | End: 2018-10-23 | Stop reason: HOSPADM

## 2018-10-23 RX ORDER — ONDANSETRON 2 MG/ML
4 INJECTION INTRAMUSCULAR; INTRAVENOUS EVERY 4 HOURS PRN
Status: DISCONTINUED | OUTPATIENT
Start: 2018-10-23 | End: 2018-10-23 | Stop reason: HOSPADM

## 2018-10-23 RX ORDER — ASPIRIN 81 MG/1
81 TABLET ORAL DAILY
Status: DISCONTINUED | OUTPATIENT
Start: 2018-10-23 | End: 2018-10-23

## 2018-10-23 RX ADMIN — MIDAZOLAM 1 MG: 1 INJECTION INTRAMUSCULAR; INTRAVENOUS at 09:55

## 2018-10-23 RX ADMIN — MELATONIN 5 MG TABLET 5 MG: at 21:49

## 2018-10-23 RX ADMIN — POTASSIUM CHLORIDE 20 MEQ: 1500 TABLET, EXTENDED RELEASE ORAL at 08:07

## 2018-10-23 RX ADMIN — NITROGLYCERIN 200 MCG: 5 INJECTION, SOLUTION INTRAVENOUS at 09:29

## 2018-10-23 RX ADMIN — SODIUM CHLORIDE 500 ML: 9 INJECTION, SOLUTION INTRAVENOUS at 09:55

## 2018-10-23 RX ADMIN — SODIUM CHLORIDE: 9 INJECTION, SOLUTION INTRAVENOUS at 07:10

## 2018-10-23 RX ADMIN — TICAGRELOR 90 MG: 90 TABLET ORAL at 20:10

## 2018-10-23 RX ADMIN — MIDAZOLAM 1 MG: 1 INJECTION INTRAMUSCULAR; INTRAVENOUS at 09:25

## 2018-10-23 RX ADMIN — FENTANYL CITRATE 50 MCG: 50 INJECTION, SOLUTION INTRAMUSCULAR; INTRAVENOUS at 09:25

## 2018-10-23 RX ADMIN — VERAPAMIL HYDROCHLORIDE 2.5 MG: 2.5 INJECTION, SOLUTION INTRAVENOUS at 09:29

## 2018-10-23 RX ADMIN — FENTANYL CITRATE 50 MCG: 50 INJECTION, SOLUTION INTRAMUSCULAR; INTRAVENOUS at 09:21

## 2018-10-23 RX ADMIN — LIDOCAINE HYDROCHLORIDE 1 ML: 10 INJECTION, SOLUTION EPIDURAL; INFILTRATION; INTRACAUDAL; PERINEURAL at 09:25

## 2018-10-23 RX ADMIN — SODIUM NITROPRUSSIDE 200 MCG: 25 INJECTION INTRAVENOUS at 09:54

## 2018-10-23 RX ADMIN — IOPAMIDOL 240 ML: 755 INJECTION, SOLUTION INTRAVASCULAR at 11:15

## 2018-10-23 RX ADMIN — MIDAZOLAM 1 MG: 1 INJECTION INTRAMUSCULAR; INTRAVENOUS at 10:44

## 2018-10-23 RX ADMIN — MIDAZOLAM 1 MG: 1 INJECTION INTRAMUSCULAR; INTRAVENOUS at 09:21

## 2018-10-23 RX ADMIN — HEPARIN SODIUM 6000 UNITS: 1000 INJECTION, SOLUTION INTRAVENOUS; SUBCUTANEOUS at 09:29

## 2018-10-23 NOTE — PLAN OF CARE
Pt returned from procedure via bed, alert. VSS, HR 57. L radial site intact with TR band, +2 radial pulse, fingers warm. Tolerating sips of water. Plan to stay in observation unit overnight.

## 2018-10-23 NOTE — PLAN OF CARE
Pt denies chest pain, VSS, Tele SR/SB. TR band removed per protocol, site soft, dry, covered with adhesive bandage. +2 radial pulse, pt denies numbness or tingling. Tolerating PO, ambulated in hallway and voided x2. Plan to transfer to observation around 1600, report given to Snehal LUCAS RN.

## 2018-10-23 NOTE — IP AVS SNAPSHOT
MRN:[id number]                      After Visit Summary   10/23/2018    Rk Longoria    MRN: [id number]           Thank you!     Thank you for choosing [facility] for your care. Our goal is always to provide you with excellent care. Hearing back from our patients is one way we can continue to improve our services. Please take a few minutes to complete the written survey that you may receive in the mail after you visit with us. Thank you!        Patient Information     Date Of Birth          1955        About your hospital stay     You were admitted on:  October 23, 2018 You last received care in the:  [facility] Observation Unit    You were discharged on:  October 24, 2018        Reason for your hospital stay       Observation post heart stenting                  Who to Call     For medical emergencies, please call 911.  For non-urgent questions about your medical care, please call your primary care provider or clinic, [phone]          Attending Provider     Provider Specialty    Norberto Koenig MD Cardiology    [facility], Travis DURANT MD Cardiology       Primary Care Provider Office Phone # Fax #    Darrin Dave Mendez [phone]      After Care Instructions     Activity       Your activity upon discharge: activity as tolerated            Discharge Instructions - IF on Metformin (Glucophage or Glucovance) or Metformin containing medications       IF on Metformin (Glucophage or Glucovance) or Metformin containing medications , schedule a Basic Metabolic Panel at [facility] or Primary Clinic in 48 - 72 hours post procedure and PRIOR TO resuming the Metformin or Metformin containing medications.  Hold Metformin (Glucophage or Glucovance) or Metformin containing medications until after the Basic Metabolic Panel on the 2nd or 3rd day following the procedure.  May resume after blood draw is complete.                  Follow-up Appointments     Follow-up and recommended  labs and tests        BMP 10/25 1:15 pm  Anjelica Doyle PA-C 10/29 at 1:15 pm                  Your next 10 appointments already scheduled     Oct 25, 2018  1:15 PM CDT   LAB with RU LAB   [facility] ([facility])    [id number] [place] [id number]   [phone]           Please do not eat 10-12 hours before your appointment if you are coming in fasting for labs on lipids, cholesterol, or glucose (sugar). This does not apply to pregnant women. Water, hot tea and black coffee (with nothing added) are okay. Do not drink other fluids, diet soda or chew gum.            Oct 29, 2018  1:10 PM CDT   Return Visit with Anjelica Doyle PA-C   [facility] ([facility])    [address] [id number] [phone]              Additional Services     CARDIAC REHAB REFERRAL       Please be aware that coverage of these services is subject to the terms and limitations of your health insurance plan.  Call member services at your health plan with any benefit or coverage questions.     Order is sent electronically to central rehab scheduling. Call [phone] if you haven't been contacted regarding these appointments within 2 business days of discharge.                  Further instructions from your care team       Cardiac Angioplasty/Stent Discharge Instructions - Radial    After you go home:      Have an adult stay with you until tomorrow.    Drink extra fluids for 2 days.    You may resume your normal diet.    No smoking       For 24 hours - due to the sedation you received:    Relax and take it easy.    Do NOT make any important or legal decisions.    Do NOT drive or operate machines at home or at work.    Do NOT drink alcohol.    Care of Wrist Puncture Site:      For the first 24 hrs - check the puncture site every 1-2 hours while awake.    It is normal to have  soreness at the puncture site and mild tingling in your hand for up to 3 days.    Remove the bandaid after 24 hours. If there is minor oozing, apply another bandaid and remove it after 12 hours.    You may shower tomorrow.  Do NOT take a bath, or use a hot tub or pool for at least 3 days. Do NOT scrub the site. Do not use lotion or powder near the puncture site.           Activity:          For 2 days:     do not use your hand or arm to support your weight (such as rising from a chair)     do not bend your wrist (such as lifting a garage door).    do not lift more than 5 pounds or exercise your arm (such as tennis, golf or bowling).    Do NOT do any heavy activity such as exercise, lifting, or straining.     Bleeding:      If you start bleeding from the site in your wrist, sit down and press firmly on/above the site for 10 minutes.     Once bleeding stops, keep arm still for 2 hours.     Call [facility] as soon as you can.       Call 911 right away if you have heavy bleeding or bleeding that does not stop.      Medicines:      If you are taking an antiplatelet medication such as Plavix, Brilinta or Effient, do not stop taking it until you talk to your cardiologist.        If you are on Metformin (Glucophage), do not restart it until you have blood tests (within 2 to 3 days after discharge).  After you have your blood drawn, you may restart the Metformin.     If you have stopped any medicines, check with your provider about when to restart them.    Follow Up Appointments:      Follow up with [facility] Nurse Practitioner at [facility] of patient preference in 7-10 days.    Cardiac Rehab will contact you for follow up care.    Call the clinic if:      You have a large or growing hard lump around the site.    The site is red, swollen, hot or tender.    Blood or fluid is draining from the site.    You have chills or a fever greater than 101 F (38 C).    Your arm feels numb, cool or changes color.    You have hives,  "a rash or unusual itching.    Any questions or concerns.    Other Instructions:      Carry your stent card with you at all times.      [facility] [place]:    [phone] [facility] (7 days a week)          Pending Results     Date and Time Order Name Status Description    10/23/2018 1113 EKG 12-lead, tracing only  Preliminary     10/23/2018 0654 EKG 12-lead, tracing only Preliminary             Statement of Approval     Ordered          10/24/18 0849  I have reviewed and agree with all the recommendations and orders detailed in this document.  EFFECTIVE NOW     Approved and electronically signed by:  Snehal Rodriguez, SHANNON CNP             Admission Information     Date & Time Provider Department Dept. Phone    10/23/2018 Travis Donald MD [facility] Observation Unit [phone]      Your Vitals Were     Blood Pressure Pulse Temperature Respirations Height Weight    115/72 74 98  F (36.7  C) (Oral) 14 1.803 m (5' 11\") 85.4 kg (188 lb 4.8 oz)    Pulse Oximetry BMI (Body Mass Index)                98% 26.26 kg/m2          import.io Information     [email or web address] lets you send messages to your doctor, view your test results, renew your prescriptions, schedule appointments and more. To sign up, go to [email or web address] . Click on \"Log in\" on the left side of the screen, which will take you to the Welcome page. Then click on \"Sign up Now\" on the right side of the page.     You will be asked to enter the access code listed below, as well as some personal information. Please follow the directions to create your username and password.     Your access code is: [email or web address]  Expires: 2018 11:04 AM     Your access code will  in 90 days. If you need help or a new code, please call your [facility] [place] clinic or [phone].        Care EveryWhere ID     This is your Care EveryWhere ID. This could be used by other organizations to access your [facility] medical records  [id number]        Equal " Access to Services     [facility]: Hadii aad ku hadenriquejoni Granados, waaxda luqadaha, qaybta kaalmawilfrid veronica. So [facility] [phone].    ATENCIÓN: Si habla español, tiene a shipley disposición servicios gratuitos de asistencia lingüística. Llame al [phone].    We comply with applicable federal civil rights laws and [place] laws. We do not discriminate on the basis of race, color, national origin, age, disability, sex, sexual orientation, or gender identity.               Review of your medicines      CONTINUE these medicines which have NOT CHANGED        Dose / Directions    acetaminophen 500 MG tablet   Commonly known as:  TYLENOL        Dose:  1500 mg   Take 1,500 mg by mouth daily Arthritis pain   Refills:  0       aspirin 81 MG EC tablet   Used for:  ST elevation myocardial infarction involving right coronary artery (H)        Dose:  81 mg   Take 1 tablet (81 mg) by mouth daily   Quantity:  90 tablet   Refills:  3       atorvastatin 40 MG tablet   Commonly known as:  LIPITOR   Used for:  ST elevation myocardial infarction involving left anterior descending (LAD) coronary artery (H)        Dose:  40 mg   Take 1 tablet (40 mg) by mouth daily   Quantity:  90 tablet   Refills:  3       blood glucose lancets standard   Commonly known as:  no brand specified   Used for:  Type 2 diabetes mellitus with hyperglycemia, without long-term current use of insulin (H)        Use to test blood sugar 1 times daily or as directed.   Quantity:  100 each   Refills:  11       blood glucose monitoring meter device kit   Commonly known as:  no brand specified   Used for:  Type 2 diabetes mellitus with hyperglycemia, without long-term current use of insulin (H)        Use to test blood sugar 1 times daily or as directed.   Quantity:  1 kit   Refills:  0       blood glucose monitoring test strip   Commonly known as:  no brand specified   Used for:  Type 2 diabetes mellitus with  hyperglycemia, without long-term current use of insulin (H)        Use to test blood sugars 1 times daily or as directed   Quantity:  100 strip   Refills:  0       calcium carbonate 500 mg (elemental) 500 MG tablet   Commonly known as:  OS-HANNAH        Dose:  1 tablet   Take 1 tablet by mouth daily   Refills:  0       chlorthalidone 25 MG tablet   Commonly known as:  HYGROTON        Dose:  12.5 mg   Take 0.5 tablets (12.5 mg) by mouth daily   Quantity:  45 tablet   Refills:  3       lisinopril 10 MG tablet   Commonly known as:  PRINIVIL/ZESTRIL   Used for:  ST elevation myocardial infarction involving left anterior descending (LAD) coronary artery (H)        Dose:  10 mg   Take 1 tablet (10 mg) by mouth daily   Quantity:  90 tablet   Refills:  3       metFORMIN 500 MG tablet   Commonly known as:  GLUCOPHAGE   Used for:  Type 2 diabetes mellitus with hyperglycemia, without long-term current use of insulin (H)        Dose:  500 mg   Take 1 tablet (500 mg) by mouth 2 times daily (with meals)   Quantity:  360 tablet   Refills:  1       metoprolol succinate 25 MG 24 hr tablet   Commonly known as:  TOPROL-XL   Used for:  ST elevation myocardial infarction involving left anterior descending (LAD) coronary artery (H), Tobacco use disorder        Dose:  25 mg   Take 1 tablet (25 mg) by mouth daily   Quantity:  90 tablet   Refills:  3       * nicotine 7 MG/24HR 24 hr patch   Commonly known as:  NICODERM CQ   Used for:  ST elevation myocardial infarction involving left anterior descending (LAD) coronary artery (H), Tobacco use disorder, ST elevation myocardial infarction involving right coronary artery (H)        Dose:  1 patch   Place 1 patch onto the skin daily   Quantity:  30 patch   Refills:  3       * nicotine 14 MG/24HR 24 hr patch   Commonly known as:  NICODERM CQ   Used for:  Tobacco use disorder        Dose:  1 patch   Place 1 patch onto the skin every 24 hours   Quantity:  30 patch   Refills:  1       nitroGLYcerin 0.4  MG/SPRAY spray   Commonly known as:  NITROLINGUAL   Used for:  ST elevation myocardial infarction involving right coronary artery (H)        For chest pain spray 1 spray under tongue every 5 minutes for 3 doses. If symptoms persist 5 minutes after 1st dose call 911.   Quantity:  4.9 g   Refills:  1       sildenafil 100 MG tablet   Commonly known as:  VIAGRA   Used for:  Corporo-venous occlusive erectile dysfunction        Dose:   mg   Take 0.5-1 tablets ( mg) by mouth daily as needed   Quantity:  12 tablet   Refills:  1       tamsulosin 0.4 MG capsule   Commonly known as:  FLOMAX   Used for:  Benign prostatic hyperplasia with weak urinary stream        Dose:  0.4 mg   Take 1 capsule (0.4 mg) by mouth daily   Quantity:  30 capsule   Refills:  1       ticagrelor 90 MG tablet   Commonly known as:  BRILINTA   Used for:  ST elevation myocardial infarction involving left anterior descending (LAD) coronary artery (H)        Dose:  90 mg   Take 1 tablet (90 mg) by mouth 2 times daily   Quantity:  90 tablet   Refills:  3       Vitamin D (Cholecalciferol) 1000 units Tabs        Dose:  2000 Units   Take 2,000 Units by mouth daily   Refills:  0       * Notice:  This list has 2 medication(s) that are the same as other medications prescribed for you. Read the directions carefully, and ask your doctor or other care provider to review them with you.             Protect others around you: Learn how to safely use, store and throw away your medicines at www.disposemymeds.org.             Medication List: This is a list of all your medications and when to take them. Check marks below indicate your daily home schedule. Keep this list as a reference.      Medications           Morning Afternoon Evening Bedtime As Needed    acetaminophen 500 MG tablet   Commonly known as:  TYLENOL   Take 1,500 mg by mouth daily Arthritis pain   Last time this was given:  1,500 mg on 10/24/2018  8:28 AM                                   aspirin  81 MG EC tablet   Take 1 tablet (81 mg) by mouth daily   Last time this was given:  81 mg on 10/24/2018  8:29 AM                                   atorvastatin 40 MG tablet   Commonly known as:  LIPITOR   Take 1 tablet (40 mg) by mouth daily   Last time this was given:  40 mg on 10/24/2018  8:27 AM                                   blood glucose lancets standard   Commonly known as:  no brand specified   Use to test blood sugar 1 times daily or as directed.                                blood glucose monitoring meter device kit   Commonly known as:  no brand specified   Use to test blood sugar 1 times daily or as directed.                                blood glucose monitoring test strip   Commonly known as:  no brand specified   Use to test blood sugars 1 times daily or as directed                                calcium carbonate 500 mg (elemental) 500 MG tablet   Commonly known as:  OS-HANNAH   Take 1 tablet by mouth daily                                chlorthalidone 25 MG tablet   Commonly known as:  HYGROTON   Take 0.5 tablets (12.5 mg) by mouth daily   Last time this was given:  12.5 mg on 10/24/2018  9:23 AM                                   lisinopril 10 MG tablet   Commonly known as:  PRINIVIL/ZESTRIL   Take 1 tablet (10 mg) by mouth daily   Last time this was given:  10 mg on 10/24/2018  8:28 AM                                   metFORMIN 500 MG tablet   Commonly known as:  GLUCOPHAGE   Take 1 tablet (500 mg) by mouth 2 times daily (with meals)                                      metoprolol succinate 25 MG 24 hr tablet   Commonly known as:  TOPROL-XL   Take 1 tablet (25 mg) by mouth daily   Last time this was given:  25 mg on 10/24/2018  8:26 AM                                   * nicotine 7 MG/24HR 24 hr patch   Commonly known as:  NICODERM CQ   Place 1 patch onto the skin daily   Last time this was given:  1 patch on 10/24/2018  8:25 AM                                   * nicotine 14 MG/24HR 24 hr patch    Commonly known as:  NICODERM CQ   Place 1 patch onto the skin every 24 hours   Last time this was given:  1 patch on 10/24/2018  8:05 AM                                nitroGLYcerin 0.4 MG/SPRAY spray   Commonly known as:  NITROLINGUAL   For chest pain spray 1 spray under tongue every 5 minutes for 3 doses. If symptoms persist 5 minutes after 1st dose call 911.                                   sildenafil 100 MG tablet   Commonly known as:  VIAGRA   Take 0.5-1 tablets ( mg) by mouth daily as needed                                   tamsulosin 0.4 MG capsule   Commonly known as:  FLOMAX   Take 1 capsule (0.4 mg) by mouth daily   Last time this was given:  0.4 mg on 10/24/2018  8:28 AM                                   ticagrelor 90 MG tablet   Commonly known as:  BRILINTA   Take 1 tablet (90 mg) by mouth 2 times daily   Last time this was given:  90 mg on 10/24/2018  8:29 AM                                      Vitamin D (Cholecalciferol) 1000 units Tabs   Take 2,000 Units by mouth daily                                   * Notice:  This list has 2 medication(s) that are the same as other medications prescribed for you. Read the directions carefully, and ask your doctor or other care provider to review them with you.

## 2018-10-23 NOTE — PROGRESS NOTES
1607 VSS. Denies pain. Left radial site is soft, CDI, no bleed or hematoma.  See flow sheets. Pt resting. Per Arlene ortiz pt has eaten, has been up to void. Called report to latrice ortiz obs care. Await ready bed. Will transfer pt via wheel chair up to obs room 20.  1625 bed in obs not ready yet. Report to arlene armenta rn. Site good, no pain. See flow sheets.

## 2018-10-23 NOTE — IP AVS SNAPSHOT
[facility]    [address] [place] [id number]    Phone:  [phone]                                       After Visit Summary   10/23/2018    Rk Longoria    MRN: [id number]           After Visit Summary Signature Page     I have received my discharge instructions, and my questions have been answered. I have discussed any challenges I see with this plan with the nurse or doctor.    ..........................................................................................................................................  Patient/Patient Representative Signature      ..........................................................................................................................................  Patient Representative Print Name and Relationship to Patient    ..................................................               ................................................  Date                                   Time    ..........................................................................................................................................  Reviewed by Signature/Title    ...................................................              ..............................................  Date                                               Time          22EPIC Rev 08/18

## 2018-10-23 NOTE — DISCHARGE INSTRUCTIONS
Cardiac Angioplasty/Stent Discharge Instructions - Radial    After you go home:      Have an adult stay with you until tomorrow.    Drink extra fluids for 2 days.    You may resume your normal diet.    No smoking       For 24 hours - due to the sedation you received:    Relax and take it easy.    Do NOT make any important or legal decisions.    Do NOT drive or operate machines at home or at work.    Do NOT drink alcohol.    Care of Wrist Puncture Site:      For the first 24 hrs - check the puncture site every 1-2 hours while awake.    It is normal to have soreness at the puncture site and mild tingling in your hand for up to 3 days.    Remove the bandaid after 24 hours. If there is minor oozing, apply another bandaid and remove it after 12 hours.    You may shower tomorrow.  Do NOT take a bath, or use a hot tub or pool for at least 3 days. Do NOT scrub the site. Do not use lotion or powder near the puncture site.           Activity:          For 2 days:     do not use your hand or arm to support your weight (such as rising from a chair)     do not bend your wrist (such as lifting a garage door).    do not lift more than 5 pounds or exercise your arm (such as tennis, golf or bowling).    Do NOT do any heavy activity such as exercise, lifting, or straining.     Bleeding:      If you start bleeding from the site in your wrist, sit down and press firmly on/above the site for 10 minutes.     Once bleeding stops, keep arm still for 2 hours.     Call [facility] as soon as you can.       Call 911 right away if you have heavy bleeding or bleeding that does not stop.      Medicines:      If you are taking an antiplatelet medication such as Plavix, Brilinta or Effient, do not stop taking it until you talk to your cardiologist.        If you are on Metformin (Glucophage), do not restart it until you have blood tests (within 2 to 3 days after discharge).  After you have your blood drawn, you may restart the Metformin.     If you  have stopped any medicines, check with your provider about when to restart them.    Follow Up Appointments:      Follow up with [facility] Nurse Practitioner at [facility] of patient preference in 7-10 days.    Cardiac Rehab will contact you for follow up care.    Call the clinic if:      You have a large or growing hard lump around the site.    The site is red, swollen, hot or tender.    Blood or fluid is draining from the site.    You have chills or a fever greater than 101 F (38 C).    Your arm feels numb, cool or changes color.    You have hives, a rash or unusual itching.    Any questions or concerns.    Other Instructions:      Carry your stent card with you at all times.      [facility] [place]:    [phone] [facility] (7 days a week)

## 2018-10-23 NOTE — PLAN OF CARE
Pt here for staged PCI, dropped off by spouse Lea [phone], who will return to  pt. VSS, HR estela 55-58, asymptomatic, pt denies pain. Anxious about upcoming procedure. Labs drawn, IVF infusing. Radial and pedal pulses palpable, LS clear. Contrast discharge instructions reviewed, awaiting MD to consent pt.

## 2018-10-24 ENCOUNTER — TELEPHONE (OUTPATIENT)
Dept: CARDIOLOGY | Facility: CLINIC | Age: 63
End: 2018-10-24

## 2018-10-24 VITALS
BODY MASS INDEX: 26.36 KG/M2 | DIASTOLIC BLOOD PRESSURE: 72 MMHG | SYSTOLIC BLOOD PRESSURE: 115 MMHG | TEMPERATURE: 98 F | WEIGHT: 188.3 LBS | HEIGHT: 71 IN | OXYGEN SATURATION: 98 % | RESPIRATION RATE: 14 BRPM | HEART RATE: 74 BPM

## 2018-10-24 DIAGNOSIS — Z53.9 ERRONEOUS ENCOUNTER--DISREGARD: Primary | ICD-10-CM

## 2018-10-24 LAB
GLUCOSE BLDC GLUCOMTR-MCNC: 101 MG/DL (ref 70–99)
GLUCOSE BLDC GLUCOMTR-MCNC: 151 MG/DL (ref 70–99)

## 2018-10-24 PROCEDURE — 40000934 ZZH STATISTIC OUTPATIENT (NON-OBS) DAY

## 2018-10-24 PROCEDURE — 25000132 ZZH RX MED GY IP 250 OP 250 PS 637: Performed by: INTERNAL MEDICINE

## 2018-10-24 PROCEDURE — 82962 GLUCOSE BLOOD TEST: CPT

## 2018-10-24 RX ADMIN — ASPIRIN 81 MG: 81 TABLET, COATED ORAL at 08:29

## 2018-10-24 RX ADMIN — TAMSULOSIN HYDROCHLORIDE 0.4 MG: 0.4 CAPSULE ORAL at 08:28

## 2018-10-24 RX ADMIN — ATORVASTATIN CALCIUM 40 MG: 40 TABLET, FILM COATED ORAL at 08:27

## 2018-10-24 RX ADMIN — CHLORTHALIDONE 12.5 MG: 25 TABLET ORAL at 09:23

## 2018-10-24 RX ADMIN — METOPROLOL SUCCINATE 25 MG: 25 TABLET, EXTENDED RELEASE ORAL at 08:26

## 2018-10-24 RX ADMIN — LISINOPRIL 10 MG: 10 TABLET ORAL at 08:28

## 2018-10-24 RX ADMIN — TICAGRELOR 90 MG: 90 TABLET ORAL at 08:29

## 2018-10-24 RX ADMIN — ACETAMINOPHEN 1500 MG: 500 TABLET, FILM COATED ORAL at 08:28

## 2018-10-24 RX ADMIN — NICOTINE 1 PATCH: 7 PATCH, EXTENDED RELEASE TRANSDERMAL at 08:25

## 2018-10-24 NOTE — PROGRESS NOTES
MD Notification    Notified Person: MD    Notified Person Name: Dr. Skinner    Notification Date/Time: 10/23/18 at 2130    Notification Interaction: Phone     Purpose of Notification: Pt requested sleep aid Melatonin    Orders Received: Ok to give Melatonin 5 mg PRN at bedtime

## 2018-10-24 NOTE — PROGRESS NOTES
[facility]  Cardiology Progress Note  Date of Service: 10/24/2018    Assessment & Plan    Rk Longoria is a 63 year old male with past medical history significant for inferior STEMI s/p PAMELA to proximal to mid RCA, type 2 diabetes, hypertension and tobacco abuse. He was admitted on 10/23/2018 post a staged intervention of the proximal to mid circumflex and OM1.     Assessment and Plan:     1. Coronary artery disease:  Status post inferior STEMI with 95% thrombotic lesion and PAMELA x 1 to the proximal to distal RCA. He had residual 80% left circumflex in the bifurcation of the OM and 80% lesion in the ostial portion of the OM1 and mid circumflex 80%-90% lesion.     DAPT with aspirin 81 mg daily and ticagrelor 90 mg BID    Continue atorvastatin 40 mg daily    Discharge today    Follow up Anjelica Doyle 10/29/18 at 1:15 pm at [facility]    2. Diabetes: Metformin 500 mg BID    BMP on 10/25/18 to restart Metformin    3. Hypertension: Atorvastatin 40 mg daily, chlorthalidone 12.5 mg daily, lisinopril 10 mg daily, metoprolol XL 25 mg     4. Tobacco abuse: Quit smoking post STEMI     5. Ascending aortic dilation: Noted on coronary angiogram. Echocardiogram form 9/2018 measured at 4.0 cm    SHANNON Zimmerman, CNP  [facility]  Pager: [phone]     Interval History   No chest discomfort overnight.    Physical Exam   Temp: 98  F (36.7  C) Temp src: Oral BP: 115/72 Pulse: 74   Resp: 14 SpO2: 98 % O2 Device: None (Room air)    Vitals:    10/23/18 0643   Weight: 85.4 kg (188 lb 4.8 oz)       Constitutional:   NAD   Skin:   Warm and dry   Head:   Nontraumatic   Neck:   no JVD   Lungs:   symmetric, clear to auscultation   Cardiovascular:   regular rate and rhythm, normal S1 and S2 and no murmur noted   Abdomen:   Benign   Extremities and Back:   Left radial site CDI without bruit or hematoma. CMS intact   Neurological:   Grossly nonfocal       Medications     - MEDICATION INSTRUCTIONS -       -  MEDICATION INSTRUCTIONS -       - MEDICATION INSTRUCTIONS -       - MEDICATION INSTRUCTIONS -       Percutaneous Coronary Intervention orders placed (this is information for BPA alerting)         acetaminophen  1,500 mg Oral Daily     aspirin  81 mg Oral Daily     atorvastatin  40 mg Oral Daily     calcium carbonate 500 mg (elemental)  1 tablet Oral Daily     chlorthalidone  12.5 mg Oral Daily     cholecalciferol  2,000 Units Oral Daily     lisinopril  10 mg Oral Daily     metoprolol succinate  25 mg Oral Daily     nicotine  1 patch Transdermal Q24H     nicotine  1 patch Transdermal Daily     nicotine   Transdermal Q8H     nicotine   Transdermal Daily     sodium chloride (PF)  3 mL Intracatheter Q8H     tamsulosin  0.4 mg Oral Daily     ticagrelor  90 mg Oral BID       Data     Most Recent 3 BMP's:  Recent Labs   Lab Test  10/23/18   0711  10/09/18   1345  09/22/18   0540   NA  142  138  142   POTASSIUM  3.7  4.7  3.8   CHLORIDE  108  101  110*   CO2  28  32  24   BUN  19  23  16   CR  0.78  0.80  0.62*   ANIONGAP  6  5  8   HANNAH  8.7  9.4  8.5   GLC  128*  145*  109*     Most Recent Cholesterol Panel:  Recent Labs   Lab Test  09/21/18   0550   CHOL  152   LDL  91   HDL  47   TRIG  71

## 2018-10-24 NOTE — PLAN OF CARE
Problem: Surgery Nonspecified (Adult)  Goal: Signs and Symptoms of Listed Potential Problems Will be Absent, Minimized or Managed (Surgery Nonspecified)  Signs and symptoms of listed potential problems will be absent, minimized or managed by discharge/transition of care (reference Surgery Nonspecified (Adult) CPG).  Outcome: Improving  Arrived to room around 1700. A&Ox4. Pleasant. Ambulating in the halls independently. Steady on feet. No c/o chest pain. Incision on left radial site C/D/I. Free from bleeding, drainage or hematoma. Pulse on LUE palpable. Tolerating 'regular' diet well. Free from N/V/D. Voiding. No fever. Tele NSR. VSS.

## 2018-10-24 NOTE — PLAN OF CARE
Problem: Patient Care Overview  Goal: Plan of Care/Patient Progress Review  Pt is A&OX4, VSS on ra.Up independently, ambulating in his room and voiding o.k. Lt radial artery site is c/d/i.CMS intact, I.v s/l on lt AC.

## 2018-10-24 NOTE — PLAN OF CARE
Problem: Patient Care Overview  Goal: Plan of Care/Patient Progress Review  Outcome: Improving  A&O x4, VSS on RA. No c/o chest pain, dizziness or SOB. Pt ambulates independent and tolerates regular diet. Tele sinus estela. Ready to be discharge. Discharge instructions and education reviewed. Medication schedule and follow up appointment discussed. Pt had no questions. All belongings have been sent home with pt and family.

## 2018-10-25 DIAGNOSIS — I21.11 ST ELEVATION MYOCARDIAL INFARCTION INVOLVING RIGHT CORONARY ARTERY (H): ICD-10-CM

## 2018-10-25 DIAGNOSIS — I10 BENIGN ESSENTIAL HYPERTENSION: ICD-10-CM

## 2018-10-25 LAB
ANION GAP SERPL CALCULATED.3IONS-SCNC: 6 MMOL/L (ref 3–14)
BUN SERPL-MCNC: 17 MG/DL (ref 7–30)
CALCIUM SERPL-MCNC: 8.5 MG/DL (ref 8.5–10.1)
CHLORIDE SERPL-SCNC: 105 MMOL/L (ref 94–109)
CO2 SERPL-SCNC: 28 MMOL/L (ref 20–32)
CREAT SERPL-MCNC: 0.79 MG/DL (ref 0.66–1.25)
GFR SERPL CREATININE-BSD FRML MDRD: >90 ML/MIN/1.7M2
GLUCOSE SERPL-MCNC: 168 MG/DL (ref 70–99)
POTASSIUM SERPL-SCNC: 4.1 MMOL/L (ref 3.4–5.3)
SODIUM SERPL-SCNC: 139 MMOL/L (ref 133–144)

## 2018-10-25 PROCEDURE — 80048 BASIC METABOLIC PNL TOTAL CA: CPT | Performed by: PHYSICIAN ASSISTANT

## 2018-10-25 PROCEDURE — 36415 COLL VENOUS BLD VENIPUNCTURE: CPT | Performed by: PHYSICIAN ASSISTANT

## 2018-10-26 LAB — INTERPRETATION ECG - MUSE: NORMAL

## 2018-10-29 ENCOUNTER — OFFICE VISIT (OUTPATIENT)
Dept: CARDIOLOGY | Facility: CLINIC | Age: 63
End: 2018-10-29
Attending: PHYSICIAN ASSISTANT
Payer: COMMERCIAL

## 2018-10-29 VITALS
HEART RATE: 68 BPM | WEIGHT: 188.9 LBS | DIASTOLIC BLOOD PRESSURE: 61 MMHG | SYSTOLIC BLOOD PRESSURE: 97 MMHG | BODY MASS INDEX: 26.44 KG/M2 | HEIGHT: 71 IN

## 2018-10-29 DIAGNOSIS — I77.819 AORTIC DILATATION (H): ICD-10-CM

## 2018-10-29 DIAGNOSIS — I25.10 CAD IN NATIVE ARTERY: Primary | ICD-10-CM

## 2018-10-29 PROCEDURE — 99214 OFFICE O/P EST MOD 30 MIN: CPT | Performed by: PHYSICIAN ASSISTANT

## 2018-10-29 NOTE — PROGRESS NOTES
Service Date: 10/29/2018      HISTORY OF PRESENT ILLNESS:  Mr. Longoria is a pleasant 63-year-old gentleman who presents to the office today to follow up after a recent staged PCI.      His past cardiovascular history includes type 2 diabetes, hypertension and tobacco use along with a history of a recent inferior STEMI.  On the day of presentation, he developed dizziness associated with chest discomfort and shortness of breath.  He sought evaluation and was found to be having an inferior STEMI.  He was taken emergently to the Cardiac Catheterization Lab and was found to have a 95% thrombotic lesion in the proximal RCA.  He underwent drug-eluting stent placement x1 from the proximal to mid RCA.  He was also found to have significant disease in the left circumflex with an 80% lesion in the proximal circumflex at the bifurcation of the OM1 as well as an 80% lesion in the ostial portion of the OM1 and a 80%-90% stenosis in the mid circumflex.  It was recommended that he undergo a staged PCI.  He was also noted to have moderate disease in the LAD.  I saw the patient for followup in early October.  At that time he had not had any recurrent chest discomfort.  He had been noticing dizziness and lightheadedness about an hour after taking his morning medications which was worse when going from a sitting to standing position.  I recommended that he decrease his chlorthalidone from 25 mg daily to 12.5 mg daily as his LVEDP was normal during his recent catheterization and his BP was well controlled.  Again, he returns for followup after staged PCI today.      On the 23rd of this month, he had a drug-eluting stent placed to the mid circumflex.  He also had a drug-eluting stent placed to the ostial first obtuse marginal.  He also underwent stenting of the mid to distal circumflex.  The previously placed RCA stent was patent, but was noted to have a mild narrowing in the 25%-30% range in the ostial portion of the stent.  LVEDP  was 12.  His EF was normal.  It was mentioned that there was dilatation of the aorta.  I believe this was the ascending portion (although one part of the report does mention descending).  It was recommended that this be further evaluated.      At this point, Rk states he is overall feeling well.  He did feel some improvement in the lightheadedness with the decrease in chlorthalidone, but does continue to have some lightheadedness, again worse when going from a sitting to standing position.  His blood pressure is quite low in the office today.  Otherwise, he is tolerating his medication without difficulty.  He does not really have any new questions or concerns at this point.      CURRENT CARDIAC MEDICATIONS:   1.  Atorvastatin 40 mg daily.   2.  Lisinopril 10 mg daily.   3.  Toprol-XL 25 mg daily.   4.  Sublingual nitro p.r.n.   5.  Brilinta 90 mg b.i.d.   6.  Aspirin 81 mg daily.   7.  Chlorthalidone 12.5 mg daily.      The remainder of his medications, allergies and review of systems were reviewed and as are documented separately.      PHYSICAL EXAMINATION:   GENERAL:  The patient is a pleasant 63-year-old gentleman who appears his stated age.  He is in no apparent distress.   VITAL SIGNS:  His blood pressure is 97/61, pulse 68, weight is 188 pounds which is stable.     PULMONARY:  Breathing is nonlabored.  Lungs are clear to auscultation.   CARDIAC:  Reveals a regular rate and rhythm, no murmurs appreciated.   EXTREMITIES:  Lower extremities show no evidence of edema.   NEUROLOGIC:  Alert and oriented.      He did have an echocardiogram during his initial hospitalization on 09/21/2018.  This showed preserved LV systolic function.  Again, he did have apical dyskinesis at that time.  He was not felt to have any significant valvular heart disease.  It was noted that the ascending aorta was mildly dilated at 4.0 cm.      ASSESSMENT AND PLAN:  Mr. Longoria is a pleasant 63-year-old gentleman with a history of type 2  "diabetes, hypertension and prior tobacco use (the patient quit at the time of his MI), who suffered from an inferior STEMI in 09/2018.  At that time he went to the Cardiac Catheterization Lab and was found to have multivessel disease.  The culprit was a 95% thrombotic lesion in the proximal RCA.  He underwent drug-eluting stent placement x1 to that vessel at that time.  It was recommended that he undergo staged intervention to his circumflex and OM1 which was performed with 3 additional drug-eluting stents in mid October as detailed above.  Overall, he is doing quite well at this time aside from some lightheadedness.  His blood pressure is quite low in the office today.  At this point, I have asked him to discontinue the chlorthalidone altogether as again his LVEDP was noted to be normal during his recent cardiac catheterization.  If his symptoms do not improve, I have asked him to contact the office.  He remains on dual antiplatelet therapy and is aware he needs to continue this uninterrupted through late 10/2019.      During his recent angiogram,  He was noted to have some dilatation of the aorta.  It was recommended that there be further evaluation of this.  I did send a message to Dr. Donald to see exactly what portion of the aorta this was noted in and what type of additional followup is recommended.        I again congratulated him on complete cessation of tobacco use.  He will continue the remainder of his medication regimen unchanged aside from the above-mentioned change in chlorthalidone.  I recommended following up with Dr. Koenig in approximately 6 months.  Of course, I encouraged him to contact us sooner with questions or concerns.         JONA SORENSEN PA-C      ADDENDUM 10/30/2018:  Pt contacted the office today stating that he found out his brother has a \"hypertrophic cardiomyopathy and that his other brother has had multiple heart attacks\".  Rk was noted to have moderate to severe LVH on " his echo at the time of his MI, but this was concentric which is not a typical pattern with HCM. At any rate I think this warrants a further discussion with Dr Koenig (pt has not seen him since the time of his MI) to see if any additional work up is necessary (the patient does have at least one child that this could impact). Also I did have a chance to talk with Dr Donald about the aortic enlargement noted on his cath. It was in the ascending aorta and he recommended at CTA of the aorta. I had my nurse contact him with this recommendation and we will arrange follow up with Dr Koenig as well. Anjelica Doyle PA-C            D: 10/29/2018   T: 10/29/2018   MT: ROSAURA      Name:     JULIO HA   MRN:      [id number]        Account:      [id number]   :      1955           Service Date: 10/29/2018      Document: [id number]

## 2018-10-29 NOTE — PROGRESS NOTES
Please see separate dictation for HPI, PHYSICAL EXAM AND IMPRESSION/PLAN.    CURRENT MEDICATIONS:  Current Outpatient Prescriptions   Medication Sig Dispense Refill     acetaminophen (TYLENOL) 500 MG tablet Take 1,500 mg by mouth daily Arthritis pain       atorvastatin (LIPITOR) 40 MG tablet Take 1 tablet (40 mg) by mouth daily 90 tablet 3     blood glucose (NO BRAND SPECIFIED) lancets standard Use to test blood sugar 1 times daily or as directed. 100 each 11     blood glucose monitoring (NO BRAND SPECIFIED) meter device kit Use to test blood sugar 1 times daily or as directed. 1 kit 0     blood glucose monitoring (NO BRAND SPECIFIED) test strip Use to test blood sugars 1 times daily or as directed 100 strip 0     calcium carbonate 500 mg, elemental, (OS-HANNAH) 500 MG tablet Take 1 tablet by mouth daily       chlorthalidone (HYGROTON) 25 MG tablet Take 0.5 tablets (12.5 mg) by mouth daily 45 tablet 3     lisinopril (PRINIVIL/ZESTRIL) 10 MG tablet Take 1 tablet (10 mg) by mouth daily 90 tablet 3     metFORMIN (GLUCOPHAGE) 500 MG tablet Take 1 tablet (500 mg) by mouth 2 times daily (with meals) 360 tablet 1     metoprolol succinate (TOPROL-XL) 25 MG 24 hr tablet Take 1 tablet (25 mg) by mouth daily 90 tablet 3     nicotine (NICODERM CQ) 14 MG/24HR 24 hr patch Place 1 patch onto the skin every 24 hours 30 patch 1     nicotine (NICODERM CQ) 7 MG/24HR 24 hr patch Place 1 patch onto the skin daily 30 patch 3     nitroGLYcerin (NITROLINGUAL) 0.4 MG/SPRAY spray For chest pain spray 1 spray under tongue every 5 minutes for 3 doses. If symptoms persist 5 minutes after 1st dose call 911. 4.9 g 1     sildenafil (VIAGRA) 100 MG tablet Take 0.5-1 tablets ( mg) by mouth daily as needed 12 tablet 1     ticagrelor (BRILINTA) 90 MG tablet Take 1 tablet (90 mg) by mouth 2 times daily 90 tablet 3     Vitamin D, Cholecalciferol, 1000 units TABS Take 2,000 Units by mouth daily       aspirin 81 MG EC tablet Take 1 tablet (81 mg) by  mouth daily (Patient not taking: Reported on 10/29/2018) 90 tablet 3     tamsulosin (FLOMAX) 0.4 MG capsule Take 1 capsule (0.4 mg) by mouth daily (Patient not taking: Reported on 10/29/2018) 30 capsule 1       ALLERGIES:   No Known Allergies    PAST MEDICAL HISTORY:  Past Medical History:   Diagnosis Date     Anxiety      CAD (coronary artery disease), native coronary artery 2018     DM II (diabetes mellitus, type II), controlled (H)      ETOH abuse      HTN (hypertension)      ST elevation myocardial infarction (STEMI) of inferior wall (H) 09/2018       PAST SURGICAL HISTORY:  Past Surgical History:   Procedure Laterality Date     APPENDECTOMY       CARDIAC CATHERIZATION  09/21/2018    STEMI, PAMELA pRCA, needs further staged intervention     ORTHOPEDIC SURGERY  2014    R femur repair       SOCIAL HISTORY:  Social History     Social History     Marital status:      Spouse name: N/A     Number of children: N/A     Years of education: N/A     Social History Main Topics     Smoking status: Former Smoker     Quit date: 9/20/2018     Smokeless tobacco: Never Used     Alcohol use No     Drug use: No     Sexual activity: No     Other Topics Concern     None     Social History Narrative       FAMILY HISTORY:  Family History   Problem Relation Age of Onset     Breast Cancer Mother      Cancer Father      Cancer Brother      brain tumor/anerysm     Myocardial Infarction Brother        Review of Systems:  Skin:  Negative       Eyes:  Positive for glasses;cataracts reading glasses  ENT:  Negative      Respiratory:  Positive for dyspnea on exertion     Cardiovascular:    Positive for;lightheadedness;dizziness when getting up quickly  Gastroenterology: Positive for heartburn    Genitourinary:  Negative      Musculoskeletal:  Positive for   tightness/aches in right thigh due to a broken femur a few years ago per pt  Neurologic:  Negative      Psychiatric:  Negative      Heme/Lymph/Imm:  Negative      Endocrine:  Positive for  diabetes       Reviewed. Remainder of the note dictated.    Anjelica Doyle PA-C

## 2018-10-29 NOTE — LETTER
10/29/2018    Darrin Mendez MD  [address]  [facility] [address]    RE: Rk Longoria       Dear Colleague,    I had the pleasure of seeing Rk Longoria in the [facility].    Please see separate dictation for HPI, PHYSICAL EXAM AND IMPRESSION/PLAN.    CURRENT MEDICATIONS:  Current Outpatient Prescriptions   Medication Sig Dispense Refill     acetaminophen (TYLENOL) 500 MG tablet Take 1,500 mg by mouth daily Arthritis pain       atorvastatin (LIPITOR) 40 MG tablet Take 1 tablet (40 mg) by mouth daily 90 tablet 3     blood glucose (NO BRAND SPECIFIED) lancets standard Use to test blood sugar 1 times daily or as directed. 100 each 11     blood glucose monitoring (NO BRAND SPECIFIED) meter device kit Use to test blood sugar 1 times daily or as directed. 1 kit 0     blood glucose monitoring (NO BRAND SPECIFIED) test strip Use to test blood sugars 1 times daily or as directed 100 strip 0     calcium carbonate 500 mg, elemental, (OS-HANNAH) 500 MG tablet Take 1 tablet by mouth daily       chlorthalidone (HYGROTON) 25 MG tablet Take 0.5 tablets (12.5 mg) by mouth daily 45 tablet 3     lisinopril (PRINIVIL/ZESTRIL) 10 MG tablet Take 1 tablet (10 mg) by mouth daily 90 tablet 3     metFORMIN (GLUCOPHAGE) 500 MG tablet Take 1 tablet (500 mg) by mouth 2 times daily (with meals) 360 tablet 1     metoprolol succinate (TOPROL-XL) 25 MG 24 hr tablet Take 1 tablet (25 mg) by mouth daily 90 tablet 3     nicotine (NICODERM CQ) 14 MG/24HR 24 hr patch Place 1 patch onto the skin every 24 hours 30 patch 1     nicotine (NICODERM CQ) 7 MG/24HR 24 hr patch Place 1 patch onto the skin daily 30 patch 3     nitroGLYcerin (NITROLINGUAL) 0.4 MG/SPRAY spray For chest pain spray 1 spray under tongue every 5 minutes for 3 doses. If symptoms persist 5 minutes after 1st dose call 911. 4.9 g 1     sildenafil (VIAGRA) 100 MG tablet Take 0.5-1 tablets ( mg) by mouth daily as needed 12 tablet 1      ticagrelor (BRILINTA) 90 MG tablet Take 1 tablet (90 mg) by mouth 2 times daily 90 tablet 3     Vitamin D, Cholecalciferol, 1000 units TABS Take 2,000 Units by mouth daily       aspirin 81 MG EC tablet Take 1 tablet (81 mg) by mouth daily (Patient not taking: Reported on 10/29/2018) 90 tablet 3     tamsulosin (FLOMAX) 0.4 MG capsule Take 1 capsule (0.4 mg) by mouth daily (Patient not taking: Reported on 10/29/2018) 30 capsule 1       ALLERGIES:   No Known Allergies    PAST MEDICAL HISTORY:  Past Medical History:   Diagnosis Date     Anxiety      CAD (coronary artery disease), native coronary artery 2018     DM II (diabetes mellitus, type II), controlled (H)      ETOH abuse      HTN (hypertension)      ST elevation myocardial infarction (STEMI) of inferior wall (H) 09/2018       PAST SURGICAL HISTORY:  Past Surgical History:   Procedure Laterality Date     APPENDECTOMY       CARDIAC CATHERIZATION  09/21/2018    STEMI, PAMELA pRCA, needs further staged intervention     ORTHOPEDIC SURGERY  2014    R femur repair       SOCIAL HISTORY:  Social History     Social History     Marital status:      Spouse name: N/A     Number of children: N/A     Years of education: N/A     Social History Main Topics     Smoking status: Former Smoker     Quit date: 9/20/2018     Smokeless tobacco: Never Used     Alcohol use No     Drug use: No     Sexual activity: No     Other Topics Concern     None     Social History Narrative       FAMILY HISTORY:  Family History   Problem Relation Age of Onset     Breast Cancer Mother      Cancer Father      Cancer Brother      brain tumor/anerysm     Myocardial Infarction Brother        Review of Systems:  Skin:  Negative       Eyes:  Positive for glasses;cataracts reading glasses  ENT:  Negative      Respiratory:  Positive for dyspnea on exertion     Cardiovascular:    Positive for;lightheadedness;dizziness when getting up quickly  Gastroenterology: Positive for heartburn    Genitourinary:   Negative      Musculoskeletal:  Positive for   tightness/aches in right thigh due to a broken femur a few years ago per pt  Neurologic:  Negative      Psychiatric:  Negative      Heme/Lymph/Imm:  Negative      Endocrine:  Positive for diabetes       Reviewed. Remainder of the note dictated.    Anjelica Doyle PA-C        Service Date: 10/29/2018      HISTORY OF PRESENT ILLNESS:  Mr. Longoria is a pleasant 63-year-old gentleman who presents to the office today to follow up after a recent staged PCI.      His past cardiovascular history includes type 2 diabetes, hypertension and tobacco use along with a history of a recent inferior STEMI.  On the day of presentation, he developed dizziness associated with chest discomfort and shortness of breath.  He sought evaluation and was found to be having an inferior STEMI.  He was taken emergently to the Cardiac Catheterization Lab and was found to have a 95% thrombotic lesion in the proximal RCA.  He underwent drug-eluting stent placement x1 from the proximal to mid RCA.  He was also found to have significant disease in the left circumflex with an 80% lesion in the proximal circumflex at the bifurcation of the OM1 as well as an 80% lesion in the ostial portion of the OM1 and a 80%-90% stenosis in the mid circumflex.  It was recommended that he undergo a staged PCI.  He was also noted to have moderate disease in the LAD.  I saw the patient for followup in early October.  At that time he had not had any recurrent chest discomfort.  He had been noticing dizziness and lightheadedness about an hour after taking his morning medications which was worse when going from a sitting to standing position.  I recommended that he decrease his chlorthalidone from 25 mg daily to 12.5 mg daily as his LVEDP was normal during his recent catheterization and his BP was well controlled.  Again, he returns for followup after staged PCI today.      On the 23rd of this month, he had a  drug-eluting stent placed to the mid circumflex.  He also had a drug-eluting stent placed to the ostial first obtuse marginal.  He also underwent stenting of the mid to distal circumflex.  The previously placed RCA stent was patent, but was noted to have a mild narrowing in the 25%-30% range in the ostial portion of the stent.  LVEDP was 12.  His EF was normal.  It was mentioned that there was dilatation of the aorta.  I believe this was the ascending portion (although one part of the report does mention descending).  It was recommended that this be further evaluated.      At this point, Rk states he is overall feeling well.  He did feel some improvement in the lightheadedness with the decrease in chlorthalidone, but does continue to have some lightheadedness, again worse when going from a sitting to standing position.  His blood pressure is quite low in the office today.  Otherwise, he is tolerating his medication without difficulty.  He does not really have any new questions or concerns at this point.      CURRENT CARDIAC MEDICATIONS:   1.  Atorvastatin 40 mg daily.   2.  Lisinopril 10 mg daily.   3.  Toprol-XL 25 mg daily.   4.  Sublingual nitro p.r.n.   5.  Brilinta 90 mg b.i.d.   6.  Aspirin 81 mg daily.   7.  Chlorthalidone 12.5 mg daily.      The remainder of his medications, allergies and review of systems were reviewed and as are documented separately.      PHYSICAL EXAMINATION:   GENERAL:  The patient is a pleasant 63-year-old gentleman who appears his stated age.  He is in no apparent distress.   VITAL SIGNS:  His blood pressure is 97/61, pulse 68, weight is 188 pounds which is stable.     PULMONARY:  Breathing is nonlabored.  Lungs are clear to auscultation.   CARDIAC:  Reveals a regular rate and rhythm, no murmurs appreciated.   EXTREMITIES:  Lower extremities show no evidence of edema.   NEUROLOGIC:  Alert and oriented.      He did have an echocardiogram during his initial hospitalization on  09/21/2018.  This showed preserved LV systolic function.  Again, he did have apical dyskinesis at that time.  He was not felt to have any significant valvular heart disease.  It was noted that the ascending aorta was mildly dilated at 4.0 cm.      ASSESSMENT AND PLAN:  Mr. Longoria is a pleasant 63-year-old gentleman with a history of type 2 diabetes, hypertension and prior tobacco use (the patient quit at the time of his MI), who suffered from an inferior STEMI in 09/2018.  At that time he went to the Cardiac Catheterization Lab and was found to have multivessel disease.  The culprit was a 95% thrombotic lesion in the proximal RCA.  He underwent drug-eluting stent placement x1 to that vessel at that time.  It was recommended that he undergo staged intervention to his circumflex and OM1 which was performed with 3 additional drug-eluting stents in mid October as detailed above.  Overall, he is doing quite well at this time aside from some lightheadedness.  His blood pressure is quite low in the office today.  At this point, I have asked him to discontinue the chlorthalidone altogether as again his LVEDP was noted to be normal during his recent cardiac catheterization.  If his symptoms do not improve, I have asked him to contact the office.  He remains on dual antiplatelet therapy and is aware he needs to continue this uninterrupted through late 10/2019.      During his recent angiogram,  He was noted to have some dilatation of the aorta.  It was recommended that there be further evaluation of this.  I did send a message to Dr. Donald to see exactly what portion of the aorta this was noted in and what type of additional followup is recommended.        I again congratulated him on complete cessation of tobacco use.  He will continue the remainder of his medication regimen unchanged aside from the above-mentioned change in chlorthalidone.  I recommended following up with Dr. Koenig in approximately 6 months.  Of  "course, I encouraged him to contact us sooner with questions or concerns.         ANJELICA SORENSEN PA-C      ADDENDUM 10/30/2018:  Pt contacted the office today stating that he found out his brother has a \"hypertrophic cardiomyopathy and that his other brother has had multiple heart attacks\".  Julio was noted to have moderate to severe LVH on his echo at the time of his MI, but this was concentric which is not a typical pattern with HCM. At any rate I think this warrants a further discussion with Dr Koenig (pt has not seen him since the time of his MI) to see if any additional work up is necessary (the patient does have at least one child that this could impact). Also I did have a chance to talk with Dr Donald about the aortic enlargement noted on his cath. It was in the ascending aorta and he recommended at CTA of the aorta. I had my nurse contact him with this recommendation and we will arrange follow up with Dr Koenig as well. Anjelica Sorensen PA-C            D: 10/29/2018   T: 10/29/2018   MT: ROSAURA      Name:     JULIO HA   MRN:      [id number]        Account:      [id number]   :      1955           Service Date: 10/29/2018      Document: [id number]        Thank you for allowing me to participate in the care of your patient.      Sincerely,     Anjelica Sorensen PA-C     [facility]    cc:   Anjelica Sorensen PA-C  [address] [place] [address]        "

## 2018-10-29 NOTE — LETTER
10/29/2018      Darrin Mendez MD  [address]  [facility] [address]      RE: Rk Longoria       Dear Colleague,    I had the pleasure of seeing Rk Longoria in the [facility].    Service Date: 10/29/2018      HISTORY OF PRESENT ILLNESS:  Mr. Longoria is a pleasant 63-year-old gentleman who presents to the office today to follow up after a recent staged PCI.      His past cardiovascular history includes type 2 diabetes, hypertension and tobacco use along with a history of a recent inferior STEMI.  On the day of presentation, he developed dizziness associated with chest discomfort and shortness of breath.  He sought evaluation and was found to be having an inferior STEMI.  He was taken emergently to the Cardiac Catheterization Lab and was found to have a 95% thrombotic lesion in the proximal RCA.  He underwent drug-eluting stent placement x1 from the proximal to mid RCA.  He was also found to have significant disease in the left circumflex with an 80% lesion in the proximal circumflex at the bifurcation of the OM1 as well as an 80% lesion in the ostial portion of the OM1 and a 80%-90% stenosis in the mid circumflex.  It was recommended that he undergo a staged PCI.  He was also noted to have moderate disease in the LAD.  I saw the patient for followup in early October.  At that time he had not had any recurrent chest discomfort.  He had been noticing dizziness and lightheadedness about an hour after taking his morning medications which was worse when going from a sitting to standing position.  I recommended that he decrease his chlorthalidone from 25 mg daily to 12.5 mg daily as his LVEDP was normal during his recent catheterization and his BP was well controlled.  Again, he returns for followup after staged PCI today.      On the 23rd of this month, he had a drug-eluting stent placed to the mid circumflex.  He also had a drug-eluting stent placed to the ostial  first obtuse marginal.  He also underwent stenting of the mid to distal circumflex.  The previously placed RCA stent was patent, but was noted to have a mild narrowing in the 25%-30% range in the ostial portion of the stent.  LVEDP was 12.  His EF was normal.  It was mentioned that there was dilatation of the aorta.  I believe this was the ascending portion (although one part of the report does mention descending).  It was recommended that this be further evaluated.      At this point, Rk states he is overall feeling well.  He did feel some improvement in the lightheadedness with the decrease in chlorthalidone, but does continue to have some lightheadedness, again worse when going from a sitting to standing position.  His blood pressure is quite low in the office today.  Otherwise, he is tolerating his medication without difficulty.  He does not really have any new questions or concerns at this point.      CURRENT CARDIAC MEDICATIONS:   1.  Atorvastatin 40 mg daily.   2.  Lisinopril 10 mg daily.   3.  Toprol-XL 25 mg daily.   4.  Sublingual nitro p.r.n.   5.  Brilinta 90 mg b.i.d.   6.  Aspirin 81 mg daily.   7.  Chlorthalidone 12.5 mg daily.      The remainder of his medications, allergies and review of systems were reviewed and as are documented separately.      PHYSICAL EXAMINATION:   GENERAL:  The patient is a pleasant 63-year-old gentleman who appears his stated age.  He is in no apparent distress.   VITAL SIGNS:  His blood pressure is 97/61, pulse 68, weight is 188 pounds which is stable.     PULMONARY:  Breathing is nonlabored.  Lungs are clear to auscultation.   CARDIAC:  Reveals a regular rate and rhythm, no murmurs appreciated.   EXTREMITIES:  Lower extremities show no evidence of edema.   NEUROLOGIC:  Alert and oriented.      He did have an echocardiogram during his initial hospitalization on 09/21/2018.  This showed preserved LV systolic function.  Again, he did have apical dyskinesis at that time.  He  was not felt to have any significant valvular heart disease.  It was noted that the ascending aorta was mildly dilated at 4.0 cm.      ASSESSMENT AND PLAN:  Mr. Longoria is a pleasant 63-year-old gentleman with a history of type 2 diabetes, hypertension and prior tobacco use (the patient quit at the time of his MI), who suffered from an inferior STEMI in 09/2018.  At that time he went to the Cardiac Catheterization Lab and was found to have multivessel disease.  The culprit was a 95% thrombotic lesion in the proximal RCA.  He underwent drug-eluting stent placement x1 to that vessel at that time.  It was recommended that he undergo staged intervention to his circumflex and OM1 which was performed with 3 additional drug-eluting stents in mid October as detailed above.  Overall, he is doing quite well at this time aside from some lightheadedness.  His blood pressure is quite low in the office today.  At this point, I have asked him to discontinue the chlorthalidone altogether as again his LVEDP was noted to be normal during his recent cardiac catheterization.  If his symptoms do not improve, I have asked him to contact the office.  He remains on dual antiplatelet therapy and is aware he needs to continue this uninterrupted through late 10/2019.      During his recent angiogram,  He was noted to have some dilatation of the aorta.  It was recommended that there be further evaluation of this.  I did send a message to Dr. Donald to see exactly what portion of the aorta this was noted in and what type of additional followup is recommended.        I again congratulated him on complete cessation of tobacco use.  He will continue the remainder of his medication regimen unchanged aside from the above-mentioned change in chlorthalidone.  I recommended following up with Dr. Koenig in approximately 6 months.  Of course, I encouraged him to contact us sooner with questions or concerns.         JONA SORENSEN PA-C   "    ADDENDUM 10/30/2018:  Pt contacted the office today stating that he found out his brother has a \"hypertrophic cardiomyopathy and that his other brother has had multiple heart attacks\".  Julio was noted to have moderate to severe LVH on his echo at the time of his MI, but this was concentric which is not a typical pattern with HCM. At any rate I think this warrants a further discussion with Dr Koenig (pt has not seen him since the time of his MI) to see if any additional work up is necessary (the patient does have at least one child that this could impact). Also I did have a chance to talk with Dr Donald about the aortic enlargement noted on his cath. It was in the ascending aorta and he recommended at CTA of the aorta. I had my nurse contact him with this recommendation and we will arrange follow up with Dr Koenig as well. Anjelica Doyle PA-C            D: 10/29/2018   T: 10/29/2018   MT: ROSAURA      Name:     JULIO HA   MRN:      [id number]        Account:      [id number]   :      1955           Service Date: 10/29/2018      Document: [id number]           Outpatient Encounter Prescriptions as of 10/29/2018   Medication Sig Dispense Refill     acetaminophen (TYLENOL) 500 MG tablet Take 1,500 mg by mouth daily Arthritis pain       atorvastatin (LIPITOR) 40 MG tablet Take 1 tablet (40 mg) by mouth daily 90 tablet 3     blood glucose (NO BRAND SPECIFIED) lancets standard Use to test blood sugar 1 times daily or as directed. 100 each 11     blood glucose monitoring (NO BRAND SPECIFIED) meter device kit Use to test blood sugar 1 times daily or as directed. 1 kit 0     blood glucose monitoring (NO BRAND SPECIFIED) test strip Use to test blood sugars 1 times daily or as directed 100 strip 0     calcium carbonate 500 mg, elemental, (OS-HANNAH) 500 MG tablet Take 1 tablet by mouth daily       lisinopril (PRINIVIL/ZESTRIL) 10 MG tablet Take 1 tablet (10 mg) by mouth daily 90 tablet 3     metFORMIN " (GLUCOPHAGE) 500 MG tablet Take 1 tablet (500 mg) by mouth 2 times daily (with meals) 360 tablet 1     metoprolol succinate (TOPROL-XL) 25 MG 24 hr tablet Take 1 tablet (25 mg) by mouth daily 90 tablet 3     nicotine (NICODERM CQ) 14 MG/24HR 24 hr patch Place 1 patch onto the skin every 24 hours 30 patch 1     nicotine (NICODERM CQ) 7 MG/24HR 24 hr patch Place 1 patch onto the skin daily 30 patch 3     nitroGLYcerin (NITROLINGUAL) 0.4 MG/SPRAY spray For chest pain spray 1 spray under tongue every 5 minutes for 3 doses. If symptoms persist 5 minutes after 1st dose call 911. 4.9 g 1     sildenafil (VIAGRA) 100 MG tablet Take 0.5-1 tablets ( mg) by mouth daily as needed 12 tablet 1     ticagrelor (BRILINTA) 90 MG tablet Take 1 tablet (90 mg) by mouth 2 times daily 90 tablet 3     Vitamin D, Cholecalciferol, 1000 units TABS Take 2,000 Units by mouth daily       aspirin 81 MG EC tablet Take 1 tablet (81 mg) by mouth daily 90 tablet 3     tamsulosin (FLOMAX) 0.4 MG capsule Take 1 capsule (0.4 mg) by mouth daily 30 capsule 1     [DISCONTINUED] chlorthalidone (HYGROTON) 25 MG tablet Take 0.5 tablets (12.5 mg) by mouth daily 45 tablet 3     No facility-administered encounter medications on file as of 10/29/2018.        Again, thank you for allowing me to participate in the care of your patient.      Sincerely,    Anjelica Doyle PA-C     [facility]

## 2018-10-29 NOTE — PATIENT INSTRUCTIONS
Thank you for your [facility] visit today. Your provider has recommended the following:  Medication Changes:  STOP the chlorthalidone  Recommendations:  I will check with Dr Donald if he recommends a CT scan or ultrasound of the aorta  Follow-up:  See Dr Koenig for cardiology follow up in 6 months.    Reminder:  Please bring in your current medication list or your medication, over the counter supplements and vitamin bottles as we will review these at each office visit.

## 2018-10-29 NOTE — MR AVS SNAPSHOT
After Visit Summary   10/29/2018    Rk Longoria    MRN: [id number]           Patient Information     Date Of Birth          1955        Visit Information        Provider Department      10/29/2018 1:10 PM Anjelica Doyle PA-C [facility]        Today's Diagnoses     CAD in native artery    -  1    Aortic dilatation (H)          Care Instructions    Thank you for your  Heart Care visit today. Your provider has recommended the following:  Medication Changes:  STOP the chlorthalidone  Recommendations:  I will check with Dr Donald if he recommends a CT scan or ultrasound of the aorta  Follow-up:  See Dr Koenig for cardiology follow up in 6 months.    Reminder:  Please bring in your current medication list or your medication, over the counter supplements and vitamin bottles as we will review these at each office visit.                  Follow-ups after your visit        Your next 10 appointments already scheduled     Oct 30, 2018 10:20 AM CDT   SHORT with Darrin Mendez MD   [facility]    [address] [place] [address], [address]  [facility] [address] [phone]              Who to contact     If you have questions or need follow up information about today's clinic visit or your schedule please contact [facility] directly at [phone].  Normal or non-critical lab and imaging results will be communicated to you by MyChart, letter or phone within 4 business days after the clinic has received the results. If you do not hear from us within 7 days, please contact the clinic through MyChart or phone. If you have a critical or abnormal lab result, we will notify you by phone as soon as possible.  Submit refill requests through [employer] or call your pharmacy and they will forward the refill request to us. Please allow 3 business days for your refill to be  "completed.          Additional Information About Your Visit        Care EveryWhere ID     This is your Care EveryWhere ID. This could be used by other organizations to access your [facility] medical records  [id number]        Your Vitals Were     Pulse Height BMI (Body Mass Index)             68 1.803 m (5' 11\") 26.35 kg/m2          Blood Pressure from Last 3 Encounters:   10/29/18 97/61   10/24/18 115/72   10/09/18 128/60    Weight from Last 3 Encounters:   10/29/18 85.7 kg (188 lb 14.4 oz)   10/23/18 85.4 kg (188 lb 4.8 oz)   10/09/18 85.8 kg (189 lb 3.2 oz)              We Performed the Following     Follow-Up with Cardiac Advanced Practice Provider          Today's Medication Changes          These changes are accurate as of 10/29/18  1:43 PM.  If you have any questions, ask your nurse or doctor.               Stop taking these medicines if you haven't already. Please contact your care team if you have questions.     chlorthalidone 25 MG tablet   Commonly known as:  HYGROTON   Stopped by:  Anjelica Doyle PA-C                    Primary Care Provider Office Phone # Fax #    Darrin Dave Mendez [phone]       [address] [place] [id number]        Equal Access to Services     SHANTAL JOSUE AH: Hadii kings pablo hadasho Soporfirioali, waaxda luqadaha, qaybta kaalmada adeegyada, wilfrid packer. So [facility] [phone].    ATENCIÓN: Si habla español, tiene a shipley disposición servicios gratuitos de asistencia lingüística. ame al [phone].    We comply with applicable federal civil rights laws and [place] laws. We do not discriminate on the basis of race, color, national origin, age, disability, sex, sexual orientation, or gender identity.            Thank you!     Thank you for choosing [facility]  for your care. Our goal is always to provide you with excellent care. Hearing back from our patients is one way we can " continue to improve our services. Please take a few minutes to complete the written survey that you may receive in the mail after your visit with us. Thank you!             Your Updated Medication List - Protect others around you: Learn how to safely use, store and throw away your medicines at www.disposemymeds.org.          This list is accurate as of 10/29/18  1:43 PM.  Always use your most recent med list.                   Brand Name Dispense Instructions for use Diagnosis    acetaminophen 500 MG tablet    TYLENOL     Take 1,500 mg by mouth daily Arthritis pain        aspirin 81 MG EC tablet     90 tablet    Take 1 tablet (81 mg) by mouth daily    ST elevation myocardial infarction involving right coronary artery (H)       atorvastatin 40 MG tablet    LIPITOR    90 tablet    Take 1 tablet (40 mg) by mouth daily    ST elevation myocardial infarction involving left anterior descending (LAD) coronary artery (H)       blood glucose lancets standard    no brand specified    100 each    Use to test blood sugar 1 times daily or as directed.    Type 2 diabetes mellitus with hyperglycemia, without long-term current use of insulin (H)       blood glucose monitoring meter device kit    no brand specified    1 kit    Use to test blood sugar 1 times daily or as directed.    Type 2 diabetes mellitus with hyperglycemia, without long-term current use of insulin (H)       blood glucose monitoring test strip    no brand specified    100 strip    Use to test blood sugars 1 times daily or as directed    Type 2 diabetes mellitus with hyperglycemia, without long-term current use of insulin (H)       calcium carbonate 500 mg (elemental) 500 MG tablet    OS-HANNAH     Take 1 tablet by mouth daily        lisinopril 10 MG tablet    PRINIVIL/ZESTRIL    90 tablet    Take 1 tablet (10 mg) by mouth daily    ST elevation myocardial infarction involving left anterior descending (LAD) coronary artery (H)       metFORMIN 500 MG tablet    GLUCOPHAGE     360 tablet    Take 1 tablet (500 mg) by mouth 2 times daily (with meals)    Type 2 diabetes mellitus with hyperglycemia, without long-term current use of insulin (H)       metoprolol succinate 25 MG 24 hr tablet    TOPROL-XL    90 tablet    Take 1 tablet (25 mg) by mouth daily    ST elevation myocardial infarction involving left anterior descending (LAD) coronary artery (H), Tobacco use disorder       * nicotine 7 MG/24HR 24 hr patch    NICODERM CQ    30 patch    Place 1 patch onto the skin daily    ST elevation myocardial infarction involving left anterior descending (LAD) coronary artery (H), Tobacco use disorder, ST elevation myocardial infarction involving right coronary artery (H)       * nicotine 14 MG/24HR 24 hr patch    NICODERM CQ    30 patch    Place 1 patch onto the skin every 24 hours    Tobacco use disorder       nitroGLYcerin 0.4 MG/SPRAY spray    NITROLINGUAL    4.9 g    For chest pain spray 1 spray under tongue every 5 minutes for 3 doses. If symptoms persist 5 minutes after 1st dose call 911.    ST elevation myocardial infarction involving right coronary artery (H)       sildenafil 100 MG tablet    VIAGRA    12 tablet    Take 0.5-1 tablets ( mg) by mouth daily as needed    Corporo-venous occlusive erectile dysfunction       tamsulosin 0.4 MG capsule    FLOMAX    30 capsule    Take 1 capsule (0.4 mg) by mouth daily    Benign prostatic hyperplasia with weak urinary stream       ticagrelor 90 MG tablet    BRILINTA    90 tablet    Take 1 tablet (90 mg) by mouth 2 times daily    ST elevation myocardial infarction involving left anterior descending (LAD) coronary artery (H)       Vitamin D (Cholecalciferol) 1000 units Tabs      Take 2,000 Units by mouth daily        * Notice:  This list has 2 medication(s) that are the same as other medications prescribed for you. Read the directions carefully, and ask your doctor or other care provider to review them with you.

## 2018-10-30 ENCOUNTER — TELEPHONE (OUTPATIENT)
Dept: CARDIOLOGY | Facility: CLINIC | Age: 63
End: 2018-10-30

## 2018-10-30 ENCOUNTER — OFFICE VISIT (OUTPATIENT)
Dept: FAMILY MEDICINE | Facility: CLINIC | Age: 63
End: 2018-10-30
Payer: COMMERCIAL

## 2018-10-30 VITALS
HEART RATE: 60 BPM | RESPIRATION RATE: 16 BRPM | BODY MASS INDEX: 26.53 KG/M2 | TEMPERATURE: 97.5 F | DIASTOLIC BLOOD PRESSURE: 54 MMHG | WEIGHT: 190.2 LBS | SYSTOLIC BLOOD PRESSURE: 106 MMHG

## 2018-10-30 DIAGNOSIS — Z23 NEED FOR VACCINATION: ICD-10-CM

## 2018-10-30 DIAGNOSIS — I77.819 DILATATION OF AORTA (H): Primary | ICD-10-CM

## 2018-10-30 DIAGNOSIS — I10 BENIGN ESSENTIAL HYPERTENSION: ICD-10-CM

## 2018-10-30 DIAGNOSIS — E11.9 TYPE 2 DIABETES MELLITUS WITHOUT COMPLICATION, WITHOUT LONG-TERM CURRENT USE OF INSULIN (H): Primary | ICD-10-CM

## 2018-10-30 LAB — INTERPRETATION ECG - MUSE: NORMAL

## 2018-10-30 PROCEDURE — 90471 IMMUNIZATION ADMIN: CPT | Performed by: FAMILY MEDICINE

## 2018-10-30 PROCEDURE — 99214 OFFICE O/P EST MOD 30 MIN: CPT | Mod: 25 | Performed by: FAMILY MEDICINE

## 2018-10-30 PROCEDURE — 90732 PPSV23 VACC 2 YRS+ SUBQ/IM: CPT | Performed by: FAMILY MEDICINE

## 2018-10-30 ASSESSMENT — ENCOUNTER SYMPTOMS
CONSTITUTIONAL NEGATIVE: 1
CARDIOVASCULAR NEGATIVE: 1
GASTROINTESTINAL NEGATIVE: 1
RESPIRATORY NEGATIVE: 1
NEUROLOGICAL NEGATIVE: 1

## 2018-10-30 NOTE — TELEPHONE ENCOUNTER
Received a call from pt wanting to report a family hx of hypertrophic cardiomyopathy. Pt's brother had HOCM and the other brother has a hx of multiple MI's. Pt would like to know if he needs to have further follow up for this. Will route to Anjelica Zavala, RN    ADDENDUM 10/30/2018:  Thanks for the update. He was noted to have moderate to severe LVH on his echo at the time of his MI, but this was concentric which is not a typical pattern with HCM. At any rate I think this warrants a further discussion with Dr Koenig. Also I did have a chance to talk with Dr Donald about the aortic enlargement noted on his cath. It was in the ascending aorta and he recommended at CTA of the aorta.    Travis Donald MD Schumacher, Michelle M, PA-C                     Ascending and a CT angiogram is a good choice            Previous Messages       ----- Message -----      From: Anjelica Doyle PA-C      Sent: 10/29/2018   1:45 PM        To: Travis Donald MD   Subject: aortic eval                                       Quick clarification/question:   Your cath note on this pt mentioned an aortic dilatation, in 2 places it says ascending and in 1 it says descending. I think it is ascending, but wanted to clarify.   His prior echo said asc Aorta Diam: 4.0 cm. Do you recommend a CTA too or is this ok? Or did you want a full aortic ultrasound?     Anjelica          Let's have him get the CTA and then follow up with Dr Koenig, no hurry but I don't want him to wait 6 months like I previously suggested before I had this additional information. Anjelica Doyle PA-C

## 2018-10-30 NOTE — MR AVS SNAPSHOT
After Visit Summary   10/30/2018    Rk Longoria    MRN: [id number]           Patient Information     Date Of Birth          1955        Visit Information        Provider Department      10/30/2018 10:20 AM Darrin Mendez MD [facility]        Today's Diagnoses     Type 2 diabetes mellitus without complication, without long-term current use of insulin (H)    -  1    Benign essential hypertension        Need for vaccination           Follow-ups after your visit        Follow-up notes from your care team     Return in about 2 months (around 12/30/2018).      Who to contact     If you have questions or need follow up information about today's clinic visit or your schedule please contact [facility] directly at [phone].  Normal or non-critical lab and imaging results will be communicated to you by MyChart, letter or phone within 4 business days after the clinic has received the results. If you do not hear from us within 7 days, please contact the clinic through MyChart or phone. If you have a critical or abnormal lab result, we will notify you by phone as soon as possible.  Submit refill requests through [employer]) or call your pharmacy and they will forward the refill request to us. Please allow 3 business days for your refill to be completed.          Additional Information About Your Visit        Care EveryWhere ID     This is your Care EveryWhere ID. This could be used by other organizations to access your [facility] medical records  [id number]        Your Vitals Were     Pulse Temperature Respirations BMI (Body Mass Index)          60 97.5  F (36.4  C) (Oral) 16 26.53 kg/m2         Blood Pressure from Last 3 Encounters:   10/30/18 106/54   10/29/18 97/61   10/24/18 115/72    Weight from Last 3 Encounters:   10/30/18 190 lb 3.2 oz (86.3 kg)   10/29/18 188 lb 14.4 oz (85.7 kg)   10/23/18 188 lb 4.8 oz (85.4 kg)              We Performed the Following      PNEUMOCOCCAL VACCINE,ADULT,SQ OR IM        Primary Care Provider Office Phone # Fax #    Darrin Dave Mendez [phone]       [address]  [facility] [address]        Equal Access to Services     SHANTAL JOSUE : Hadolamide kings pablo yonio Soporfirioali, waaxda luqadaha, qaybta kaalmada adeegyada, wilfrid pulliamn jose ramirez rachael packer. So [facility] [phone].    ATENCIÓN: Si habla español, tiene a shipley disposición servicios gratuitos de asistencia lingüística. Llame al [phone].    We comply with applicable federal civil rights laws and [place] laws. We do not discriminate on the basis of race, color, national origin, age, disability, sex, sexual orientation, or gender identity.            Thank you!     Thank you for choosing [facility]  for your care. Our goal is always to provide you with excellent care. Hearing back from our patients is one way we can continue to improve our services. Please take a few minutes to complete the written survey that you may receive in the mail after your visit with us. Thank you!             Your Updated Medication List - Protect others around you: Learn how to safely use, store and throw away your medicines at www.disposemymeds.org.          This list is accurate as of 10/30/18 11:05 AM.  Always use your most recent med list.                   Brand Name Dispense Instructions for use Diagnosis    acetaminophen 500 MG tablet    TYLENOL     Take 1,500 mg by mouth daily Arthritis pain        aspirin 81 MG EC tablet     90 tablet    Take 1 tablet (81 mg) by mouth daily    ST elevation myocardial infarction involving right coronary artery (H)       atorvastatin 40 MG tablet    LIPITOR    90 tablet    Take 1 tablet (40 mg) by mouth daily    ST elevation myocardial infarction involving left anterior descending (LAD) coronary artery (H)       blood glucose lancets standard    no brand specified    100 each    Use to test blood sugar 1 times daily or  as directed.    Type 2 diabetes mellitus with hyperglycemia, without long-term current use of insulin (H)       blood glucose monitoring meter device kit    no brand specified    1 kit    Use to test blood sugar 1 times daily or as directed.    Type 2 diabetes mellitus with hyperglycemia, without long-term current use of insulin (H)       blood glucose monitoring test strip    no brand specified    100 strip    Use to test blood sugars 1 times daily or as directed    Type 2 diabetes mellitus with hyperglycemia, without long-term current use of insulin (H)       calcium carbonate 500 mg (elemental) 500 MG tablet    OS-HANNAH     Take 1 tablet by mouth daily        lisinopril 10 MG tablet    PRINIVIL/ZESTRIL    90 tablet    Take 1 tablet (10 mg) by mouth daily    ST elevation myocardial infarction involving left anterior descending (LAD) coronary artery (H)       metFORMIN 500 MG tablet    GLUCOPHAGE    360 tablet    Take 1 tablet (500 mg) by mouth 2 times daily (with meals)    Type 2 diabetes mellitus with hyperglycemia, without long-term current use of insulin (H)       metoprolol succinate 25 MG 24 hr tablet    TOPROL-XL    90 tablet    Take 1 tablet (25 mg) by mouth daily    ST elevation myocardial infarction involving left anterior descending (LAD) coronary artery (H), Tobacco use disorder       * nicotine 7 MG/24HR 24 hr patch    NICODERM CQ    30 patch    Place 1 patch onto the skin daily    ST elevation myocardial infarction involving left anterior descending (LAD) coronary artery (H), Tobacco use disorder, ST elevation myocardial infarction involving right coronary artery (H)       * nicotine 14 MG/24HR 24 hr patch    NICODERM CQ    30 patch    Place 1 patch onto the skin every 24 hours    Tobacco use disorder       nitroGLYcerin 0.4 MG/SPRAY spray    NITROLINGUAL    4.9 g    For chest pain spray 1 spray under tongue every 5 minutes for 3 doses. If symptoms persist 5 minutes after 1st dose call 911.    ST  elevation myocardial infarction involving right coronary artery (H)       sildenafil 100 MG tablet    VIAGRA    12 tablet    Take 0.5-1 tablets ( mg) by mouth daily as needed    Corporo-venous occlusive erectile dysfunction       tamsulosin 0.4 MG capsule    FLOMAX    30 capsule    Take 1 capsule (0.4 mg) by mouth daily    Benign prostatic hyperplasia with weak urinary stream       ticagrelor 90 MG tablet    BRILINTA    90 tablet    Take 1 tablet (90 mg) by mouth 2 times daily    ST elevation myocardial infarction involving left anterior descending (LAD) coronary artery (H)       Vitamin D (Cholecalciferol) 1000 units Tabs      Take 2,000 Units by mouth daily        * Notice:  This list has 2 medication(s) that are the same as other medications prescribed for you. Read the directions carefully, and ask your doctor or other care provider to review them with you.

## 2018-10-30 NOTE — PROGRESS NOTES
HPI    SUBJECTIVE:   Rk Longoria is a 63 year old male who presents to clinic today for the following health issues:        Hospital Follow-up Visit:    Hospital/Nursing Home/IP Rehab Facility: [facility]  Date of Admission: 10/23/18  Date of Discharge: 10/24/18  Reason(s) for Admission: stent insertions x3            Problems taking medications regularly:  None       Medication changes since discharge: stopped chlorthalidone       Problems adhering to non-medication therapy:      Summary of hospitalization:  [facility] discharge summary reviewed  Diagnostic Tests/Treatments reviewed.  Follow up needed: none  Other Healthcare Providers Involved in Patient s Care:         None  Update since discharge: improved.     Post Discharge Medication Reconciliation: discharge medications reconciled, continue medications without change.  Plan of care communicated with patient     Coding guidelines for this visit:  Type of Medical   Decision Making Face-to-Face Visit       within 7 Days of discharge Face-to-Face Visit        within 14 days of discharge   Moderate Complexity [id number]   High Complexity [id number]          Seen recently for additional stens, x3.  Did keep him overnight for observation.  Feeling well.  Follow up yesterday with cards, did stop chlorthalidone for persistent low blood pressure.  Due for echo soon to eval aorta which looked a little wide at stent.    Checks BS 1-2 times weekly.  Typically in the 125-150 range.    No tobacco since 9/20/19.  Occasionally using patch, vapes daily.     Review of Systems   Constitutional: Negative.    Respiratory: Negative.    Cardiovascular: Negative.    Gastrointestinal: Negative.    Neurological: Negative.          Physical Exam   Constitutional: He is oriented to person, place, and time and well-developed, well-nourished, and in no distress.   Eyes: Conjunctivae and EOM are normal.   Cardiovascular: Normal rate, regular rhythm and normal  heart sounds.    Pulmonary/Chest: Effort normal and breath sounds normal.   Musculoskeletal: He exhibits no edema.   Neurological: He is alert and oriented to person, place, and time.   Skin: Skin is warm and dry.   Vitals reviewed.      (E11.9) Type 2 diabetes mellitus without complication, without long-term current use of insulin (H)  (primary encounter diagnosis)  Comment: well controlled  Plan:     (I10) Benign essential hypertension  Comment: well controlled  Plan:       RTC in 2m    Darrin Mendez MD

## 2018-10-30 NOTE — TELEPHONE ENCOUNTER
Discussed recommendations with patient. He agrees with follow up plan. Scheduled with  on 12/10/18. Orders placed for CT angio. Will have  call pt to set up. DIETER Zavala

## 2018-11-06 NOTE — PROGRESS NOTES
Cardiac Rehab Discharge Summary    Reason for discharge:    Patient/family request discontinuation of services.    Progress towards goals:  Goals not met.  Barriers to achieving goals:   discharge on same date as initial evaluation.    Recommendation(s):    Continue home exercise program.

## 2018-11-26 ENCOUNTER — HOSPITAL ENCOUNTER (OUTPATIENT)
Dept: CARDIOLOGY | Facility: CLINIC | Age: 63
Discharge: HOME | End: 2018-11-26
Attending: PHYSICIAN ASSISTANT | Admitting: PHYSICIAN ASSISTANT
Payer: COMMERCIAL

## 2018-11-26 VITALS — DIASTOLIC BLOOD PRESSURE: 69 MMHG | SYSTOLIC BLOOD PRESSURE: 124 MMHG

## 2018-11-26 DIAGNOSIS — I77.819 DILATATION OF AORTA (H): ICD-10-CM

## 2018-11-26 LAB
CREAT BLD-MCNC: 0.8 MG/DL (ref 0.66–1.25)
GFR SERPL CREATININE-BSD FRML MDRD: >90 ML/MIN/1.7M2

## 2018-11-26 PROCEDURE — 25000128 H RX IP 250 OP 636: Performed by: PHYSICIAN ASSISTANT

## 2018-11-26 PROCEDURE — 71275 CT ANGIOGRAPHY CHEST: CPT | Mod: 26 | Performed by: INTERNAL MEDICINE

## 2018-11-26 PROCEDURE — 82565 ASSAY OF CREATININE: CPT

## 2018-11-26 PROCEDURE — 71275 CT ANGIOGRAPHY CHEST: CPT

## 2018-11-26 RX ORDER — DIPHENHYDRAMINE HCL 25 MG
25 CAPSULE ORAL
Status: DISCONTINUED | OUTPATIENT
Start: 2018-11-26 | End: 2018-11-27 | Stop reason: HOSPADM

## 2018-11-26 RX ORDER — ACYCLOVIR 200 MG/1
0-1 CAPSULE ORAL
Status: DISCONTINUED | OUTPATIENT
Start: 2018-11-26 | End: 2018-11-27 | Stop reason: HOSPADM

## 2018-11-26 RX ORDER — ONDANSETRON 2 MG/ML
4 INJECTION INTRAMUSCULAR; INTRAVENOUS
Status: DISCONTINUED | OUTPATIENT
Start: 2018-11-26 | End: 2018-11-27 | Stop reason: HOSPADM

## 2018-11-26 RX ORDER — METHYLPREDNISOLONE SODIUM SUCCINATE 125 MG/2ML
125 INJECTION, POWDER, LYOPHILIZED, FOR SOLUTION INTRAMUSCULAR; INTRAVENOUS
Status: DISCONTINUED | OUTPATIENT
Start: 2018-11-26 | End: 2018-11-27 | Stop reason: HOSPADM

## 2018-11-26 RX ORDER — IOPAMIDOL 755 MG/ML
500 INJECTION, SOLUTION INTRAVASCULAR ONCE
Status: COMPLETED | OUTPATIENT
Start: 2018-11-26 | End: 2018-11-26

## 2018-11-26 RX ORDER — DIPHENHYDRAMINE HYDROCHLORIDE 50 MG/ML
25-50 INJECTION INTRAMUSCULAR; INTRAVENOUS
Status: DISCONTINUED | OUTPATIENT
Start: 2018-11-26 | End: 2018-11-27 | Stop reason: HOSPADM

## 2018-11-26 RX ADMIN — SODIUM CHLORIDE 100 ML: 9 INJECTION, SOLUTION INTRAVENOUS at 10:41

## 2018-11-26 RX ADMIN — IOPAMIDOL 100 ML: 755 INJECTION, SOLUTION INTRAVENOUS at 10:41

## 2018-12-10 ENCOUNTER — OFFICE VISIT (OUTPATIENT)
Dept: CARDIOLOGY | Facility: CLINIC | Age: 63
End: 2018-12-10
Payer: COMMERCIAL

## 2018-12-10 VITALS
HEART RATE: 54 BPM | SYSTOLIC BLOOD PRESSURE: 135 MMHG | DIASTOLIC BLOOD PRESSURE: 77 MMHG | HEIGHT: 71 IN | BODY MASS INDEX: 27.44 KG/M2 | WEIGHT: 196 LBS

## 2018-12-10 DIAGNOSIS — I77.810 ASCENDING AORTA DILATATION (H): ICD-10-CM

## 2018-12-10 DIAGNOSIS — I42.2 HYPERTROPHIC CARDIOMYOPATHY (H): ICD-10-CM

## 2018-12-10 DIAGNOSIS — I25.10 CORONARY ARTERY DISEASE INVOLVING NATIVE CORONARY ARTERY OF NATIVE HEART WITHOUT ANGINA PECTORIS: ICD-10-CM

## 2018-12-10 DIAGNOSIS — I21.11 ST ELEVATION MYOCARDIAL INFARCTION INVOLVING RIGHT CORONARY ARTERY (H): Primary | ICD-10-CM

## 2018-12-10 DIAGNOSIS — Z95.5 S/P CORONARY ARTERY STENT PLACEMENT: ICD-10-CM

## 2018-12-10 PROCEDURE — 99215 OFFICE O/P EST HI 40 MIN: CPT | Performed by: INTERNAL MEDICINE

## 2018-12-10 ASSESSMENT — MIFFLIN-ST. JEOR: SCORE: 1706.18

## 2018-12-10 NOTE — LETTER
12/10/2018    Darrin Mendez MD   [address]  [facility] [address]    RE: Rk Stricklandeitzer       Dear Colleague,    I had the pleasure of seeing Rk Longoria in the [facility].    HPI and Plan:   This 63-year-old gentleman is seen for follow-up of recent complex cardiac history.  In late 2018 he presented with an acute inferior myocardial infarction and had stenting of culprit lesion in his proximal RCA.  I have again reviewed his angiograms.  He also has several severe lesions in his circumflex and diffuse disease of moderate to moderate severe nature in the mid LAD.  He subsequent underwent proximal and mid circumflex and first obtuse marginal stenting with drug-eluting stents also.  A subsequent echocardiogram, which I personally reviewed, showed a very thick septum and some thickening of the posterior wall.  The [employer] images were incomplete when I pulled them up from the archive today.  However I do not think it is concentric LVH and I think there is septal prominence and likely has a hypertrophic cardiomyopathy.  He was also noted to have some ascending aortic dilatation and a subsequent CT scan which I reviewed showed mild dilatation of the descending aorta at 42 mm.  The arch and descending thoracic aorta and aortic root were not dilated.  He does not report a significant history of untreated hypertension but had not had regular follow-up for some time.    He does note that an older brother in his early 70s  suddenly and was reported to have hypertrophic cardia myopathy, he does not know if he also had coronary disease.  There is no other family history of known cardiomyopathy.      He is now walking regularly and briskly with his dog.  He did not do cardiac rehab but  overall he is feeling normal and does not complain of any dyspnea on exertion, recurrent chest discomfort, or unusual limitations.  He is without claudication, bleeding issues,  focal neurologic symptoms, palpitations dizziness or syncope, orthostasis.  He initially did have some chronic lightheaded complaints which resolved after discontinuation of chlorthalidone.      He has successfully been able to discontinue tobacco.  He is no longer using nicotine patches.  I have emphasized the importance of this to be ongoing.  The chart notes his metoprolol was discontinued recently.  I cannot tell when or why and he is not sure.  He did not bring his medications today and only describes him as small pill this or small pill that.  Exam is detailed below.      In summary:  Vital signs stable with blood pressure controlled fairly well.  Lungs-clear  Cardiac-normal exam  Vascular-normal carotid radial femoral and pedal pulses.  Well-healed radial access sites.  No bruits.  No abdominal bruits.  Extremities-no edema     Impression/plan    1-recent inferior STEMI.  Intervene successfully acutely with drug-eluting stent.  Normal global ejection fraction following this.  Currently without ischemic heart failure or arrhythmia symptoms    2-multivessel coronary artery disease.  Severe lesions have all been stented.  He continues to have diffuse 50% mid LAD with some focal 70% mid LAD disease.  He is asymptomatic for ischemia.  This is a smaller vessel type II and may not be the best candidate for stenting.  I would continue risk factor intervention medical therapy at this time in the absence of symptoms.    3-status post multivessel stenting.  Tolerating dual antiplatelet therapy without issues.  So far his ticagrelor is covered by insurance but he is worried about a change coming in the new calendar year.  If there are financial issues, I would change to Plavix at that time she will be 3 months out from stenting.    4-strong suspicion of hypertrophic cardia myopathy.  Further evaluation warranted.  I will get a cardiac MRI with further recommendations awaiting the results that    5-mild aortic dilatation.   Asymptomatic.  May be related his history of hypertension.  Will get follow-up echo in a year.    6-hypertension, controlled.  Now only on lisinopril.  I am going to try to find out further why his metoprolol was discontinued.  However I do not see notes in current chart indicating Y.    7-tobacco abuse.  Currently abstinent.  Counseling done today about the importance of this    8-risk factor intervention and dyslipidemia.  Was not on statin prior to his event.  Now on atorvastatin 40 mg daily and tolerating this well.  It appears baseline LDL however wasaround 100 so we will continue the current dose .  He is no smoking.  Blood pressure better controlled but needs to be followed since discontinuation of metoprolol.  I reassess the role of metoprolol after his MRI.  He is exercising reasonably.  He understands that Mediterranean diet.    Pending the results of MRI which I ordered for this week, I will plan on having him back in 4 months to reassess for any restenosis issues, and I will see him back also in 10 months to discuss whether to continue dual antiplatelet therapy or not.  I would plan on a follow-up echo in about a  year or so.      Orders Placed This Encounter   Procedures     MRI Cardiac w/contrast     Follow-Up with Cardiologist     Follow-Up with Cardiac Advanced Practice Provider       No orders of the defined types were placed in this encounter.      Medications Discontinued During This Encounter   Medication Reason     metoprolol succinate (TOPROL-XL) 25 MG 24 hr tablet Discontinued by another Health Care Provider         Encounter Diagnoses   Name Primary?     ST elevation myocardial infarction involving right coronary artery (H) Yes     Hypertrophic cardiomyopathy (H)      Coronary artery disease involving native coronary artery of native heart without angina pectoris      Ascending aorta dilatation (H)      S/P coronary artery stent placement        CURRENT MEDICATIONS:  Current Outpatient  Medications   Medication Sig Dispense Refill     acetaminophen (TYLENOL) 500 MG tablet Take 1,500 mg by mouth daily Arthritis pain       aspirin 81 MG EC tablet Take 1 tablet (81 mg) by mouth daily 90 tablet 3     atorvastatin (LIPITOR) 40 MG tablet Take 1 tablet (40 mg) by mouth daily 90 tablet 3     calcium carbonate 500 mg, elemental, (OS-HANNAH) 500 MG tablet Take 1 tablet by mouth daily       lisinopril (PRINIVIL/ZESTRIL) 10 MG tablet Take 1 tablet (10 mg) by mouth daily 90 tablet 3     metFORMIN (GLUCOPHAGE) 500 MG tablet Take 1 tablet (500 mg) by mouth 2 times daily (with meals) 360 tablet 1     sildenafil (VIAGRA) 100 MG tablet Take 0.5-1 tablets ( mg) by mouth daily as needed 12 tablet 1     tamsulosin (FLOMAX) 0.4 MG capsule TAKE ONE CAPSULE BY MOUTH EVERY DAY 30 capsule 5     ticagrelor (BRILINTA) 90 MG tablet Take 1 tablet (90 mg) by mouth 2 times daily 90 tablet 3     Vitamin D, Cholecalciferol, 1000 units TABS Take 2,000 Units by mouth daily       blood glucose (NO BRAND SPECIFIED) lancets standard Use to test blood sugar 1 times daily or as directed. 100 each 11     blood glucose monitoring (NO BRAND SPECIFIED) meter device kit Use to test blood sugar 1 times daily or as directed. 1 kit 0     blood glucose monitoring (NO BRAND SPECIFIED) test strip Use to test blood sugars 1 times daily or as directed 100 strip 0     nicotine (NICODERM CQ) 14 MG/24HR 24 hr patch Place 1 patch onto the skin every 24 hours (Patient not taking: Reported on 12/10/2018) 30 patch 1     nicotine (NICODERM CQ) 7 MG/24HR 24 hr patch Place 1 patch onto the skin daily (Patient not taking: Reported on 12/10/2018) 30 patch 3     nitroGLYcerin (NITROLINGUAL) 0.4 MG/SPRAY spray For chest pain spray 1 spray under tongue every 5 minutes for 3 doses. If symptoms persist 5 minutes after 1st dose call 911. 4.9 g 1       ALLERGIES   No Known Allergies    PAST MEDICAL HISTORY:  Past Medical History:   Diagnosis Date     Anxiety      CAD  (coronary artery disease), native coronary artery      DM II (diabetes mellitus, type II), controlled (H)      ETOH abuse      HTN (hypertension)      ST elevation myocardial infarction (STEMI) of inferior wall (H) 2018       PAST SURGICAL HISTORY:  Past Surgical History:   Procedure Laterality Date     APPENDECTOMY       CARDIAC CATHERIZATION  2018    STEMI, PAMELA pRCA, needs further staged intervention     CARDIAC CATHERIZATION  10/23/2018    PAMELA stenting to mid circumflex, 1st OM, and mid to distal circumflex     ORTHOPEDIC SURGERY  2014    R femur repair       FAMILY HISTORY:  Family History   Problem Relation Age of Onset     Breast Cancer Mother      Cancer Father      Cancer Brother         brain tumor/anerysm     Myocardial Infarction Brother 70        Hypertropic cardiomyopathy       SOCIAL HISTORY:  Social History     Socioeconomic History     Marital status:      Spouse name: None     Number of children: None     Years of education: None     Highest education level: None   Social Needs     Financial resource strain: None     Food insecurity - worry: None     Food insecurity - inability: None     Transportation needs - medical: None     Transportation needs - non-medical: None   Occupational History     None   Tobacco Use     Smoking status: Former Smoker     Years: 80.00     Types: Cigarettes     Last attempt to quit: 2018     Years since quittin.2     Smokeless tobacco: Never Used   Substance and Sexual Activity     Alcohol use: No     Drug use: No     Sexual activity: No   Other Topics Concern     Parent/sibling w/ CABG, MI or angioplasty before 65F 55M? Not Asked   Social History Narrative     None       Review of Systems:  Skin:  Negative       Eyes:  Positive for glasses;cataracts reading glasses  ENT:  Negative      Respiratory:  Negative       Cardiovascular:    Positive for;lightheadedness;dizziness occ.  Gastroenterology: Negative      Genitourinary:  Negative     "  Musculoskeletal:  Negative      Neurologic:  Negative      Psychiatric:  Negative      Heme/Lymph/Imm:  Negative      Endocrine:  Positive for diabetes      Physical Exam:  Vitals: /77   Pulse 54   Ht 1.803 m (5' 11\")   Wt 88.9 kg (196 lb)   BMI 27.34 kg/m       Constitutional:  cooperative, alert and oriented, well developed, well nourished, in no acute distress        Skin:  warm and dry to the touch, no apparent skin lesions or masses noted          Head:  normocephalic, no masses or lesions        Eyes:  pupils equal and round;conjunctivae and lids unremarkable;sclera white;EOMS intact        Lymph:      ENT:  no pallor or cyanosis        Neck:  carotid pulses are full and equal bilaterally, JVP normal, no carotid bruit        Respiratory:  normal breath sounds, clear to auscultation, normal A-P diameter, normal symmetry, normal respiratory excursion, no use of accessory muscles         Cardiac: regular rhythm, normal S1/S2, no S3 or S4, apical impulse not displaced, no murmurs, gallops or rubs                pulses full and equal, no bruits auscultated                                        GI:  abdomen soft, non-tender, BS normoactive, no mass, no HSM, no bruits        Extremities and Muscular Skeletal:  no deformities, clubbing, cyanosis, erythema observed              Neurological:  no gross motor deficits        Psych:  affect appropriate, oriented to time, person and place        Thank you for allowing me to participate in the care of your patient.    Sincerely,     Norberto Koenig MD     [facility]    "

## 2018-12-10 NOTE — PROGRESS NOTES
HPI and Plan:   This 63-year-old gentleman is seen for follow-up of recent complex cardiac history.  In late 2018 he presented with an acute inferior myocardial infarction and had stenting of culprit lesion in his proximal RCA.  I have again reviewed his angiograms.  He also has several severe lesions in his circumflex and diffuse disease of moderate to moderate severe nature in the mid LAD.  He subsequent underwent proximal and mid circumflex and first obtuse marginal stenting with drug-eluting stents also.  A subsequent echocardiogram, which I personally reviewed, showed a very thick septum and some thickening of the posterior wall.  The [employer] images were incomplete when I pulled them up from the archive today.  However I do not think it is concentric LVH and I think there is septal prominence and likely has a hypertrophic cardiomyopathy.  He was also noted to have some ascending aortic dilatation and a subsequent CT scan which I reviewed showed mild dilatation of the descending aorta at 42 mm.  The arch and descending thoracic aorta and aortic root were not dilated.  He does not report a significant history of untreated hypertension but had not had regular follow-up for some time.    He does note that an older brother in his early 70s  suddenly and was reported to have hypertrophic cardia myopathy, he does not know if he also had coronary disease.  There is no other family history of known cardiomyopathy.      He is now walking regularly and briskly with his dog.  He did not do cardiac rehab but  overall he is feeling normal and does not complain of any dyspnea on exertion, recurrent chest discomfort, or unusual limitations.  He is without claudication, bleeding issues, focal neurologic symptoms, palpitations dizziness or syncope, orthostasis.  He initially did have some chronic lightheaded complaints which resolved after discontinuation of chlorthalidone.      He has successfully been able to  discontinue tobacco.  He is no longer using nicotine patches.  I have emphasized the importance of this to be ongoing.  The chart notes his metoprolol was discontinued recently.  I cannot tell when or why and he is not sure.  He did not bring his medications today and only describes him as small pill this or small pill that.  Exam is detailed below.      In summary:  Vital signs stable with blood pressure controlled fairly well.  Lungs-clear  Cardiac-normal exam  Vascular-normal carotid radial femoral and pedal pulses.  Well-healed radial access sites.  No bruits.  No abdominal bruits.  Extremities-no edema     Impression/plan    1-recent inferior STEMI.  Intervene successfully acutely with drug-eluting stent.  Normal global ejection fraction following this.  Currently without ischemic heart failure or arrhythmia symptoms    2-multivessel coronary artery disease.  Severe lesions have all been stented.  He continues to have diffuse 50% mid LAD with some focal 70% mid LAD disease.  He is asymptomatic for ischemia.  This is a smaller vessel type II and may not be the best candidate for stenting.  I would continue risk factor intervention medical therapy at this time in the absence of symptoms.    3-status post multivessel stenting.  Tolerating dual antiplatelet therapy without issues.  So far his ticagrelor is covered by insurance but he is worried about a change coming in the new calendar year.  If there are financial issues, I would change to Plavix at that time she will be 3 months out from stenting.    4-strong suspicion of hypertrophic cardia myopathy.  Further evaluation warranted.  I will get a cardiac MRI with further recommendations awaiting the results that    5-mild aortic dilatation.  Asymptomatic.  May be related his history of hypertension.  Will get follow-up echo in a year.    6-hypertension, controlled.  Now only on lisinopril.  I am going to try to find out further why his metoprolol was discontinued.   However I do not see notes in current chart indicating Y.    7-tobacco abuse.  Currently abstinent.  Counseling done today about the importance of this    8-risk factor intervention and dyslipidemia.  Was not on statin prior to his event.  Now on atorvastatin 40 mg daily and tolerating this well.  It appears baseline LDL however wasaround 100 so we will continue the current dose .  He is no smoking.  Blood pressure better controlled but needs to be followed since discontinuation of metoprolol.  I reassess the role of metoprolol after his MRI.  He is exercising reasonably.  He understands that Mediterranean diet.    Pending the results of MRI which I ordered for this week, I will plan on having him back in 4 months to reassess for any restenosis issues, and I will see him back also in 10 months to discuss whether to continue dual antiplatelet therapy or not.  I would plan on a follow-up echo in about a  year or so.      Orders Placed This Encounter   Procedures     MRI Cardiac w/contrast     Follow-Up with Cardiologist     Follow-Up with Cardiac Advanced Practice Provider       No orders of the defined types were placed in this encounter.      Medications Discontinued During This Encounter   Medication Reason     metoprolol succinate (TOPROL-XL) 25 MG 24 hr tablet Discontinued by another Health Care Provider         Encounter Diagnoses   Name Primary?     ST elevation myocardial infarction involving right coronary artery (H) Yes     Hypertrophic cardiomyopathy (H)      Coronary artery disease involving native coronary artery of native heart without angina pectoris      Ascending aorta dilatation (H)      S/P coronary artery stent placement        CURRENT MEDICATIONS:  Current Outpatient Medications   Medication Sig Dispense Refill     acetaminophen (TYLENOL) 500 MG tablet Take 1,500 mg by mouth daily Arthritis pain       aspirin 81 MG EC tablet Take 1 tablet (81 mg) by mouth daily 90 tablet 3     atorvastatin  (LIPITOR) 40 MG tablet Take 1 tablet (40 mg) by mouth daily 90 tablet 3     calcium carbonate 500 mg, elemental, (OS-HANNAH) 500 MG tablet Take 1 tablet by mouth daily       lisinopril (PRINIVIL/ZESTRIL) 10 MG tablet Take 1 tablet (10 mg) by mouth daily 90 tablet 3     metFORMIN (GLUCOPHAGE) 500 MG tablet Take 1 tablet (500 mg) by mouth 2 times daily (with meals) 360 tablet 1     sildenafil (VIAGRA) 100 MG tablet Take 0.5-1 tablets ( mg) by mouth daily as needed 12 tablet 1     tamsulosin (FLOMAX) 0.4 MG capsule TAKE ONE CAPSULE BY MOUTH EVERY DAY 30 capsule 5     ticagrelor (BRILINTA) 90 MG tablet Take 1 tablet (90 mg) by mouth 2 times daily 90 tablet 3     Vitamin D, Cholecalciferol, 1000 units TABS Take 2,000 Units by mouth daily       blood glucose (NO BRAND SPECIFIED) lancets standard Use to test blood sugar 1 times daily or as directed. 100 each 11     blood glucose monitoring (NO BRAND SPECIFIED) meter device kit Use to test blood sugar 1 times daily or as directed. 1 kit 0     blood glucose monitoring (NO BRAND SPECIFIED) test strip Use to test blood sugars 1 times daily or as directed 100 strip 0     nicotine (NICODERM CQ) 14 MG/24HR 24 hr patch Place 1 patch onto the skin every 24 hours (Patient not taking: Reported on 12/10/2018) 30 patch 1     nicotine (NICODERM CQ) 7 MG/24HR 24 hr patch Place 1 patch onto the skin daily (Patient not taking: Reported on 12/10/2018) 30 patch 3     nitroGLYcerin (NITROLINGUAL) 0.4 MG/SPRAY spray For chest pain spray 1 spray under tongue every 5 minutes for 3 doses. If symptoms persist 5 minutes after 1st dose call 911. 4.9 g 1       ALLERGIES   No Known Allergies    PAST MEDICAL HISTORY:  Past Medical History:   Diagnosis Date     Anxiety      CAD (coronary artery disease), native coronary artery 2018     DM II (diabetes mellitus, type II), controlled (H)      ETOH abuse      HTN (hypertension)      ST elevation myocardial infarction (STEMI) of inferior wall (H) 09/2018  "      PAST SURGICAL HISTORY:  Past Surgical History:   Procedure Laterality Date     APPENDECTOMY       CARDIAC CATHERIZATION  2018    STEMI, PAMELA pRCA, needs further staged intervention     CARDIAC CATHERIZATION  10/23/2018    PAMELA stenting to mid circumflex, 1st OM, and mid to distal circumflex     ORTHOPEDIC SURGERY  2014    R femur repair       FAMILY HISTORY:  Family History   Problem Relation Age of Onset     Breast Cancer Mother      Cancer Father      Cancer Brother         brain tumor/anerysm     Myocardial Infarction Brother 70        Hypertropic cardiomyopathy       SOCIAL HISTORY:  Social History     Socioeconomic History     Marital status:      Spouse name: None     Number of children: None     Years of education: None     Highest education level: None   Social Needs     Financial resource strain: None     Food insecurity - worry: None     Food insecurity - inability: None     Transportation needs - medical: None     Transportation needs - non-medical: None   Occupational History     None   Tobacco Use     Smoking status: Former Smoker     Years: 80.00     Types: Cigarettes     Last attempt to quit: 2018     Years since quittin.2     Smokeless tobacco: Never Used   Substance and Sexual Activity     Alcohol use: No     Drug use: No     Sexual activity: No   Other Topics Concern     Parent/sibling w/ CABG, MI or angioplasty before 65F 55M? Not Asked   Social History Narrative     None       Review of Systems:  Skin:  Negative       Eyes:  Positive for glasses;cataracts reading glasses  ENT:  Negative      Respiratory:  Negative       Cardiovascular:    Positive for;lightheadedness;dizziness occ.  Gastroenterology: Negative      Genitourinary:  Negative      Musculoskeletal:  Negative      Neurologic:  Negative      Psychiatric:  Negative      Heme/Lymph/Imm:  Negative      Endocrine:  Positive for diabetes      Physical Exam:  Vitals: /77   Pulse 54   Ht 1.803 m (5' 11\")   Wt " 88.9 kg (196 lb)   BMI 27.34 kg/m      Constitutional:  cooperative, alert and oriented, well developed, well nourished, in no acute distress        Skin:  warm and dry to the touch, no apparent skin lesions or masses noted          Head:  normocephalic, no masses or lesions        Eyes:  pupils equal and round;conjunctivae and lids unremarkable;sclera white;EOMS intact        Lymph:      ENT:  no pallor or cyanosis        Neck:  carotid pulses are full and equal bilaterally, JVP normal, no carotid bruit        Respiratory:  normal breath sounds, clear to auscultation, normal A-P diameter, normal symmetry, normal respiratory excursion, no use of accessory muscles         Cardiac: regular rhythm, normal S1/S2, no S3 or S4, apical impulse not displaced, no murmurs, gallops or rubs                pulses full and equal, no bruits auscultated                                        GI:  abdomen soft, non-tender, BS normoactive, no mass, no HSM, no bruits        Extremities and Muscular Skeletal:  no deformities, clubbing, cyanosis, erythema observed              Neurological:  no gross motor deficits        Psych:  affect appropriate, oriented to time, person and place        CC  No referring provider defined for this encounter.

## 2019-03-31 DIAGNOSIS — N40.1 BENIGN PROSTATIC HYPERPLASIA WITH WEAK URINARY STREAM: ICD-10-CM

## 2019-03-31 DIAGNOSIS — R39.12 BENIGN PROSTATIC HYPERPLASIA WITH WEAK URINARY STREAM: ICD-10-CM

## 2019-03-31 NOTE — LETTER
[facility]  April 2, 2019      [place] [address]           Phone :  [phone]          Fax:  [phone]  Rk Longoria  [id number]  [facility] [address]      Dear Rk,    We recently received a call from your pharmacy requesting a refill of your medication - FLOMAX.    A review of your chart indicates that an appointment is required with your provider for your 6 month medication check and diabetes follow up. Please call the clinic at [phone] to schedule your appointment.    We have authorized one refill of your medication to allow time for you to schedule your appointment.    Taking care of your health is important to us, and ongoing visits with your provider are vital to your care.  We look forward to seeing you in the near future.        Sincerely,        Darrin Mendez MD / Maki Piña RN

## 2019-04-01 NOTE — TELEPHONE ENCOUNTER
"Requested Prescriptions   Pending Prescriptions Disp Refills     tamsulosin (FLOMAX) 0.4 MG capsule [Pharmacy Med Name: TAMSULOSIN HCL 0.4MG CAPS] 30 capsule 5    Last Written Prescription Date:  11/28/18  Last Fill Quantity: 30,  # refills: 5   Last Office Visit: 10/30/2018 Mendez      Return in about 2 months (around 12/30/2018).     Future Office Visit:      Sig: TAKE ONE CAPSULE BY MOUTH EVERY DAY    Alpha Blockers Failed - 3/31/2019 10:52 AM       Failed - Patient does not have Tadalafil, Vardenafil, or Sildenafil on their medication list       Passed - Blood pressure under 140/90 in past 12 months    BP Readings from Last 3 Encounters:   12/10/18 135/77   11/26/18 124/69   10/30/18 106/54                Passed - Recent (12 mo) or future (30 days) visit within the authorizing provider's specialty    Patient had office visit in the last 12 months or has a visit in the next 30 days with authorizing provider or within the authorizing provider's specialty.  See \"Patient Info\" tab in inbasket, or \"Choose Columns\" in Meds & Orders section of the refill encounter.             Passed - Medication is active on med list       Passed - Patient is 18 years of age or older        "

## 2019-04-02 RX ORDER — TAMSULOSIN HYDROCHLORIDE 0.4 MG/1
CAPSULE ORAL
Qty: 30 CAPSULE | Refills: 1 | Status: SHIPPED | OUTPATIENT
Start: 2019-04-02 | End: 2021-07-20

## 2019-04-02 NOTE — TELEPHONE ENCOUNTER
Medication is being filled for 1 time refill only due to:  Patient needs to be seen because he is due for follow up appointment and diabetes check. Letter sent to patient.   Maki Piña RN

## 2019-04-08 ENCOUNTER — TELEPHONE (OUTPATIENT)
Dept: FAMILY MEDICINE | Facility: CLINIC | Age: 64
End: 2019-04-08

## 2019-04-08 NOTE — TELEPHONE ENCOUNTER
Patient received the letter that we sent stating he is due for a office visit. Patient has moved down to the [facility] area and plans up est care in the [facility] but can get in for a couple of months. He is hoping in the meantime we will continue to refill his medications. I explain to patient that I will pass this message along and when he needs refills on his medications put the request in pharmacy and we can decide at that time.  No need to call patient unless you have questions and at this time he doesn't need refills on his medications.  Mirela Martinez

## 2019-04-23 DIAGNOSIS — I21.02 ST ELEVATION MYOCARDIAL INFARCTION INVOLVING LEFT ANTERIOR DESCENDING (LAD) CORONARY ARTERY (H): ICD-10-CM

## 2019-04-23 NOTE — TELEPHONE ENCOUNTER
"Requested Prescriptions   Pending Prescriptions Disp Refills     ticagrelor (BRILINTA) 90 MG tablet 90 tablet 3     Sig: Take 1 tablet (90 mg) by mouth 2 times daily   Last Written Prescription Date:  9/22/18  Last Fill Quantity: 90,  # refills: 3   Last Office Visit: 10/30/2018 Vanessa.  N/S 12/17 apt      Return in about 2 months (around 12/30/2018).     Future Office Visit:         Platelet Inhibitors Failed - 4/23/2019  9:12 AM        Failed - Normal Platelets on file in past 12 months     Recent Labs   Lab Test 10/23/18  0711   *               Passed - Normal ALT on file in past 12 months     Recent Labs   Lab Test 09/21/18  0124   ALT 32             Passed - Normal HGB on file in past 12 months     Recent Labs   Lab Test 10/23/18  0711   HGB 14.1               Passed - Normal AST on file in past 12 months     Recent Labs   Lab Test 09/21/18  0124   AST 26             Passed - Recent (12 mo) or future (30 days) visit within the authorizing provider's specialty     Patient had office visit in the last 12 months or has a visit in the next 30 days with authorizing provider or within the authorizing provider's specialty.  See \"Patient Info\" tab in inbasket, or \"Choose Columns\" in Meds & Orders section of the refill encounter.              Passed - Medication is active on med list        Passed - Patient is age 18 or older        Passed - Normal serum creatinine on file in past 12 months     Recent Labs   Lab Test 11/26/18  1003 10/25/18  1306   CR  --  0.79   CREAT 0.8  --              "

## 2019-04-23 NOTE — TELEPHONE ENCOUNTER
Routing refill request to provider for review/approval because:  Patient needs to be seen because:  He was advised to follow up in 2 months and no showed his appointment.    Maki Piña RN

## 2019-04-23 NOTE — TELEPHONE ENCOUNTER
Requested Prescriptions   Pending Prescriptions Disp Refills     ticagrelor (BRILINTA) 90 MG tablet 90 tablet 3     Sig: Take 1 tablet (90 mg) by mouth 2 times daily       There is no refill protocol information for this order        Patient asking for the generic of this medication to be written. This is no longer covered by his insurance. Generic will be covered.    Alma Delia Simms  FWF - TC/FD  4/23/2019 9:12 AM

## 2019-04-25 ENCOUNTER — TELEPHONE (OUTPATIENT)
Dept: CARDIOLOGY | Facility: CLINIC | Age: 64
End: 2019-04-25

## 2019-04-25 DIAGNOSIS — Z95.5 S/P CORONARY ARTERY STENT PLACEMENT: ICD-10-CM

## 2019-04-25 DIAGNOSIS — I25.10 CORONARY ARTERY DISEASE INVOLVING NATIVE CORONARY ARTERY OF NATIVE HEART WITHOUT ANGINA PECTORIS: Primary | ICD-10-CM

## 2019-04-25 RX ORDER — CLOPIDOGREL BISULFATE 75 MG/1
75 TABLET ORAL DAILY
Qty: 94 TABLET | Refills: 1 | Status: SHIPPED | OUTPATIENT
Start: 2019-04-25 | End: 2021-03-09

## 2019-04-25 NOTE — TELEPHONE ENCOUNTER
"Received call from pt stating his Brilinta is extremely expensive w/ insurance change. Per 's OV note on 12/20/18, \" If there are financial issues, I would change to Plavix at that time she will be 3 months out from stenting.\"     Pt spoke w/ insurance and Plavix is covered. Will send in a 90 day supply of Plavix. Instructed patient to take loading dose of 300 mg the first day he starts medication. 75 mg daily thereafter. Stenting 10/23/18. Pt should remain on DAPT for 1 yr post stenting. Pt will also establish cares in [place]. DIETER [place]    "

## 2019-07-26 ENCOUNTER — TELEPHONE (OUTPATIENT)
Dept: FAMILY MEDICINE | Facility: CLINIC | Age: 64
End: 2019-07-26

## 2019-07-26 NOTE — TELEPHONE ENCOUNTER
7/26/2019    Call Regarding Preventive Health Screening Colonoscopy    Attempt 1    Comments:

## 2019-09-06 ENCOUNTER — TELEPHONE (OUTPATIENT)
Dept: FAMILY MEDICINE | Facility: CLINIC | Age: 64
End: 2019-09-06

## 2019-09-06 NOTE — TELEPHONE ENCOUNTER
Panel Management Review      Patient has the following on his problem list:     Depression / Dysthymia review    Measure:  Needs PHQ-9 score of 4 or less during index window.  Administer PHQ-9 and if score is 5 or more, send encounter to provider for next steps.    5 - 7 month window range: PHQ-9 due NOW     PHQ-9 SCORE 11/7/2013   PHQ-9 Total Score 9       If PHQ-9 recheck is 5 or more, route to provider for next steps.    Patient is due for:  PHQ9 and DAP    Diabetes    ASA: Passed    Last A1C  Lab Results   Component Value Date    A1C 5.6 09/19/2018    A1C 5.4 02/16/2018    A1C 10.1 11/13/2017     A1C tested: Passed    Last LDL:    Lab Results   Component Value Date    CHOL 152 09/21/2018     Lab Results   Component Value Date    HDL 47 09/21/2018     Lab Results   Component Value Date    LDL 91 09/21/2018     Lab Results   Component Value Date    TRIG 71 09/21/2018     No results found for: CHOLHDLRATIO  Lab Results   Component Value Date    NHDL 105 09/21/2018       Is the patient on a Statin? YES             Is the patient on Aspirin? YES    Medications     HMG CoA Reductase Inhibitors     atorvastatin (LIPITOR) 40 MG tablet       Salicylates     aspirin 81 MG EC tablet             Last three blood pressure readings:  BP Readings from Last 3 Encounters:   12/10/18 135/77   11/26/18 124/69   10/30/18 106/54       Date of last diabetes office visit: 05/30/19     Tobacco History:     History   Smoking Status     Former Smoker     Years: 80.00     Types: Cigarettes     Quit date: 9/20/2018   Smokeless Tobacco     Never Used         Hypertension   Last three blood pressure readings:  BP Readings from Last 3 Encounters:   12/10/18 135/77   11/26/18 124/69   10/30/18 106/54     Blood pressure: Passed    HTN Guidelines:  Less than 140/90      Composite cancer screening  Chart review shows that this patient is due/due soon for the following Colonoscopy  Summary:    Patient is due/failing the following:    Microalbumin, PHQ-9, Diabetic Foot Exam, A1C, Lipid and FOLLOW UP    Action needed:   Patient needs office visit for Diabetes Management.     Type of outreach:    Phone, spoke to patient.  Patient has moved and has transferred care to the [facility].      Questions for provider review:    None                                                                                                                                    Lisa Magill, [facility]       Chart routed to Provider .

## 2021-02-10 DIAGNOSIS — E11.65 TYPE 2 DIABETES MELLITUS WITH HYPERGLYCEMIA, WITHOUT LONG-TERM CURRENT USE OF INSULIN (H): ICD-10-CM

## 2021-02-12 NOTE — TELEPHONE ENCOUNTER
LMTCB- patient due for an appt. Please help schedule if calls back.     Ernestine Grant RN on 2/12/2021 at 9:42 AM

## 2021-02-22 ENCOUNTER — TRANSFERRED RECORDS (OUTPATIENT)
Dept: MULTI SPECIALTY CLINIC | Facility: CLINIC | Age: 66
End: 2021-02-22
Payer: MEDICARE

## 2021-02-22 LAB
ALBUMIN (URINE) MG/SPEC: 28.1 MG/L
ALBUMIN/CREATININE RATIO: 45 MG/G
CHOLESTEROL (EXTERNAL): 183 MG/DL (ref 0–199)
CREATININE (EXTERNAL): 0.8 MG/DL (ref 0.73–1.18)
CREATININE (URINE): 62 MG/DL
GFR ESTIMATED (EXTERNAL): >60 ML/MIN/1.73M2
HBA1C MFR BLD: 6.6 %
HDLC SERPL-MCNC: 39 MG/DL
HEP C HIM: ABNORMAL
LDL CHOLESTEROL CALCULATED (EXTERNAL): 98 MG/DL
NON HDL CHOLESTEROL (EXTERNAL): 144 MG/DL
POTASSIUM (EXTERNAL): 4.3 MMOL/L (ref 3.5–5.1)
TRIGLYCERIDES (EXTERNAL): 232 MG/DL

## 2021-03-04 ENCOUNTER — NURSE TRIAGE (OUTPATIENT)
Dept: FAMILY MEDICINE | Facility: CLINIC | Age: 66
End: 2021-03-04

## 2021-03-04 ENCOUNTER — HOSPITAL ENCOUNTER (EMERGENCY)
Facility: CLINIC | Age: 66
Discharge: HOME | End: 2021-03-04
Attending: EMERGENCY MEDICINE | Admitting: EMERGENCY MEDICINE
Payer: MEDICARE

## 2021-03-04 VITALS
HEART RATE: 90 BPM | RESPIRATION RATE: 16 BRPM | HEIGHT: 70 IN | TEMPERATURE: 96.9 F | WEIGHT: 210 LBS | OXYGEN SATURATION: 97 % | BODY MASS INDEX: 30.06 KG/M2 | SYSTOLIC BLOOD PRESSURE: 181 MMHG | DIASTOLIC BLOOD PRESSURE: 102 MMHG

## 2021-03-04 DIAGNOSIS — F41.9 ANXIETY: ICD-10-CM

## 2021-03-04 DIAGNOSIS — I10 HYPERTENSION, UNSPECIFIED TYPE: ICD-10-CM

## 2021-03-04 LAB
ANION GAP SERPL CALCULATED.3IONS-SCNC: 6 MMOL/L (ref 3–14)
BASOPHILS # BLD AUTO: 0 10E9/L (ref 0–0.2)
BASOPHILS NFR BLD AUTO: 0.3 %
BUN SERPL-MCNC: 10 MG/DL (ref 7–30)
CALCIUM SERPL-MCNC: 9.3 MG/DL (ref 8.5–10.1)
CHLORIDE SERPL-SCNC: 105 MMOL/L (ref 94–109)
CO2 SERPL-SCNC: 27 MMOL/L (ref 20–32)
CREAT SERPL-MCNC: 0.71 MG/DL (ref 0.66–1.25)
DIFFERENTIAL METHOD BLD: NORMAL
EOSINOPHIL # BLD AUTO: 0.1 10E9/L (ref 0–0.7)
EOSINOPHIL NFR BLD AUTO: 1.2 %
ERYTHROCYTE [DISTWIDTH] IN BLOOD BY AUTOMATED COUNT: 12.6 % (ref 10–15)
GFR SERPL CREATININE-BSD FRML MDRD: >90 ML/MIN/{1.73_M2}
GLUCOSE SERPL-MCNC: 143 MG/DL (ref 70–99)
HCT VFR BLD AUTO: 46.4 % (ref 40–53)
HGB BLD-MCNC: 16 G/DL (ref 13.3–17.7)
IMM GRANULOCYTES # BLD: 0 10E9/L (ref 0–0.4)
IMM GRANULOCYTES NFR BLD: 0.1 %
LYMPHOCYTES # BLD AUTO: 2.3 10E9/L (ref 0.8–5.3)
LYMPHOCYTES NFR BLD AUTO: 25.4 %
MCH RBC QN AUTO: 29.9 PG (ref 26.5–33)
MCHC RBC AUTO-ENTMCNC: 34.5 G/DL (ref 31.5–36.5)
MCV RBC AUTO: 87 FL (ref 78–100)
MONOCYTES # BLD AUTO: 0.4 10E9/L (ref 0–1.3)
MONOCYTES NFR BLD AUTO: 4.6 %
NEUTROPHILS # BLD AUTO: 6.3 10E9/L (ref 1.6–8.3)
NEUTROPHILS NFR BLD AUTO: 68.4 %
NRBC # BLD AUTO: 0 10*3/UL
NRBC BLD AUTO-RTO: 0 /100
PLATELET # BLD AUTO: 185 10E9/L (ref 150–450)
POTASSIUM SERPL-SCNC: 3.9 MMOL/L (ref 3.4–5.3)
RBC # BLD AUTO: 5.36 10E12/L (ref 4.4–5.9)
SODIUM SERPL-SCNC: 138 MMOL/L (ref 133–144)
TROPONIN I SERPL-MCNC: <0.015 UG/L (ref 0–0.04)
WBC # BLD AUTO: 9.2 10E9/L (ref 4–11)

## 2021-03-04 PROCEDURE — 85025 COMPLETE CBC W/AUTO DIFF WBC: CPT | Performed by: EMERGENCY MEDICINE

## 2021-03-04 PROCEDURE — 96374 THER/PROPH/DIAG INJ IV PUSH: CPT

## 2021-03-04 PROCEDURE — 93005 ELECTROCARDIOGRAM TRACING: CPT

## 2021-03-04 PROCEDURE — 250N000011 HC RX IP 250 OP 636: Performed by: EMERGENCY MEDICINE

## 2021-03-04 PROCEDURE — 80048 BASIC METABOLIC PNL TOTAL CA: CPT | Performed by: EMERGENCY MEDICINE

## 2021-03-04 PROCEDURE — 84484 ASSAY OF TROPONIN QUANT: CPT | Performed by: EMERGENCY MEDICINE

## 2021-03-04 PROCEDURE — 99285 EMERGENCY DEPT VISIT HI MDM: CPT | Mod: 25

## 2021-03-04 RX ORDER — LORAZEPAM 0.5 MG/1
0.5 TABLET ORAL EVERY 6 HOURS PRN
Qty: 12 TABLET | Refills: 0 | Status: SHIPPED | OUTPATIENT
Start: 2021-03-04 | End: 2021-04-30

## 2021-03-04 RX ORDER — LORAZEPAM 2 MG/ML
1 INJECTION INTRAMUSCULAR ONCE
Status: COMPLETED | OUTPATIENT
Start: 2021-03-04 | End: 2021-03-04

## 2021-03-04 RX ADMIN — LORAZEPAM 1 MG: 2 INJECTION INTRAMUSCULAR; INTRAVENOUS at 18:05

## 2021-03-04 ASSESSMENT — ENCOUNTER SYMPTOMS
NAUSEA: 1
DIZZINESS: 1
NERVOUS/ANXIOUS: 1
SHORTNESS OF BREATH: 0

## 2021-03-04 ASSESSMENT — MIFFLIN-ST. JEOR: SCORE: 1743.8

## 2021-03-04 NOTE — ED TRIAGE NOTES
Pt reports having high blood pressure over 200s since his PCP visit on 2/22.  New medication making pt feel sick so he stopped taking them.  Called nurse line and was told to come to ED.  Hx of 5 cardiac stints.  Pt feels dizzy and disorientated.

## 2021-03-04 NOTE — TELEPHONE ENCOUNTER
The patient is calling stating that he went to [facility] 1 week ago and his BP was 210/120. They took blood and he was dx days later with Hep C positive. He had his Covid  Vaccine (Pfizer)  9 days ago. He was started on HTN medication - lisinopril 20 mg 1 tablet daily, the other one was duloxetine 20 mg 1 tablet daily (for mild pain/depression/anxiety). He started taking both of them 2/24/2021 and by 2/27/2021 he was really dizzy and disoriented and sick to his stomach. He stopped them both. Today he feels like his blood pressure feels like it is off the charts high. Today dizzy and disoriented feels like his blood pressure is really up there. He has a hx of a heart attack and has 5 stents and is diabetic. He has numbness all over. Denies: chest pain, trouble breathing, SOB.      Son Ken   Nursing advice: Due to the severity and type of symptoms above and the patient's history  he is to report immediatly to the E.R. for evaluation.  The patient states that he can't go there because they just got done paying $4000 to [place].  I informed the patient that the clinic does not have the tools to support him as needed to keep him safe and he needs to go to the E.R. He stated that he will talk to the juarez to be and see what to do.  I asked if he wanted me to speak with anyone to explain the above and he stated I could speak with his son.  I spoke with Ken (son) and explained the above and he stated that he would get him to the E.R. right away.  Patient was given signs and symptoms to call 911.  Patient verbalizes good understanding, agrees with plan and states she needs no further support. Rebecca Duran REveretteN.    Additional Information    Negative: Shock suspected (e.g., cold/pale/clammy skin, too weak to stand, low BP, rapid pulse)    Negative: Difficult to awaken or acting confused (e.g., disoriented, slurred speech)    Negative: Fainted, and still feels dizzy afterwards    Negative: Severe difficulty breathing  (e.g., struggling for each breath, speaks in single words)    Negative: Overdose (accidental or intentional) of medications    Negative: New neurologic deficit that is present now: * Weakness of the face, arm, or leg on one side of the body * Numbness of the face, arm, or leg on one side of the body * Loss of speech or garbled speech    Negative: Heart beating < 50 beats per minute OR > 140 beats per minute    Negative: Sounds like a life-threatening emergency to the triager    Negative: SEVERE dizziness (e.g., unable to stand, requires support to walk, feels like passing out now)    Negative: SEVERE headache or neck pain    Negative: Spinning or tilting sensation (vertigo) present now and one or more stroke risk factors (i.e., hypertension, diabetes, prior stroke/TIA, heart attack, age over 60) (Exception: prior physician evaluation for this AND no different/worse than usual)    Negative: Difficulty breathing    Negative: Loss of vision or double vision    Negative: Extra heart beats OR irregular heart beating (i.e., 'palpitations')    Negative: Drinking very little and has signs of dehydration (e.g., no urine > 12 hours, very dry mouth, very lightheaded)    Patient sounds very sick or weak to the triager    Negative: Follows bleeding (e.g., stomach, rectum, vagina) (Exception: became dizzy from sight of small amount blood)    Protocols used: DIZZINESS-A-OH

## 2021-03-04 NOTE — ED PROVIDER NOTES
History   Chief Complaint:  Elevated Blood Pressure      HPI   Rk Longoria is a 65 year old male with history of hypertension, hyperlipidemia, CAD, a STEMI, and type II diabetes who presents for evaluation of an elevated blood pressure. On 2/22/2021 the patient was seen in a new primary care clinic at which time he was noted to have an elevated blood pressure of 205/106. He was prescribed Lisinopril and Duloxetine at that time, however shortly after starting these medications he started to feel nauseous, anxious, and dizzy. He stopped these medications after several days, however since then his systolic blood pressures have returned to the 200s. Due to concern for his persistently elevated blood pressures today, the patient came into the ED for evaluation. He denies any recent chest pain or shortness of breath. He notes that he has been more stressed recently due to living with his daughter and grandson.      Review of Systems   Respiratory: Negative for shortness of breath.    Cardiovascular: Negative for chest pain.   Gastrointestinal: Positive for nausea.   Neurological: Positive for dizziness.   Psychiatric/Behavioral: The patient is nervous/anxious.    All other systems reviewed and are negative.      Allergies:  No known drug allergies      Medications:  Tylenol   Aspirin 81 mg   Lipitor   Plavix   Lisinopril  Calcium carbonate   Metformin   Nitroglycerin   Viagra  Flomax     Past Medical History:    Anxiety  CAD   Diabetes mellitus, type II   Alcohol abuse   Hypertension   STEMI   Ascending aorta dilatation   Hypertrophic cardiomyopathy   Chronic hepatitis C without hepatic coma   BPH   Hyperlipidemia   Depression      Past Surgical History:    Appendectomy  Cardiac catheterization  Right femur repair      Family History:    Breast cancer - Mother   Cancer - Father   Brain tumor - Brother   Hypertrophic cardiomyopathy - Brother     Social History:  The patient presents to the ED alone.   Alcohol use:  "Negative, history of alcohol abuse   Tobacco use: Negative, former smoker  Drug use: Marijuana, otherwise denies   The patient lives with his daughter and grandson.     Physical Exam     Patient Vitals for the past 24 hrs:   BP Temp Temp src Pulse Resp SpO2 Height Weight   03/04/21 1830 (!) 155/104 -- -- 84 -- 97 % -- --   03/04/21 1800 (!) 191/109 -- -- 81 9 98 % -- --   03/04/21 1748 -- -- -- -- 26 98 % -- --   03/04/21 1747 (!) 225/147 -- -- 92 -- -- -- --   03/04/21 1400 (!) 195/90 96.9  F (36.1  C) Temporal 72 18 98 % 1.778 m (5' 10\") 95.3 kg (210 lb)       Physical Exam  GENERAL: well developed, pleasant, mildly disheveled   HEAD: atraumatic  EYES: pupils reactive, extraocular muscles intact, conjunctivae normal  ENT:  mucus membranes moist  NECK:  trachea midline, normal range of motion  RESPIRATORY: no tachypnea, breath sounds clear to auscultation   CVS: normal S1/S2, no murmurs, intact distal pulses  ABDOMEN: soft, nontender, nondistention  MUSCULOSKELETAL: no deformities  SKIN: warm and dry, no acute rashes or ulceration  NEURO: GCS 15, cranial nerves intact, alert and oriented x3  PSYCH:  Mood/affect normal    Emergency Department Course   ECG  ECG taken at 17:57:53, ECG read at 1802  Normal sinus rhythm, Left anterior fascicular block, Abnormal ECG   Rate 78 bpm. TX interval 164 ms. QRS duration 94 ms. QT/QTc 406/462 ms. P-R-T axes 62 -66 47.      Laboratory:   CBC: WBC 9.2, HGB 16.0,     BMP: Glucose 143 high, o/w WNL (Creatinine 0.71)   Troponin I 1406: <0.015      Emergency Department Course:  Reviewed:  I reviewed nursing notes, vitals and past medical history    Assessments:  1745: I obtained history and examined the patient as noted above.   1901: I updated and reassessed the patient.       Interventions:  1805 Ativan 1 mg IV     Disposition:  The patient was discharged to home.     Impression & Plan   CMS Diagnoses: None     Medical Decision Making:  Patient presents with concern about " blood pressure as well as anxiety and recently starting back up on blood pressure and something for anxiety.  Describing more side effects probably from the duloxetine.  Notes his blood pressures have been elevated and has been a bit irritable at home.  He was given Ativan with significant improvement in his blood pressure.  Certainly looks to have underlying blood pressure issues but probably a significant component of anxiety.  Discussed getting back into his primary care doctor and will give him a short course of Ativan to get into the clinic and also have him restarted on his blood pressure pills.  Rest of his work-up is negative.     Diagnosis:    ICD-10-CM   1. Hypertension, unspecified type  I10   2. Anxiety  F41.9      Discharge Medications:  New Prescriptions    LORAZEPAM (ATIVAN) 0.5 MG TABLET    Take 1 tablet (0.5 mg) by mouth every 6 hours as needed for anxiety        Scribe Disclosure:  I, Emile Aguayo, am serving as a scribe at 5:45 PM on 3/4/2021 to document services personally performed by Dr. Sandhu, based on my observations and the provider's statements to me.         Reji Sandhu MD  03/04/21 [id number]

## 2021-03-05 LAB — INTERPRETATION ECG - MUSE: NORMAL

## 2021-03-09 ENCOUNTER — OFFICE VISIT (OUTPATIENT)
Dept: FAMILY MEDICINE | Facility: CLINIC | Age: 66
End: 2021-03-09
Payer: COMMERCIAL

## 2021-03-09 VITALS
WEIGHT: 216 LBS | OXYGEN SATURATION: 96 % | HEIGHT: 70 IN | HEART RATE: 64 BPM | BODY MASS INDEX: 30.92 KG/M2 | SYSTOLIC BLOOD PRESSURE: 154 MMHG | TEMPERATURE: 98.2 F | RESPIRATION RATE: 16 BRPM | DIASTOLIC BLOOD PRESSURE: 84 MMHG

## 2021-03-09 DIAGNOSIS — I10 BENIGN ESSENTIAL HYPERTENSION: ICD-10-CM

## 2021-03-09 DIAGNOSIS — E11.65 TYPE 2 DIABETES MELLITUS WITH HYPERGLYCEMIA, WITHOUT LONG-TERM CURRENT USE OF INSULIN (H): Primary | ICD-10-CM

## 2021-03-09 DIAGNOSIS — E78.5 HYPERLIPIDEMIA LDL GOAL <100: ICD-10-CM

## 2021-03-09 PROCEDURE — 99214 OFFICE O/P EST MOD 30 MIN: CPT | Performed by: FAMILY MEDICINE

## 2021-03-09 RX ORDER — ATORVASTATIN CALCIUM 40 MG/1
40 TABLET, FILM COATED ORAL DAILY
Qty: 90 TABLET | Refills: 3 | Status: SHIPPED | OUTPATIENT
Start: 2021-03-09 | End: 2022-01-13

## 2021-03-09 RX ORDER — LISINOPRIL 40 MG/1
40 TABLET ORAL DAILY
Qty: 30 TABLET | Refills: 1 | Status: SHIPPED | OUTPATIENT
Start: 2021-03-09 | End: 2021-05-04

## 2021-03-09 ASSESSMENT — MIFFLIN-ST. JEOR: SCORE: 1771.02

## 2021-03-09 ASSESSMENT — ENCOUNTER SYMPTOMS
HEADACHES: 0
SHORTNESS OF BREATH: 0
PALPITATIONS: 0
CONSTITUTIONAL NEGATIVE: 1

## 2021-03-09 NOTE — PROGRESS NOTES
Assessment and Plan    (E11.65) Type 2 diabetes mellitus with hyperglycemia, without long-term current use of insulin (H)  (primary encounter diagnosis)  Comment: Well controlled, refilling  Plan: REVIEW OF HEALTH MAINTENANCE PROTOCOL ORDERS,         metFORMIN (GLUCOPHAGE) 500 MG tablet            (I10) Benign essential hypertension  Comment: will have recheck in 2w, refilling  Plan: lisinopril (ZESTRIL) 40 MG tablet            (E78.5) Hyperlipidemia LDL goal <100  Comment: refilling medication  Plan: atorvastatin (LIPITOR) 40 MG tablet, aspirin         (ASA) 81 MG EC tablet              RTC in 6m for CPE    MD Darrin Chappell MD      Dali Beckman is a 65 year old who presents for the following health issues     HPI       Diabetes Follow-up    How often are you checking your blood sugar? A few times a month  What time of day are you checking your blood sugars (select all that apply)?  After meals  Have you had any blood sugars above 200?  No  Have you had any blood sugars below 70?  No    What symptoms do you notice when your blood sugar is low?  Dizzy and Blurred vision    What concerns do you have today about your diabetes? None     Do you have any of these symptoms? (Select all that apply)  Excessive thirst    Have you had a diabetic eye exam in the last 12 months? No    Had moved away for a while.  When he got back to the area he started being seen at an  clinic.  There he was started on duloxetine which he didn't like and he stopped after about 4 days.  Seen recently at ED with HTN urgency, which was linked to anxiety.  BP improved with ativan.      Does continue with metformin.    Is not using Plavix currently, no lipitor.    Struggles with mood, does have history of depression.  Brother recently passed away.  Was having issues with irritability and being very short tempered.  Did use Wellbutrin in the past, but didn't tolerate it, although notes that he was still drinking at the  "time.    Does have chronic hepatitis C, has not yet been referred to GI, is worried about the cost.            Hyperlipidemia Follow-Up      Are you regularly taking any medication or supplement to lower your cholesterol?   No    Are you having muscle aches or other side effects that you think could be caused by your cholesterol lowering medication?  No    Hypertension Follow-up      Do you check your blood pressure regularly outside of the clinic? Yes     Are you following a low salt diet? No-trying to     Are your blood pressures ever more than 140 on the top number (systolic) OR more   than 90 on the bottom number (diastolic), for example 140/90? Yes    BP Readings from Last 2 Encounters:   03/09/21 (!) 154/84   03/04/21 (!) 181/102     Hemoglobin A1C (%)   Date Value   09/19/2018 5.6   02/16/2018 5.4     LDL Cholesterol Calculated (mg/dL)   Date Value   09/21/2018 91   11/03/2017 78         How many servings of fruits and vegetables do you eat daily?  0-1    On average, how many sweetened beverages do you drink each day (Examples: soda, juice, sweet tea, etc.  Do NOT count diet or artificially sweetened beverages)?   0    How many days per week do you exercise enough to make your heart beat faster? 7    How many minutes a day do you exercise enough to make your heart beat faster? 30 - 60    How many days per week do you miss taking your medication? 0        Review of Systems   Constitutional: Negative.    Eyes: Negative for visual disturbance.   Respiratory: Negative for shortness of breath.    Cardiovascular: Negative for chest pain, palpitations and peripheral edema.   Neurological: Negative for headaches.            Objective    BP (!) 154/84 (BP Location: Right arm, Patient Position: Sitting, Cuff Size: Adult Large)   Pulse 64   Temp 98.2  F (36.8  C) (Oral)   Resp 16   Ht 1.778 m (5' 10\")   Wt 98 kg (216 lb)   SpO2 96%   BMI 30.99 kg/m    Body mass index is 30.99 kg/m .  Physical Exam  Vitals signs " reviewed.   Eyes:      Conjunctiva/sclera: Conjunctivae normal.   Cardiovascular:      Rate and Rhythm: Normal rate and regular rhythm.      Heart sounds: Normal heart sounds.   Pulmonary:      Effort: Pulmonary effort is normal.      Breath sounds: Normal breath sounds.   Skin:     General: Skin is warm and dry.   Neurological:      Mental Status: He is alert and oriented to person, place, and time.

## 2021-03-11 PROBLEM — I21.3 STEMI (ST ELEVATION MYOCARDIAL INFARCTION) (H): Status: RESOLVED | Noted: 2018-09-21 | Resolved: 2021-03-11

## 2021-03-23 ENCOUNTER — ALLIED HEALTH/NURSE VISIT (OUTPATIENT)
Dept: NURSING | Facility: CLINIC | Age: 66
End: 2021-03-23
Payer: MEDICARE

## 2021-03-23 VITALS — SYSTOLIC BLOOD PRESSURE: 142 MMHG | DIASTOLIC BLOOD PRESSURE: 80 MMHG | HEART RATE: 71 BPM

## 2021-03-23 DIAGNOSIS — I10 BENIGN ESSENTIAL HYPERTENSION: Primary | ICD-10-CM

## 2021-03-23 PROCEDURE — 99207 PR NO CHARGE NURSE ONLY: CPT

## 2021-03-23 NOTE — NURSING NOTE
Rk Longoria is a 65 year old patient who comes in today for a Blood Pressure check.  Initial BP:  BP (!) 142/80 (BP Location: Right arm, Cuff Size: Adult Regular)   Pulse 71      71  Disposition: results routed to provider

## 2021-04-27 ENCOUNTER — TELEPHONE (OUTPATIENT)
Dept: FAMILY MEDICINE | Facility: CLINIC | Age: 66
End: 2021-04-27

## 2021-04-27 DIAGNOSIS — F32.0 MILD MAJOR DEPRESSION (H): Primary | ICD-10-CM

## 2021-04-27 NOTE — TELEPHONE ENCOUNTER
Reason for Call:  Other letter from insurance    Detailed comments: will only do a 90 day supply for lisinopril only.     Phone Number Patient can be reached at: Home number on file [phone] (home)    Best Time: any    Can we leave a detailed message on this number? YES    Call taken on 4/27/2021 at 10:13 AM by Shiresa H. Ormond

## 2021-04-28 NOTE — TELEPHONE ENCOUNTER
Ativan 0.5 mg       Last Written Prescription Date:  3/4/2021  Last Fill Quantity: 12,   # refills: 0  Last Office Visit: 3/9/2021  Future Office visit:       Routing refill request to provider for review/approval because:  Drug not on the [facility], [facility] or [facility] refill protocol or controlled substance    Irena TRIANA RN

## 2021-04-30 RX ORDER — LORAZEPAM 0.5 MG/1
0.5 TABLET ORAL EVERY 6 HOURS PRN
Qty: 12 TABLET | Refills: 0 | Status: SHIPPED | OUTPATIENT
Start: 2021-04-30 | End: 2021-07-22

## 2021-05-03 DIAGNOSIS — I10 BENIGN ESSENTIAL HYPERTENSION: ICD-10-CM

## 2021-05-03 NOTE — TELEPHONE ENCOUNTER
lisinopril (ZESTRIL) 40 MG tablet    Last Written Prescription Date:  3/9/2021  Last Fill Quantity: 30,  # refills: 1   Last office visit: 3/9/2021 with prescribing provider:  Dr. Mendez   Future Office Visit:      Routing refill request to provider for review/approval because:  BP Elevated    BP Readings from Last 3 Encounters:   03/23/21 (!) 142/80   03/09/21 (!) 154/84   03/04/21 (!) 181/102     Maki Piña RN

## 2021-05-04 RX ORDER — LISINOPRIL 40 MG/1
TABLET ORAL
Qty: 90 TABLET | Refills: 1 | Status: SHIPPED | OUTPATIENT
Start: 2021-05-04 | End: 2021-11-02

## 2021-07-06 ENCOUNTER — TELEPHONE (OUTPATIENT)
Dept: FAMILY MEDICINE | Facility: CLINIC | Age: 66
End: 2021-07-06

## 2021-07-06 NOTE — TELEPHONE ENCOUNTER
Reason for Call:  Medication or medication refill:    Do you use a [facility]?  Name of the pharmacy and phone number for the current request:  [facility] [phone]    Name of the medication requested: Gabapentin    Other request: neuropathy    Can we leave a detailed message on this number? YES    Phone number patient can be reached at: Home number on file [phone] (home)    Best Time: any    Call taken on 7/6/2021 at 10:53 AM by Shiresa H. Ormond

## 2021-07-08 NOTE — TELEPHONE ENCOUNTER
Pt has never used gabapentin that I can see.  If pt is having new sx he should have an appt.  Pt is due for visit in September for DM/CPE, he can wait until then if he likes (but needs to have this scheduled).  Could do virtual appt sooner for just this if he likes.    Darrin Mendez MD

## 2021-07-08 NOTE — TELEPHONE ENCOUNTER
Patient states that he has numbness from his mid-shin down through his foot and was told by his son-in-law (who is in the medical field) that he should be seen. PT declines due to financial strain. He will call back when he is ready to schedule for this.  -Sarah Beth Soriano

## 2021-07-17 DIAGNOSIS — F32.0 MILD MAJOR DEPRESSION (H): ICD-10-CM

## 2021-07-19 NOTE — TELEPHONE ENCOUNTER
Lorazepam  LRF 4/30/21, disp 12 with no refills  LOV 3/9/21    Routing refill request to provider for review/approval because:  Drug not on the [facility] refill protocol

## 2021-07-20 ENCOUNTER — NURSE TRIAGE (OUTPATIENT)
Dept: FAMILY MEDICINE | Facility: CLINIC | Age: 66
End: 2021-07-20

## 2021-07-20 NOTE — TELEPHONE ENCOUNTER
"S-(situation): Patient calling requesting appointment for abdominal pain.     B-(background): Patient states they have had their appendix out, years ago. Current abdominal pain started 7/17/21 with gradual onset.     A-(assessment): Abdominal pain located in lower stomach \"around the belly button area\". Patient reports pain as constant and moderate, interferes with normal activities, disrupting sleep. Patient notes \"problems on the toilet\" \"feels like if I could really go to the bathroom, it would help\" Last BM was this morning, not reported as hard, \"multi-colored\". No blood in stool. No chest pain, no vomiting, no diarrhea, no constipation, no urine problems, abdomen not tender to touch, no sweating, no difficulty breathing. Patient does note nausea.     R-(recommendations): Per protocol, advised ED/UC today. Patient declines. Patient states they will \"not wait another 5 hours to be seen\" in ED. Patient declines UC. Patient requests visit with PCP. No available appointments within a week. Scheduled patient for OV w/ A.B. 7/21/21, patient agreed with plan. Encouraged patient to be seen if symptoms worsen. No further questions or concerns at this time.       Reason for Disposition    Constant abdominal pain lasting > 2 hours    Additional Information    Negative: Passed out (i.e., fainted, collapsed and was not responding)    Negative: Shock suspected (e.g., cold/pale/clammy skin, too weak to stand, low BP, rapid pulse)    Negative: Sounds like a life-threatening emergency to the triager    Negative: SEVERE abdominal pain (e.g., excruciating)    Negative: Vomiting red blood or black (coffee ground) material    Negative: Bloody, black, or tarry bowel movements (Exception: chronic-unchanged black-grey bowel movements and is taking iron pills or Pepto-bismol)    Negative: Unable to urinate (or only a few drops) and bladder feels very full    Negative: Pain in scrotum persists > 1 hour    Negative: Chest pain    " "Negative: Pain is mainly in upper abdomen (if needed ask: 'is it mainly above the belly button?')    Answer Assessment - Initial Assessment Questions  1. LOCATION: \"Where does it hurt?\"       Hurts in lower stomach, around belly button area  2. RADIATION: \"Does the pain shoot anywhere else?\" (e.g., chest, back)      no  3. ONSET: \"When did the pain begin?\" (Minutes, hours or days ago)       Saturday 7/17/21  4. SUDDEN: \"Gradual or sudden onset?\"      gradual  5. PATTERN \"Does the pain come and go, or is it constant?\"     - If constant: \"Is it getting better, staying the same, or worsening?\"       (Note: Constant means the pain never goes away completely; most serious pain is constant and it progresses)      - If intermittent: \"How long does it last?\" \"Do you have pain now?\"      (Note: Intermittent means the pain goes away completely between bouts)      constant  6. SEVERITY: \"How bad is the pain?\"  (e.g., Scale 1-10; mild, moderate, or severe)     - MILD (1-3): doesn't interfere with normal activities, abdomen soft and not tender to touch      - MODERATE (4-7): interferes with normal activities or awakens from sleep, tender to touch      - SEVERE (8-10): excruciating pain, doubled over, unable to do any normal activities        Moderate, interferes with normal activities, disrupting sleep   7. RECURRENT SYMPTOM: \"Have you ever had this type of abdominal pain before?\" If so, ask: \"When was the last time?\" and \"What happened that time?\"       Never had before  8. CAUSE: \"What do you think is causing the abdominal pain?\"      I feel like i'm sick, but it doesn't feel like a cold or the flu  9. RELIEVING/AGGRAVATING FACTORS: \"What makes it better or worse?\" (e.g., movement, antacids, bowel movement)      No   10. OTHER SYMPTOMS: \"Has there been any vomiting, diarrhea, constipation, or urine problems?\"        \"problems on the toilet\" \"feels like if I could really go to the bathroom, it would help\" Last BM was this " morning, not reported as hard, multi-colored    Protocols used: ABDOMINAL PAIN - MALE-A-BRENTON GOLDEN RN

## 2021-07-22 RX ORDER — LORAZEPAM 0.5 MG/1
TABLET ORAL
Qty: 12 TABLET | Refills: 0 | Status: SHIPPED | OUTPATIENT
Start: 2021-07-22 | End: 2021-10-14

## 2021-07-26 NOTE — PROGRESS NOTES
"Pre-Visit Planning   Next 5 appointments (look out 90 days)    Jul 28, 2021 11:10 AM  (Arrive by 10:50 AM)  Office Visit with Naldo Jones PA-C  [facility] ([facility] ) [address] [place] [id number] [phone]          Appointment Notes for this encounter:   stomach issues. pain issues, depression    Questionnaires Reviewed/Assigned  Additional questionnaires assigned: PHQ-9, NABIL-7    {SCRIPTING IF PT ANSWERS \"Hi, my name is LYNNETTE Kasper RN and I am calling on behalf of your provider's office at [facility].  I am calling to confirm and prep your upcoming appointment on Wed 7/28/21 @ 6740 (1319) w/Naldo Jones PA-C to assess your stomach pain issues and depression. Are there any additional questions or concerns you'd like to review with your provider during your visit?  Pt thought GI issues may have been E. Coli from ?? His son who had been @ [facility] where there had been + cases of E. Coli & they share a bathroom.    Patient preferred phone number: [phone]    Spoke to patient via phone. Are there any additional questions or concerns you'd like to review with your provider during your visit? No     Visit is not preventive.    Meds  Is there anything on your medication list that needs to be updated? Yes: DONE    Current Outpatient Medications   Medication     acetaminophen (TYLENOL) 500 MG tablet     aspirin (ASA) 81 MG EC tablet     atorvastatin (LIPITOR) 40 MG tablet     blood glucose (NO BRAND SPECIFIED) lancets standard     blood glucose monitoring (NO BRAND SPECIFIED) meter device kit     blood glucose monitoring (NO BRAND SPECIFIED) test strip     calcium carbonate 500 mg, elemental, (OS-HANNAH) 500 MG tablet     lisinopril (ZESTRIL) 40 MG tablet     LORazepam (ATIVAN) 0.5 MG tablet     metFORMIN (GLUCOPHAGE) 500 MG tablet     nitroGLYcerin (NITROLINGUAL) 0.4 MG/SPRAY spray     Vitamin D, Cholecalciferol, " "1000 units TABS     No current facility-administered medications for this visit.     Which pharmacy do you prefer to use for medications during this visit if needed? [id number] - [place], [place] - [address] AT [facility]    Do you need refills on any of your medications? Yes: Ativan, Metformin and lisinopril    Health Maintenance Due   Topic Date Due     DEPRESSION ACTION PLAN  Never done     EYE EXAM  Never done     COLORECTAL CANCER SCREENING  Never done     ZOSTER IMMUNIZATION (1 of 2) Never done     PHQ-9  03/05/2015     A1C  12/19/2018     MICROALBUMIN  02/16/2019     DIABETIC FOOT EXAM  02/16/2019     LIPID  09/21/2019     MEDICARE ANNUAL WELLNESS VISIT  07/20/2020     FALL RISK ASSESSMENT  Never done     AORTIC ANEURYSM SCREENING (SYSTEM ASSIGNED)  Never done   MyChart  Patient is not active on MyChart. Encouraged MyChart activation.  PT DECLINED    Questionnaire Review   Advised patient to arrive early in order to complete questionnaires.    Call Summary  \"Thank you for your time today.  If anything comes up before your appointment, please feel free to contact us at [phone].\"    "

## 2021-07-28 ENCOUNTER — OFFICE VISIT (OUTPATIENT)
Dept: FAMILY MEDICINE | Facility: CLINIC | Age: 66
End: 2021-07-28
Payer: COMMERCIAL

## 2021-07-28 VITALS
DIASTOLIC BLOOD PRESSURE: 105 MMHG | BODY MASS INDEX: 30.36 KG/M2 | SYSTOLIC BLOOD PRESSURE: 179 MMHG | RESPIRATION RATE: 16 BRPM | WEIGHT: 211.6 LBS | HEART RATE: 90 BPM | OXYGEN SATURATION: 99 %

## 2021-07-28 DIAGNOSIS — R52 DIFFUSE PAIN: ICD-10-CM

## 2021-07-28 DIAGNOSIS — G47.00 INSOMNIA, UNSPECIFIED TYPE: Primary | ICD-10-CM

## 2021-07-28 DIAGNOSIS — F32.0 MILD MAJOR DEPRESSION (H): ICD-10-CM

## 2021-07-28 PROCEDURE — 99214 OFFICE O/P EST MOD 30 MIN: CPT | Performed by: PHYSICIAN ASSISTANT

## 2021-07-28 RX ORDER — TRAZODONE HYDROCHLORIDE 50 MG/1
50 TABLET, FILM COATED ORAL AT BEDTIME
Qty: 30 TABLET | Refills: 0 | Status: SHIPPED | OUTPATIENT
Start: 2021-07-28 | End: 2021-08-20

## 2021-07-28 RX ORDER — PREGABALIN 25 MG/1
25 CAPSULE ORAL 2 TIMES DAILY
Qty: 60 CAPSULE | Refills: 0 | Status: SHIPPED | OUTPATIENT
Start: 2021-07-28 | End: 2022-04-07

## 2021-07-28 ASSESSMENT — ANXIETY QUESTIONNAIRES
6. BECOMING EASILY ANNOYED OR IRRITABLE: MORE THAN HALF THE DAYS
3. WORRYING TOO MUCH ABOUT DIFFERENT THINGS: MORE THAN HALF THE DAYS
1. FEELING NERVOUS, ANXIOUS, OR ON EDGE: MORE THAN HALF THE DAYS
GAD7 TOTAL SCORE: 13
2. NOT BEING ABLE TO STOP OR CONTROL WORRYING: MORE THAN HALF THE DAYS
5. BEING SO RESTLESS THAT IT IS HARD TO SIT STILL: MORE THAN HALF THE DAYS
7. FEELING AFRAID AS IF SOMETHING AWFUL MIGHT HAPPEN: SEVERAL DAYS

## 2021-07-28 ASSESSMENT — PATIENT HEALTH QUESTIONNAIRE - PHQ9
5. POOR APPETITE OR OVEREATING: MORE THAN HALF THE DAYS
SUM OF ALL RESPONSES TO PHQ QUESTIONS 1-9: 15

## 2021-07-28 NOTE — PROGRESS NOTES
"    Assessment & Plan     Insomnia, unspecified type    Trial trazodone. Can continue melatonin and diphenhydramine if needed.    - traZODone (DESYREL) 50 MG tablet; Take 1 tablet (50 mg) by mouth At Bedtime      Mild major depression (H)    Start Lyrica for depression and diffuse pains. Patient previously tried Cymbalta but it caused nausea. Patient requested oxycodone since the ones from his neighbor worked well but will try Lyrica. Follow-up in 1 month for medication check along with diabetes and HTN follow-up.    - pregabalin (LYRICA) 25 MG capsule; Take 1 capsule (25 mg) by mouth 2 times daily      Diffuse pain    See above.    - pregabalin (LYRICA) 25 MG capsule; Take 1 capsule (25 mg) by mouth 2 times daily             BMI:   Estimated body mass index is 30.36 kg/m  as calculated from the following:    Height as of 3/9/21: 1.778 m (5' 10\").    Weight as of this encounter: 96 kg (211 lb 9.6 oz).       There are no Patient Instructions on file for this visit.    Return in about 1 month (around 8/28/2021) for Medication check, Routine Visit.    [facility]    Dali Beckman is a 66 year old who presents for the following health issues     HPI     Diabetes Follow-up    How often are you checking your blood sugar? A few times a month  What time of day are you checking your blood sugars (select all that apply)?  After meals  Have you had any blood sugars above 200?  No  Have you had any blood sugars below 70?  No    What symptoms do you notice when your blood sugar is low?  Shaky, Dizzy, Weak, Lethargy, Blurred vision, Confusion and Not to an extreme     What concerns do you have today about your diabetes? Other: Bottom of legs to feet are complete numb     Do you have any of these symptoms? (Select all that apply)  Numbness in feet, Excessive thirst and Blurry vision    Have you had a diabetic eye exam in the last 12 months? No        BP Readings from Last 2 " Encounters:   21 (!) 179/105   21 (!) 142/80     Hemoglobin A1C (%)   Date Value   2018 5.6   2018 5.4     LDL Cholesterol Calculated (mg/dL)   Date Value   2018 91   2017 78                 Depression Followup    How are you doing with your depression since your last visit? Worsened Between the pain and having a four year grandson living with him    Are you having other symptoms that might be associated with depression? Yes:  Lack of sleep. Has only slept 3 hours in the last 48 hrs. Patient took 10mg of meaton, liquid night time and pill form     Have you had a significant life event?  OTHER: Grandson and daughter living with them      Are you feeling anxious or having panic attacks?   Yes:  sometimes    Do you have any concerns with your use of alcohol or other drugs? No      Social History     Tobacco Use     Smoking status: Former Smoker     Years: 80.00     Types: Cigarettes     Quit date: 2018     Years since quittin.8     Smokeless tobacco: Never Used   Substance Use Topics     Alcohol use: No     Drug use: No     No flowsheet data found.  NABIL-7 SCORE 2013   Total Score 7     No flowsheet data found.  No flowsheet data found.    Suicide Assessment Five-step Evaluation and Treatment (SAFE-T)      Patient states that for the last three weeks he has not felt good. Patient states that his back, neck and stomach. And all those pains started 3 weeks ago. Describes it as achiness. Denies fever and chills. Has a sharper pain around umbilicus in abdomen.     Patient is having a lot of pain in his right knee. Took 2 oxycodone that his neighbor gave him which worked well.       Review of Systems   Constitutional, HEENT, cardiovascular, pulmonary, gi and gu systems are negative, except as otherwise noted.        Objective    BP (!) 179/105 (BP Location: Right arm, Patient Position: Sitting, Cuff Size: Adult Large)   Pulse 90   Resp 16   Wt 96 kg (211 lb 9.6 oz)   SpO2  99%   BMI 30.36 kg/m    Body mass index is 30.36 kg/m .       Physical Exam   GENERAL: healthy, alert and no distress  EYES: Eyes grossly normal to inspection, PERRL and conjunctivae and sclerae normal  RESP: lungs clear to auscultation - no rales, rhonchi or wheezes  CV: regular rate and rhythm, normal S1 S2, no S3 or S4, no murmur, click or rub, no peripheral edema and peripheral pulses strong  ABDOMEN: Mildly tender to palpation near umbilicus. No rebound tenderness or guarding. Otherwise soft, nontender, no hepatosplenomegaly, no masses and bowel sounds normal  MS: no gross musculoskeletal defects noted, no edema  SKIN: no suspicious lesions or rashes  NEURO: Normal strength and tone, mentation intact and speech normal  PSYCH: mentation appears normal, affect normal/bright    Labs deferred to next visit for diabetes check.

## 2021-07-29 ASSESSMENT — ANXIETY QUESTIONNAIRES: GAD7 TOTAL SCORE: 13

## 2021-08-18 DIAGNOSIS — G47.00 INSOMNIA, UNSPECIFIED TYPE: ICD-10-CM

## 2021-08-20 RX ORDER — TRAZODONE HYDROCHLORIDE 50 MG/1
TABLET, FILM COATED ORAL
Qty: 90 TABLET | Refills: 2 | Status: SHIPPED | OUTPATIENT
Start: 2021-08-20 | End: 2022-04-07

## 2021-08-20 NOTE — TELEPHONE ENCOUNTER
Prescription approved per [facility] Refill Protocol.    Sruthi Corrigan RN on 8/20/2021 at 11:08 AM

## 2021-10-12 DIAGNOSIS — F32.0 MILD MAJOR DEPRESSION (H): ICD-10-CM

## 2021-10-14 RX ORDER — LORAZEPAM 0.5 MG/1
TABLET ORAL
Qty: 12 TABLET | Refills: 0 | Status: SHIPPED | OUTPATIENT
Start: 2021-10-14 | End: 2022-01-13

## 2021-10-14 NOTE — TELEPHONE ENCOUNTER
Lorazepam 0.5 mg    Last Written Prescription Date:  7/22/2021  Last Fill Quantity: 12,  # refills: 0   Last office visit: 10/8/2021 with prescribing provider:     Future Office Visit:      Lulu Matta RN

## 2021-12-16 ENCOUNTER — DOCUMENTATION ONLY (OUTPATIENT)
Dept: FAMILY MEDICINE | Facility: CLINIC | Age: 66
End: 2021-12-16
Payer: COMMERCIAL

## 2021-12-16 NOTE — PROGRESS NOTES
Chart reviewed by Ambulatory Quality and Data team    Please abstract the following data from this visit with this patient into the appropriate field in Epic:      Other Tests found in the patient's chart through Chart Review/Care Everywhere:    A1c done by [facility] on this date: 2/22/21    Note to Abstraction: If this section is blank, no results were found via Chart Review/Care Everywhere.

## 2022-01-12 ENCOUNTER — NURSE TRIAGE (OUTPATIENT)
Dept: FAMILY MEDICINE | Facility: CLINIC | Age: 67
End: 2022-01-12
Payer: COMMERCIAL

## 2022-01-12 DIAGNOSIS — F32.0 MILD MAJOR DEPRESSION (H): ICD-10-CM

## 2022-01-12 DIAGNOSIS — I21.11 ST ELEVATION MYOCARDIAL INFARCTION INVOLVING RIGHT CORONARY ARTERY (H): ICD-10-CM

## 2022-01-12 RX ORDER — NITROGLYCERIN 400 UG/1
SPRAY ORAL
Qty: 4.9 G | Refills: 1 | Status: CANCELLED | OUTPATIENT
Start: 2022-01-12

## 2022-01-12 NOTE — TELEPHONE ENCOUNTER
Routing refill request to provider for review/approval because:  Drug not on the [facility] refill protocol     Irena TRIANA RN     Next 5 appointments (look out 90 days)    Jan 13, 2022  7:40 AM  (Arrive by 7:20 AM)  Provider Visit with Darrin Mendez MD  [facility] ([facility] ) [address] [facility] [address] [facility] [address] [address]  [phone]   Jan 20, 2022  1:00 PM  (Arrive by 12:40 PM)  Annual Wellness Visit with Darrin Mendez MD  [facility] ([facility] ) [address] [id number] [phone]

## 2022-01-12 NOTE — TELEPHONE ENCOUNTER
Pt transferred to triage    Pt reports for the for the last 24 hrs BP  > 250- has not checked it. He can tell.    Left arm numbness   Feeling short of breath    Asked pt why he didn't go into ED or call 911 and he said he is out of lorazepam. He cannot wait in the ED for 6 hours he just needs a refill of lorazepam.     Pt is talking without stopping/pause for a breath and at a very fast pace.     He has NOT checked BP w/ a machine, he knows when it gets above 250 he feels like he will black out. Sounds like hasn't yet.     Pt reports having 5 stents. Looks like 9/2018    He says he does not take the medication for recreation purposes. Pt reports that just a couple weeks ago he was having the same episode and took 2 or 3 lorazepam and it stopped him from having a heart attack.       Mei'moises Medeiros     RN adv pt should be seen in ED or call 911 pt refused/declined many attempts.   Pt would like to be seen in clinic and to get refills of nitroglycerin and lorazepam.     Did schedule him tomorrow in acute w/ SB, but adv again that w/ elevated BP, left arm numbness, and SOB he should be seen more urgently- pt refused. Pt at home with wife (works from home) he spoke to her during the triage call.     Next 5 appointments (look out 90 days)    Jan 13, 2022  7:40 AM  (Arrive by 7:20 AM)  Provider Visit with Darrin Mendez MD  [facility] ([facility] ) [address] [facility] [address] [phone]   Jan 20, 2022  1:00 PM  (Arrive by 12:40 PM)  Annual Wellness Visit with Darrin Mendez MD  [facility] ([facility] ) [address] [facility] [address] [address] [phone]          Irena TRIANA RN       Reason for Disposition    Systolic BP >= 160 OR Diastolic >= 100, and any cardiac or neurologic symptoms (e.g., chest pain, difficulty breathing, unsteady gait, blurred vision)    Answer Assessment  "- Initial Assessment Questions  1. BLOOD PRESSURE: \"What is the blood pressure?\" \"Did you take at least two measurements 5 minutes apart?\"      > 250   2. ONSET: \"When did you take your blood pressure?\"      Over 24 hours, going on intermittently for weeks  3. HOW: \"How did you obtain the blood pressure?\" (e.g., visiting nurse, automatic home BP monitor)      He just knows, he can feel it.   4. HISTORY: \"Do you have a history of high blood pressure?\"      Yes.   5. MEDICATIONS: \"Are you taking any medications for blood pressure?\" \"Have you missed any doses recently?\"      No. He is on lisinopril   6. OTHER SYMPTOMS: \"Do you have any symptoms?\" (e.g., headache, chest pain, blurred vision, difficulty breathing, weakness)      SOB and left arm numbness.   7. PREGNANCY: \"Is there any chance you are pregnant?\" \"When was your last menstrual period?\"      Na    Protocols used: HIGH BLOOD PRESSURE-A-OH      "

## 2022-01-13 ENCOUNTER — OFFICE VISIT (OUTPATIENT)
Dept: FAMILY MEDICINE | Facility: CLINIC | Age: 67
End: 2022-01-13
Payer: COMMERCIAL

## 2022-01-13 VITALS
DIASTOLIC BLOOD PRESSURE: 90 MMHG | WEIGHT: 211 LBS | OXYGEN SATURATION: 97 % | RESPIRATION RATE: 16 BRPM | TEMPERATURE: 98.1 F | SYSTOLIC BLOOD PRESSURE: 126 MMHG | HEIGHT: 70 IN | BODY MASS INDEX: 30.21 KG/M2 | HEART RATE: 75 BPM

## 2022-01-13 DIAGNOSIS — J43.1 PANLOBULAR EMPHYSEMA (H): ICD-10-CM

## 2022-01-13 DIAGNOSIS — B18.2 CHRONIC HEPATITIS C WITHOUT HEPATIC COMA (H): ICD-10-CM

## 2022-01-13 DIAGNOSIS — E78.5 HYPERLIPIDEMIA LDL GOAL <100: ICD-10-CM

## 2022-01-13 DIAGNOSIS — I21.11 ST ELEVATION MYOCARDIAL INFARCTION INVOLVING RIGHT CORONARY ARTERY (H): ICD-10-CM

## 2022-01-13 DIAGNOSIS — I77.810 ASCENDING AORTA DILATATION (H): ICD-10-CM

## 2022-01-13 DIAGNOSIS — I42.2 HYPERTROPHIC CARDIOMYOPATHY (H): ICD-10-CM

## 2022-01-13 DIAGNOSIS — E11.65 TYPE 2 DIABETES MELLITUS WITH HYPERGLYCEMIA, WITHOUT LONG-TERM CURRENT USE OF INSULIN (H): ICD-10-CM

## 2022-01-13 DIAGNOSIS — F32.0 MILD MAJOR DEPRESSION (H): ICD-10-CM

## 2022-01-13 DIAGNOSIS — I10 BENIGN ESSENTIAL HYPERTENSION: ICD-10-CM

## 2022-01-13 LAB
ALBUMIN SERPL-MCNC: 3.9 G/DL (ref 3.4–5)
ALP SERPL-CCNC: 58 U/L (ref 40–150)
ALT SERPL W P-5'-P-CCNC: 36 U/L (ref 0–70)
ANION GAP SERPL CALCULATED.3IONS-SCNC: 4 MMOL/L (ref 3–14)
AST SERPL W P-5'-P-CCNC: 28 U/L (ref 0–45)
BILIRUB SERPL-MCNC: 1.1 MG/DL (ref 0.2–1.3)
BUN SERPL-MCNC: 15 MG/DL (ref 7–30)
CALCIUM SERPL-MCNC: 9.4 MG/DL (ref 8.5–10.1)
CHLORIDE BLD-SCNC: 106 MMOL/L (ref 94–109)
CHOLEST SERPL-MCNC: 112 MG/DL
CO2 SERPL-SCNC: 28 MMOL/L (ref 20–32)
CREAT SERPL-MCNC: 0.91 MG/DL (ref 0.66–1.25)
FASTING STATUS PATIENT QL REPORTED: NO
GFR SERPL CREATININE-BSD FRML MDRD: >90 ML/MIN/1.73M2
GLUCOSE BLD-MCNC: 178 MG/DL (ref 70–99)
HBA1C MFR BLD: 8.1 % (ref 0–5.6)
HDLC SERPL-MCNC: 42 MG/DL
LDLC SERPL CALC-MCNC: 52 MG/DL
NONHDLC SERPL-MCNC: 70 MG/DL
POTASSIUM BLD-SCNC: 4.6 MMOL/L (ref 3.4–5.3)
PROT SERPL-MCNC: 7.5 G/DL (ref 6.8–8.8)
SODIUM SERPL-SCNC: 138 MMOL/L (ref 133–144)
TRIGL SERPL-MCNC: 89 MG/DL

## 2022-01-13 PROCEDURE — 99214 OFFICE O/P EST MOD 30 MIN: CPT | Performed by: FAMILY MEDICINE

## 2022-01-13 PROCEDURE — 80061 LIPID PANEL: CPT | Performed by: FAMILY MEDICINE

## 2022-01-13 PROCEDURE — 83036 HEMOGLOBIN GLYCOSYLATED A1C: CPT | Performed by: FAMILY MEDICINE

## 2022-01-13 PROCEDURE — 36415 COLL VENOUS BLD VENIPUNCTURE: CPT | Performed by: FAMILY MEDICINE

## 2022-01-13 PROCEDURE — 80053 COMPREHEN METABOLIC PANEL: CPT | Performed by: FAMILY MEDICINE

## 2022-01-13 PROCEDURE — 82043 UR ALBUMIN QUANTITATIVE: CPT | Performed by: FAMILY MEDICINE

## 2022-01-13 RX ORDER — NITROGLYCERIN 400 UG/1
SPRAY ORAL
Qty: 4.9 G | Refills: 1 | Status: SHIPPED | OUTPATIENT
Start: 2022-01-13 | End: 2022-01-27

## 2022-01-13 RX ORDER — LORAZEPAM 0.5 MG/1
0.5 TABLET ORAL EVERY 6 HOURS PRN
Qty: 12 TABLET | Refills: 0 | Status: SHIPPED | OUTPATIENT
Start: 2022-01-13 | End: 2022-08-04

## 2022-01-13 RX ORDER — LORAZEPAM 0.5 MG/1
TABLET ORAL
Qty: 12 TABLET
Start: 2022-01-13

## 2022-01-13 RX ORDER — ESCITALOPRAM OXALATE 10 MG/1
10 TABLET ORAL DAILY
Qty: 30 TABLET | Refills: 1 | Status: SHIPPED | OUTPATIENT
Start: 2022-01-13 | End: 2022-01-27

## 2022-01-13 RX ORDER — ATORVASTATIN CALCIUM 40 MG/1
40 TABLET, FILM COATED ORAL DAILY
Qty: 90 TABLET | Refills: 3 | Status: SHIPPED | OUTPATIENT
Start: 2022-01-13 | End: 2022-12-02

## 2022-01-13 ASSESSMENT — ANXIETY QUESTIONNAIRES
3. WORRYING TOO MUCH ABOUT DIFFERENT THINGS: SEVERAL DAYS
GAD7 TOTAL SCORE: 4
1. FEELING NERVOUS, ANXIOUS, OR ON EDGE: SEVERAL DAYS
7. FEELING AFRAID AS IF SOMETHING AWFUL MIGHT HAPPEN: NOT AT ALL
6. BECOMING EASILY ANNOYED OR IRRITABLE: SEVERAL DAYS
2. NOT BEING ABLE TO STOP OR CONTROL WORRYING: NOT AT ALL
GAD7 TOTAL SCORE: 4
GAD7 TOTAL SCORE: 4
4. TROUBLE RELAXING: SEVERAL DAYS
5. BEING SO RESTLESS THAT IT IS HARD TO SIT STILL: NOT AT ALL
7. FEELING AFRAID AS IF SOMETHING AWFUL MIGHT HAPPEN: NOT AT ALL

## 2022-01-13 ASSESSMENT — ENCOUNTER SYMPTOMS
HEADACHES: 0
CONSTITUTIONAL NEGATIVE: 1
PALPITATIONS: 0
SHORTNESS OF BREATH: 0
NERVOUS/ANXIOUS: 1

## 2022-01-13 ASSESSMENT — PATIENT HEALTH QUESTIONNAIRE - PHQ9
SUM OF ALL RESPONSES TO PHQ QUESTIONS 1-9: 6
SUM OF ALL RESPONSES TO PHQ QUESTIONS 1-9: 6
10. IF YOU CHECKED OFF ANY PROBLEMS, HOW DIFFICULT HAVE THESE PROBLEMS MADE IT FOR YOU TO DO YOUR WORK, TAKE CARE OF THINGS AT HOME, OR GET ALONG WITH OTHER PEOPLE: NOT DIFFICULT AT ALL

## 2022-01-13 ASSESSMENT — PAIN SCALES - GENERAL: PAINLEVEL: MILD PAIN (3)

## 2022-01-13 ASSESSMENT — MIFFLIN-ST. JEOR: SCORE: 1743.34

## 2022-01-13 NOTE — LETTER
January 14, 2022      Rk YUNG Swati  [address]  [place] [address]        Dear ,    We are writing to inform you of your test results.    Your A1c up quite a bit higher than the last check.  We can discuss this in more detail when we talk again in a couple of weeks.     Let me know if you have any questions,        Resulted Orders   HEMOGLOBIN A1C   Result Value Ref Range    Hemoglobin A1C 8.1 (H) 0.0 - 5.6 %      Comment:      Normal <5.7%   Prediabetes 5.7-6.4%    Diabetes 6.5% or higher     Note: Adopted from ADA consensus guidelines.    Narrative    Verified by repeat analysis       If you have any questions or concerns, please call the clinic at the number listed above.       Sincerely,      Darrin Mendez MD

## 2022-01-13 NOTE — PROGRESS NOTES
Assessment and Plan    (J43.1) Panlobular emphysema (H)  Comment: no current concerns    Plan:       (I42.2) Hypertrophic cardiomyopathy (H)  Comment: does not follow with cardiology, will work to maximize medical tx  Plan:     (F32.0) Mild major depression (H)  Comment: Advise starting on daily med.  Advsied that Ativan does not have direct effect on blood pressure, meaning spikes in BP are probably 2/2 panic attack or anxiety  Plan: LORazepam (ATIVAN) 0.5 MG tablet, escitalopram         (LEXAPRO) 10 MG tablet            (E11.65) Type 2 diabetes mellitus with hyperglycemia, without long-term current use of insulin (H)  Comment: A1c up, will refill med, consider new meds at f/u  Plan: metFORMIN (GLUCOPHAGE) 500 MG tablet, CANCELED:        Hemoglobin A1c            (I77.810) Ascending aorta dilatation (H)  Comment:   Plan:     (B18.2) Chronic hepatitis C without hepatic coma (H)  Comment: no current concerns  Plan:     (I21.11) ST elevation myocardial infarction involving right coronary artery (H)  Comment: refill per pt request  Plan: nitroGLYcerin (NITROLINGUAL) 0.4 MG/SPRAY spray            (E78.5) Hyperlipidemia LDL goal <100  Comment: refilling  Plan: atorvastatin (LIPITOR) 40 MG tablet            (I10) Benign essential hypertension  Comment: refilling  Plan: lisinopril (ZESTRIL) 40 MG tablet              RTC in 2w for video visit    Darrin Mendez MD        Dali Beckman is a 66 year old who presents for the following health issues     Did have COVID in mid December.  Has recovered.    Has been using Ativan more often, noting his BP has been running high, and this helps.  Is BP has been running high at home.  Notes he often has panic attacks, often while doing something very routine.  Had used Wellbutrin in the past, but didn't tolerate it well.    Needs refill on nitroglycerine.    Hasn't really been checking his BS at home.  Is currently.    Anxiety  Pertinent negatives include no chest pain or headaches.    History of Present Illness       Mental Health Follow-up:  Patient presents to follow-up on Anxiety.    Patient's anxiety since last visit has been:  Medium  The patient is having other symptoms associated with anxiety.  Any significant life events: health concerns  Patient is feeling anxious or having panic attacks.  Patient has no concerns about alcohol or drug use.     Social History  Tobacco Use    Smoking status: Former Smoker      Years: 80.00      Types: Cigarettes      Quit date: 9/20/2018      Years since quitting: 3.3    Smokeless tobacco: Never Used  Vaping Use    Vaping Use: Some days      Substances: Nicotine, Flavoring      Devices: Refillable tank  Alcohol use: No  Drug use: Yes    Types: Marijuana      Today's PHQ-9         PHQ-9 Total Score:     6   PHQ-9 Q9 Thoughts of better off dead/self-harm past 2 weeks :   Not at all   Thoughts of suicide or self harm:      Self-harm Plan:        Self-harm Action:          Safety concerns for self or others:           Diabetes:   He presents for follow up of diabetes.  He is checking home blood glucose a few times a month. He checks blood glucose after meals.  Blood glucose is never over 200 and never under 70. He is aware of hypoglycemia symptoms including blurred vision. He has no concerns regarding his diabetes at this time.  He is having numbness in feet, excessive thirst and blurry vision. The patient has not had a diabetic eye exam in the last 12 months.         Heart Failure:  He presents for follow up of heart failure. He is experiencing shortness of breath with rest and activity, which is slightly worse. He is experiencing lower extremity edema which is same as usual. He denies orthopenea and is not coughing at night. Patient is not checking weight daily. He has recently had a None. He has no side effects from medications.  He has has a medical visit for heart failure 1 time since the last visit.    Hypertension: He presents for follow up of  "hypertension.  He does not check blood pressure  regularly outside of the clinic. Outside blood pressures have been over 140/90. He does not follow a low salt diet.     Vascular Disease:  He presents for follow up of vascular disease.  He never takes nitroglycerin. He takes daily aspirin.    He eats 0-1 servings of fruits and vegetables daily.He consumes 1 sweetened beverage(s) daily.He exercises with enough effort to increase his heart rate 9 or less minutes per day.  He exercises with enough effort to increase his heart rate 3 or less days per week.   He is taking medications regularly.           Review of Systems   Constitutional: Negative.    Eyes: Negative for visual disturbance.   Respiratory: Negative for shortness of breath.    Cardiovascular: Negative for chest pain, palpitations and peripheral edema.   Neurological: Negative for headaches.   Psychiatric/Behavioral: The patient is nervous/anxious.             Objective    BP (!) 126/90 (BP Location: Right arm, Patient Position: Sitting, Cuff Size: Adult Large)   Pulse 75   Temp 98.1  F (36.7  C) (Oral)   Resp 16   Ht 1.778 m (5' 10\")   Wt 95.7 kg (211 lb)   SpO2 97%   BMI 30.28 kg/m    Body mass index is 30.28 kg/m .  Physical Exam  Vitals reviewed.   Eyes:      Conjunctiva/sclera: Conjunctivae normal.   Cardiovascular:      Rate and Rhythm: Normal rate and regular rhythm.      Heart sounds: Normal heart sounds.   Pulmonary:      Effort: Pulmonary effort is normal.      Breath sounds: Normal breath sounds.   Skin:     General: Skin is warm and dry.   Neurological:      Mental Status: He is alert and oriented to person, place, and time.                        Answers for HPI/ROS submitted by the patient on 1/13/2022  If you checked off any problems, how difficult have these problems made it for you to do your work, take care of things at home, or get along with other people?: Not difficult at all  PHQ9 TOTAL SCORE: 6  NABIL 7 TOTAL SCORE: 4      "

## 2022-01-14 LAB
CREAT UR-MCNC: 122 MG/DL
MICROALBUMIN UR-MCNC: 21 MG/L
MICROALBUMIN/CREAT UR: 17.21 MG/G CR (ref 0–17)

## 2022-01-14 RX ORDER — LISINOPRIL 40 MG/1
40 TABLET ORAL DAILY
Qty: 90 TABLET | Refills: 1 | Status: SHIPPED | OUTPATIENT
Start: 2022-01-14 | End: 2022-07-07

## 2022-01-14 ASSESSMENT — ANXIETY QUESTIONNAIRES: GAD7 TOTAL SCORE: 4

## 2022-01-27 ENCOUNTER — VIRTUAL VISIT (OUTPATIENT)
Dept: FAMILY MEDICINE | Facility: CLINIC | Age: 67
End: 2022-01-27
Payer: COMMERCIAL

## 2022-01-27 DIAGNOSIS — I25.10 CORONARY ARTERY DISEASE INVOLVING NATIVE CORONARY ARTERY OF NATIVE HEART WITHOUT ANGINA PECTORIS: ICD-10-CM

## 2022-01-27 DIAGNOSIS — F32.0 MILD MAJOR DEPRESSION (H): Primary | ICD-10-CM

## 2022-01-27 PROCEDURE — 99213 OFFICE O/P EST LOW 20 MIN: CPT | Mod: 95 | Performed by: FAMILY MEDICINE

## 2022-01-27 RX ORDER — ESCITALOPRAM OXALATE 10 MG/1
10 TABLET ORAL DAILY
Qty: 90 TABLET | Refills: 1 | Status: SHIPPED | OUTPATIENT
Start: 2022-01-27 | End: 2022-08-18

## 2022-01-27 RX ORDER — NITROGLYCERIN 0.4 MG/1
0.4 TABLET SUBLINGUAL EVERY 5 MIN PRN
Qty: 20 TABLET | Refills: 1 | Status: SHIPPED | OUTPATIENT
Start: 2022-01-27 | End: 2024-02-08

## 2022-01-27 ASSESSMENT — ENCOUNTER SYMPTOMS
PALPITATIONS: 0
SHORTNESS OF BREATH: 0
HEADACHES: 0
CONSTITUTIONAL NEGATIVE: 1
NERVOUS/ANXIOUS: 0

## 2022-01-27 NOTE — PROGRESS NOTES
Rk is a 66 year old who is being evaluated via a billable video visit.      How would you like to obtain your AVS? MyChart  If the video visit is dropped, the invitation should be resent by: Text to cell phone: [phone]  Will anyone else be joining your video visit? No    Video Start Time: 1628    Assessment and Plan    (F32.0) Mild major depression (H)  (primary encounter diagnosis)  Comment: seeing benefit, will stay in this dose  Plan: escitalopram (LEXAPRO) 10 MG tablet            (I25.10) Coronary artery disease involving native coronary artery of native heart without angina pectoris  Comment: refill  Plan: nitroGLYcerin (NITROSTAT) 0.4 MG sublingual         tablet              RTC in 10w for DM    Darrin Mendez MD      Subjective   Rk is a 66 year old who presents for the following health issues     HPI       Depression and Anxiety Follow-Up    How are you doing with your depression since your last visit? Improved     How are you doing with your anxiety since your last visit?  Improved     Are you having other symptoms that might be associated with depression or anxiety? Yes:  His BP was high and was having panic attack    Have you had a significant life event? No     Do you have any concerns with your use of alcohol or other drugs? No    Social History     Tobacco Use     Smoking status: Former Smoker     Years: 80.00     Types: Cigarettes     Quit date: 9/20/2018     Years since quitting: 3.3     Smokeless tobacco: Never Used   Vaping Use     Vaping Use: Some days     Substances: Nicotine, Flavoring     Devices: Refillable tank   Substance Use Topics     Alcohol use: No     Drug use: Yes     Types: Marijuana     PHQ 7/28/2021 1/13/2022   PHQ-9 Total Score 15 6   Q9: Thoughts of better off dead/self-harm past 2 weeks Not at all Not at all     NABIL-7 SCORE 11/8/2013 7/28/2021 1/13/2022   Total Score 7 - -   Total Score - - 4 (minimal anxiety)   Total Score - 13 4     Last PHQ-9 1/13/2022   1Everette Britton  interest or pleasure in doing things 0   2.  Feeling down, depressed, or hopeless 0   3.  Trouble falling or staying asleep, or sleeping too much 3   4.  Feeling tired or having little energy 1   5.  Poor appetite or overeating 1   6.  Feeling bad about yourself 0   7.  Trouble concentrating 0   8.  Moving slowly or restless 1   Q9: Thoughts of better off dead/self-harm past 2 weeks 0   PHQ-9 Total Score 6     NABIL-7  1/13/2022   1. Feeling nervous, anxious, or on edge 1   2. Not being able to stop or control worrying 0   3. Worrying too much about different things 1   4. Trouble relaxing 1   5. Being so restless that it is hard to sit still 0   6. Becoming easily annoyed or irritable 1   7. Feeling afraid, as if something awful might happen 0   NABIL-7 Total Score 4       Suicide Assessment Five-step Evaluation and Treatment (SAFE-T)      How many servings of fruits and vegetables do you eat daily?  0-1    On average, how many sweetened beverages do you drink each day (Examples: soda, juice, sweet tea, etc.  Do NOT count diet or artificially sweetened beverages)?   0    How many days per week do you exercise enough to make your heart beat faster? 0    How many minutes a day do you exercise enough to make your heart beat faster? 0    How many days per week do you miss taking your medication? 0    Noting mood is better since starting escitalopram.  Did have a panic attack four days ago.  Has also reduced to caffeine to 1 cup in the morning.  Perhaps seeing some issues with erections since starting, but not clearly so.  Otherwise no issues with side effects.    Got the wrong nitroglycerine - spray is quite expensive.  Would like tabs.        Review of Systems   Constitutional: Negative.    Eyes: Negative for visual disturbance.   Respiratory: Negative for shortness of breath.    Cardiovascular: Negative for chest pain, palpitations and peripheral edema.   Neurological: Negative for headaches.   Psychiatric/Behavioral:  The patient is not nervous/anxious.             Objective    Vitals - Patient Reported  Pain Score: No Pain (0)      Vitals:  No vitals were obtained today due to virtual visit.    Physical Exam   GENERAL: Healthy, alert and no distress  EYES: Eyes grossly normal to inspection.  No discharge or erythema, or obvious scleral/conjunctival abnormalities.  RESP: No audible wheeze, cough, or visible cyanosis.  No visible retractions or increased work of breathing.    SKIN: Visible skin clear. No significant rash, abnormal pigmentation or lesions.  NEURO: Cranial nerves grossly intact.  Mentation and speech appropriate for age.  PSYCH: Mentation appears normal, affect normal/bright, judgement and insight intact, normal speech and appearance well-groomed.                Video-Visit Details    Type of service:  Video Visit    Video End Time:4:37 PM    Originating Location (pt. Location): Home    Distant Location (provider location):  [facility] Workday     Platform used for Video Visit: POW

## 2022-02-13 ENCOUNTER — HEALTH MAINTENANCE LETTER (OUTPATIENT)
Age: 67
End: 2022-02-13

## 2022-03-01 DIAGNOSIS — E78.5 HYPERLIPIDEMIA LDL GOAL <100: ICD-10-CM

## 2022-03-01 DIAGNOSIS — E11.65 TYPE 2 DIABETES MELLITUS WITH HYPERGLYCEMIA, WITHOUT LONG-TERM CURRENT USE OF INSULIN (H): ICD-10-CM

## 2022-03-02 RX ORDER — ATORVASTATIN CALCIUM 40 MG/1
TABLET, FILM COATED ORAL
Qty: 90 TABLET | Refills: 3 | OUTPATIENT
Start: 2022-03-02

## 2022-03-02 NOTE — TELEPHONE ENCOUNTER
Medication refused, refilled on 1/13/22 for 6 month fill on Metformin and a year fill on Atorvastatin.

## 2022-04-07 ENCOUNTER — OFFICE VISIT (OUTPATIENT)
Dept: FAMILY MEDICINE | Facility: CLINIC | Age: 67
End: 2022-04-07
Payer: COMMERCIAL

## 2022-04-07 VITALS
WEIGHT: 220 LBS | RESPIRATION RATE: 18 BRPM | DIASTOLIC BLOOD PRESSURE: 100 MMHG | OXYGEN SATURATION: 98 % | BODY MASS INDEX: 31.57 KG/M2 | TEMPERATURE: 98.1 F | SYSTOLIC BLOOD PRESSURE: 172 MMHG | HEART RATE: 54 BPM

## 2022-04-07 DIAGNOSIS — I10 BENIGN ESSENTIAL HYPERTENSION: ICD-10-CM

## 2022-04-07 DIAGNOSIS — Z12.11 SCREEN FOR COLON CANCER: ICD-10-CM

## 2022-04-07 DIAGNOSIS — E11.65 TYPE 2 DIABETES MELLITUS WITH HYPERGLYCEMIA, WITHOUT LONG-TERM CURRENT USE OF INSULIN (H): Primary | ICD-10-CM

## 2022-04-07 DIAGNOSIS — F32.0 MILD MAJOR DEPRESSION (H): ICD-10-CM

## 2022-04-07 DIAGNOSIS — N52.02 CORPORO-VENOUS OCCLUSIVE ERECTILE DYSFUNCTION: ICD-10-CM

## 2022-04-07 DIAGNOSIS — G47.00 INSOMNIA, UNSPECIFIED TYPE: ICD-10-CM

## 2022-04-07 LAB
HBA1C MFR BLD: 7.4 % (ref 0–5.6)
HOLD SPECIMEN: NORMAL
HOLD SPECIMEN: NORMAL

## 2022-04-07 PROCEDURE — 36415 COLL VENOUS BLD VENIPUNCTURE: CPT | Performed by: FAMILY MEDICINE

## 2022-04-07 PROCEDURE — 83036 HEMOGLOBIN GLYCOSYLATED A1C: CPT | Performed by: FAMILY MEDICINE

## 2022-04-07 PROCEDURE — 99214 OFFICE O/P EST MOD 30 MIN: CPT | Performed by: FAMILY MEDICINE

## 2022-04-07 RX ORDER — TRAZODONE HYDROCHLORIDE 50 MG/1
50 TABLET, FILM COATED ORAL AT BEDTIME
Qty: 90 TABLET | Refills: 1 | Status: SHIPPED | OUTPATIENT
Start: 2022-04-07 | End: 2022-05-18

## 2022-04-07 RX ORDER — CALCIUM CARBONATE/VITAMIN D3 500-10/5ML
LIQUID (ML) ORAL
COMMUNITY
End: 2023-01-04

## 2022-04-07 RX ORDER — DAPAGLIFLOZIN 5 MG/1
5 TABLET, FILM COATED ORAL DAILY
Qty: 90 TABLET | Refills: 1 | Status: SHIPPED | OUTPATIENT
Start: 2022-04-07 | End: 2023-01-04

## 2022-04-07 RX ORDER — SILDENAFIL 100 MG/1
100 TABLET, FILM COATED ORAL DAILY PRN
Qty: 20 TABLET | Refills: 1 | Status: SHIPPED | OUTPATIENT
Start: 2022-04-07 | End: 2024-02-08

## 2022-04-07 RX ORDER — CHLORTHALIDONE 25 MG/1
25 TABLET ORAL DAILY
Qty: 30 TABLET | Refills: 1 | Status: SHIPPED | OUTPATIENT
Start: 2022-04-07 | End: 2022-06-07

## 2022-04-07 ASSESSMENT — ENCOUNTER SYMPTOMS
PALPITATIONS: 0
HEADACHES: 0
PARESTHESIAS: 1
CONSTITUTIONAL NEGATIVE: 1
SHORTNESS OF BREATH: 0

## 2022-04-07 ASSESSMENT — PAIN SCALES - GENERAL: PAINLEVEL: NO PAIN (0)

## 2022-04-07 NOTE — PROGRESS NOTES
Assessment and Plan    (E11.65) Type 2 diabetes mellitus with hyperglycemia, without long-term current use of insulin (H)  (primary encounter diagnosis)  Comment: A1c up a bit, will start on lower dose farxiga  Plan: Hemoglobin A1c, dapagliflozin (FARXIGA) 5 MG         TABS tablet            (Z12.11) Screen for colon cancer  Comment:   Plan:     (F32.0) Mild major depression (H)  Comment: stable, no currentmeds  Plan:     (G47.00) Insomnia, unspecified type  Comment:   Plan: traZODone (DESYREL) 50 MG tablet            (I10) Benign essential hypertension  Comment: adding to regemin  Plan: chlorthalidone (HYGROTON) 25 MG tablet            (N52.02) Corporo-venous occlusive erectile dysfunction  Comment:   Plan: sildenafil (VIAGRA) 100 MG tablet              RTC in 2w w/nse for BP check, 3m with me.    Darrin Mendez MD      Dali Beckman is a 66 year old who presents for the following health issues     History of Present Illness       Diabetes:   He presents for follow up of diabetes.  He is checking home blood glucose a few times a month. He checks blood glucose after meals.  Blood glucose is never over 200 and never under 70. He is aware of hypoglycemia symptoms including dizziness. He has no concerns regarding his diabetes at this time.  He is having numbness in feet and excessive thirst. The patient has not had a diabetic eye exam in the last 12 months.         Hypertension: He presents for follow up of hypertension.  He does not check blood pressure  regularly outside of the clinic. Outside blood pressures have been over 140/90. He does not follow a low salt diet.     He eats 0-1 servings of fruits and vegetables daily.He consumes 0 sweetened beverage(s) daily.He exercises with enough effort to increase his heart rate 10 to 19 minutes per day.  He exercises with enough effort to increase his heart rate 7 days per week.   He is taking medications regularly.     Notes that since starting escitalopram has not  been able to have an orgasm.  This is because of difficulty with erections.  Is reluctant to switch meds as they re working so well for his panic attacks.  No recent ativan use.    Is planning on being more active as weather gets better.      Denies CP, palpitations, edema, dyspnea, HA, vision changes.      Review of Systems   Constitutional: Negative.    Eyes: Negative for visual disturbance.   Respiratory: Negative for shortness of breath.    Cardiovascular: Negative for chest pain, palpitations and peripheral edema.   Neurological: Positive for paresthesias. Negative for headaches.            Objective    BP (!) 172/100   Pulse 54   Temp 98.1  F (36.7  C) (Oral)   Resp 18   Wt 99.8 kg (220 lb)   SpO2 98%   BMI 31.57 kg/m    Body mass index is 31.57 kg/m .  Physical Exam

## 2022-05-15 DIAGNOSIS — G47.00 INSOMNIA, UNSPECIFIED TYPE: ICD-10-CM

## 2022-05-18 RX ORDER — TRAZODONE HYDROCHLORIDE 50 MG/1
TABLET, FILM COATED ORAL
Qty: 90 TABLET | Refills: 0 | Status: SHIPPED | OUTPATIENT
Start: 2022-05-18 | End: 2022-10-28

## 2022-06-04 DIAGNOSIS — I10 BENIGN ESSENTIAL HYPERTENSION: ICD-10-CM

## 2022-06-06 NOTE — TELEPHONE ENCOUNTER
Routing refill request to provider for review/approval because:  BP elevated.    BP Readings from Last 3 Encounters:   04/07/22 (!) 172/100   01/13/22 (!) 126/90   07/28/21 (!) 179/105      Maki Piña RN

## 2022-06-07 RX ORDER — CHLORTHALIDONE 25 MG/1
TABLET ORAL
Qty: 30 TABLET | Refills: 1 | Status: SHIPPED | OUTPATIENT
Start: 2022-06-07 | End: 2023-01-04

## 2022-07-31 ENCOUNTER — HEALTH MAINTENANCE LETTER (OUTPATIENT)
Age: 67
End: 2022-07-31

## 2022-08-04 DIAGNOSIS — F32.0 MILD MAJOR DEPRESSION (H): ICD-10-CM

## 2022-08-04 RX ORDER — LORAZEPAM 0.5 MG/1
TABLET ORAL
Qty: 12 TABLET | Refills: 0 | Status: SHIPPED | OUTPATIENT
Start: 2022-08-04 | End: 2023-04-05

## 2022-08-04 NOTE — TELEPHONE ENCOUNTER
Routing refill request to provider for review/approval because:  Drug not on the [facility] refill protocol     Brett GOLDEN RN

## 2022-09-27 DIAGNOSIS — I10 BENIGN ESSENTIAL HYPERTENSION: ICD-10-CM

## 2022-09-27 NOTE — LETTER
October 11, 2022      Rk Longoria  [address]  [facility] [address]        Dear Mr. Rk Longoria,    We are contacting you to notify you that you are due for a Diabetic Follow up.     Appointments can be scheduled via [employer] or by calling [phone] for scheduling assistance.    Thank you for using [facility] for your medical concerns        Sincerely,     Darrin Mendez MD

## 2022-09-28 NOTE — TELEPHONE ENCOUNTER
Routing refill request to provider for review/approval because:  Patient needs to be seen because:  due for diabetic visit.    BP Readings from Last 3 Encounters:   04/07/22 (!) 172/100   01/13/22 (!) 126/90   07/28/21 (!) 179/105        04/07/2022 Darrin Mendez MD Office Visit Return in about 3 months (around 7/7/2022) for Diabetes.     Lulu Matta RN

## 2022-09-29 DIAGNOSIS — I10 BENIGN ESSENTIAL HYPERTENSION: ICD-10-CM

## 2022-09-29 RX ORDER — LISINOPRIL 40 MG/1
TABLET ORAL
Qty: 30 TABLET | Refills: 0 | Status: SHIPPED | OUTPATIENT
Start: 2022-09-29 | End: 2022-10-28

## 2022-09-29 NOTE — TELEPHONE ENCOUNTER
One month fill provided, pt will need f/u appt for ongoing refill.  Please call to schedule DM f/u.    Darrin Mendez MD

## 2022-09-30 RX ORDER — LISINOPRIL 40 MG/1
TABLET ORAL
Qty: 90 TABLET | OUTPATIENT
Start: 2022-09-30

## 2022-09-30 NOTE — TELEPHONE ENCOUNTER
Patient given a short term prescription due to elevated BP and may need medication adjustment. Refusing refill request and closing encounter.     Sruthi Corrigan RN on 9/30/2022 at 10:59 AM

## 2022-10-15 ENCOUNTER — HEALTH MAINTENANCE LETTER (OUTPATIENT)
Age: 67
End: 2022-10-15

## 2022-10-21 ENCOUNTER — HOSPITAL ENCOUNTER (EMERGENCY)
Facility: CLINIC | Age: 67
Discharge: HOME | End: 2022-10-21
Attending: EMERGENCY MEDICINE | Admitting: EMERGENCY MEDICINE
Payer: COMMERCIAL

## 2022-10-21 ENCOUNTER — APPOINTMENT (OUTPATIENT)
Dept: GENERAL RADIOLOGY | Facility: CLINIC | Age: 67
End: 2022-10-21
Attending: EMERGENCY MEDICINE
Payer: COMMERCIAL

## 2022-10-21 VITALS
DIASTOLIC BLOOD PRESSURE: 100 MMHG | OXYGEN SATURATION: 100 % | SYSTOLIC BLOOD PRESSURE: 181 MMHG | HEART RATE: 66 BPM | RESPIRATION RATE: 18 BRPM | TEMPERATURE: 98.4 F

## 2022-10-21 DIAGNOSIS — F41.9 ANXIETY: ICD-10-CM

## 2022-10-21 DIAGNOSIS — I10 HYPERTENSION, UNSPECIFIED TYPE: ICD-10-CM

## 2022-10-21 DIAGNOSIS — F32.0 MILD MAJOR DEPRESSION (H): ICD-10-CM

## 2022-10-21 LAB
ANION GAP SERPL CALCULATED.3IONS-SCNC: 11 MMOL/L (ref 7–15)
BASOPHILS # BLD AUTO: 0 10E3/UL (ref 0–0.2)
BASOPHILS NFR BLD AUTO: 0 %
BUN SERPL-MCNC: 7.4 MG/DL (ref 8–23)
CALCIUM SERPL-MCNC: 9 MG/DL (ref 8.8–10.2)
CHLORIDE SERPL-SCNC: 102 MMOL/L (ref 98–107)
CREAT SERPL-MCNC: 0.79 MG/DL (ref 0.67–1.17)
DEPRECATED HCO3 PLAS-SCNC: 26 MMOL/L (ref 22–29)
EOSINOPHIL # BLD AUTO: 0.1 10E3/UL (ref 0–0.7)
EOSINOPHIL NFR BLD AUTO: 1 %
ERYTHROCYTE [DISTWIDTH] IN BLOOD BY AUTOMATED COUNT: 12.7 % (ref 10–15)
GFR SERPL CREATININE-BSD FRML MDRD: >90 ML/MIN/1.73M2
GLUCOSE SERPL-MCNC: 251 MG/DL (ref 70–99)
HCT VFR BLD AUTO: 43.6 % (ref 40–53)
HGB BLD-MCNC: 14.6 G/DL (ref 13.3–17.7)
HOLD SPECIMEN: NORMAL
IMM GRANULOCYTES # BLD: 0 10E3/UL
IMM GRANULOCYTES NFR BLD: 0 %
LYMPHOCYTES # BLD AUTO: 2.2 10E3/UL (ref 0.8–5.3)
LYMPHOCYTES NFR BLD AUTO: 23 %
MCH RBC QN AUTO: 29 PG (ref 26.5–33)
MCHC RBC AUTO-ENTMCNC: 33.5 G/DL (ref 31.5–36.5)
MCV RBC AUTO: 87 FL (ref 78–100)
MONOCYTES # BLD AUTO: 0.5 10E3/UL (ref 0–1.3)
MONOCYTES NFR BLD AUTO: 5 %
NEUTROPHILS # BLD AUTO: 6.6 10E3/UL (ref 1.6–8.3)
NEUTROPHILS NFR BLD AUTO: 71 %
NRBC # BLD AUTO: 0 10E3/UL
NRBC BLD AUTO-RTO: 0 /100
PLATELET # BLD AUTO: 141 10E3/UL (ref 150–450)
POTASSIUM SERPL-SCNC: 3.7 MMOL/L (ref 3.4–5.3)
RBC # BLD AUTO: 5.04 10E6/UL (ref 4.4–5.9)
SODIUM SERPL-SCNC: 139 MMOL/L (ref 136–145)
TROPONIN T SERPL HS-MCNC: 17 NG/L
WBC # BLD AUTO: 9.4 10E3/UL (ref 4–11)

## 2022-10-21 PROCEDURE — 82310 ASSAY OF CALCIUM: CPT | Performed by: EMERGENCY MEDICINE

## 2022-10-21 PROCEDURE — 93005 ELECTROCARDIOGRAM TRACING: CPT

## 2022-10-21 PROCEDURE — 36415 COLL VENOUS BLD VENIPUNCTURE: CPT | Performed by: EMERGENCY MEDICINE

## 2022-10-21 PROCEDURE — 71046 X-RAY EXAM CHEST 2 VIEWS: CPT

## 2022-10-21 PROCEDURE — 84484 ASSAY OF TROPONIN QUANT: CPT | Performed by: EMERGENCY MEDICINE

## 2022-10-21 PROCEDURE — 250N000013 HC RX MED GY IP 250 OP 250 PS 637: Performed by: EMERGENCY MEDICINE

## 2022-10-21 PROCEDURE — 99285 EMERGENCY DEPT VISIT HI MDM: CPT | Mod: 25

## 2022-10-21 PROCEDURE — 85025 COMPLETE CBC W/AUTO DIFF WBC: CPT | Performed by: EMERGENCY MEDICINE

## 2022-10-21 RX ORDER — ESCITALOPRAM OXALATE 10 MG/1
10 TABLET ORAL DAILY
Qty: 90 TABLET | Refills: 0 | Status: SHIPPED | OUTPATIENT
Start: 2022-10-21 | End: 2023-01-04

## 2022-10-21 RX ORDER — ESCITALOPRAM OXALATE 5 MG/1
10 TABLET ORAL ONCE
Status: COMPLETED | OUTPATIENT
Start: 2022-10-21 | End: 2022-10-21

## 2022-10-21 RX ADMIN — ESCITALOPRAM OXALATE 10 MG: 5 TABLET, FILM COATED ORAL at 19:07

## 2022-10-21 ASSESSMENT — ENCOUNTER SYMPTOMS
NERVOUS/ANXIOUS: 1
RHINORRHEA: 1
PALPITATIONS: 1
SHORTNESS OF BREATH: 1
COUGH: 0

## 2022-10-21 ASSESSMENT — ACTIVITIES OF DAILY LIVING (ADL): ADLS_ACUITY_SCORE: 35

## 2022-10-21 NOTE — ED TRIAGE NOTES
Bp elevated over the last couple of days. Took lisinopril around 2:30 today but BP remains elevated.  Today started having some panic attacks/anxiety. Having Some SOB which is worse than normal. Ran out of Escitalopram 3 days ago. Also reports recent runny nose, cough. COVID test at home negative.   Recent death in the family.

## 2022-10-21 NOTE — ED PROVIDER NOTES
"  History   Chief Complaint:  Hypertension       The history is provided by the patient.      Rk Longoria is a 67 year old male with history of diabetes, hypertension, CAD, and STEMI who presents with hypertension and palpitations. The patient reports that yesterday and today he has had episodes of fast heart rate and panic attacks. He took his blood pressure today and it was 215 systolic, so his son-in-law brought him to the emergency department. He did take his lisinopril this afternoon and confirms taking this daily as prescribed. The patient reports shortness of breath, mostly during his panic attacks. He also mentions that he has a history of long-term smoking and still occasionally vapes. He does not have inhalers at home. The patient mentions that he has not been taking escitalopram for the past 3 days because he ran out and his primary had trouble prescribing this with insurance restrictions. He also notes that he ran out of lorazepam and this was not re-prescribed. The patient also notes rhinorrhea, stating he is \"getting over a cold\" but denies much of a cough.     Review of Systems   HENT: Positive for rhinorrhea.    Respiratory: Positive for shortness of breath. Negative for cough.    Cardiovascular: Positive for palpitations.   Psychiatric/Behavioral: The patient is nervous/anxious.    All other systems reviewed and are negative.      Allergies:  Duloxetine    Medications:  Aspirin  Atorvastatin  Dapagliflozin  Escitalopram  Lisinopril  Lorazepam   Metformin  Nitroglycerin    Past Medical History:     Mild major depression  Tobacco use disorder  Hyperlipidemia  Hypertension   Vitamin D deficiency  Chronic hepatitis C without hepatic coma  BPH  Ascending aorta dilation  Coronary artery disease  Hypertrophic cardiomyopathy  Panlobular emphysema   STEMI  Anemia  Tobacco dependence  COPD  Type 2 diabetes mellitus     Past Surgical History:    Appendectomy  Cardiac catheterization  X2  Cardiac " stenting  Right femur repair     Family History:    Breast cancer  Brain tumor/aneurysm  Myocardial infarction     Social History:  The patient presents to the ED with his son-in-law  Arrived by private vehicle   PCP: Darrin Mendez     Physical Exam     Patient Vitals for the past 24 hrs:   BP Temp Temp src Pulse SpO2   10/21/22 1747 -- 98.4  F (36.9  C) Oral -- --   10/21/22 1743 (!) 207/105 -- -- 84 98 %       Physical Exam  Gen: well appearing, in no acute distress  Oral : Mucous membranes moist,   Nose: No rhinorhea  Ears: External near normal, without drainage  Eyes: periorbital tissues and sclera normal   Neck: supple, no abnormal swelling  Lungs: Clear bilaterally, no tachypnea or distress, speaks full sentences  CV: Regular rate, regular rhythm  Abd: soft, nontender, nondistended, no rebound/guarding  Ext: no lower extremity edema  Skin: warm, dry, well perfused, no rashes/bruising/lesions on exposed skin  Neuro: alert, no gross motor or sensory deficits,   Psych: pleasant mood, normal affect    Emergency Department Course   ECG taken at 1806, read at 1806  ECG results from 10/21/22   EKG 12 lead     Value    Systolic Blood Pressure     Diastolic Blood Pressure     Ventricular Rate 77    Atrial Rate 77    DC Interval 168    QRS Duration 96        QTc 434    P Axis 48    R AXIS -42    T Axis 47    Interpretation ECG      Sinus rhythm  Left axis deviation  Nonspecific ST abnormality  Abnormal ECG  When compared with ECG of 04-MAR-2021 17:57,  No significant change was found       Imaging:  Chest XR,  PA & LAT   Final Result   IMPRESSION: Negative chest.      Report per radiology    Laboratory:  Labs Ordered and Resulted from Time of ED Arrival to Time of ED Departure   BASIC METABOLIC PANEL - Abnormal       Result Value    Sodium 139      Potassium 3.7      Chloride 102      Carbon Dioxide (CO2) 26      Anion Gap 11      Urea Nitrogen 7.4 (*)     Creatinine 0.79      Calcium 9.0      Glucose 251  (*)     GFR Estimate >90     CBC WITH PLATELETS AND DIFFERENTIAL - Abnormal    WBC Count 9.4      RBC Count 5.04      Hemoglobin 14.6      Hematocrit 43.6      MCV 87      MCH 29.0      MCHC 33.5      RDW 12.7      Platelet Count 141 (*)     % Neutrophils 71      % Lymphocytes 23      % Monocytes 5      % Eosinophils 1      % Basophils 0      % Immature Granulocytes 0      NRBCs per 100 WBC 0      Absolute Neutrophils 6.6      Absolute Lymphocytes 2.2      Absolute Monocytes 0.5      Absolute Eosinophils 0.1      Absolute Basophils 0.0      Absolute Immature Granulocytes 0.0      Absolute NRBCs 0.0     TROPONIN T, HIGH SENSITIVITY - Normal    Troponin T, High Sensitivity 17          Emergency Department Course:     Reviewed:  I reviewed nursing notes, vitals, past medical history and Care Everywhere    Assessments:  1815 I obtained history and examined the patient as noted above.   1912 I rechecked the patient and explained findings. At this point I feel that the patient is safe for discharge     Interventions:  1907 escitalopram 10 mg PO    Disposition:  The patient was discharged to home.     Impression & Plan     Medical Decision Making:  Patient presents emergency department with complaints of anxiety panic attacks today been worse over the last 3 days.  He has been M-Escitalopram for quite some time and reports he has not had any in 3 days due to an insurance mixup.  Said the pharmacy says they need a 90-day prescription instead of a 30-day prescription but his doctor keeps writing a 30-day prescription.  Provided his escitalopram in the ED tonight.  He has some hypertension but no evidence of hypertensive emergency.  I suspect his increased stress levels over the past few days and his sudden stoppage of his SSRI are contributing a bit to his symptoms.  He requested several times to have some Ativan.  He certainly does not look like he is in benzodiazepine withdrawal he is not tremulous not tachycardic,  explained to him I did not think that was in his best interest tonight and its best to get him back on his SSRI and I think you will feel better after we do that.  No further concern for ACS, PE or dissection I did not feel he needed any additional work-up for his symptoms other than the single troponin that was done..   Diagnosis:    ICD-10-CM    1. Anxiety  F41.9       2. Hypertension, unspecified type  I10       3. Mild major depression (H)  F32.0 escitalopram (LEXAPRO) 10 MG tablet          Discharge Medications:  Current Discharge Medication List          Scribe Disclosure:  I, Stefanie Chery, am serving as a scribe at 6:13 PM on 10/21/2022 to document services personally performed by Esteban Millan MD based on my observations and the provider's statements to me.        Esteban Millan MD  10/21/22 1927

## 2022-10-24 LAB
ATRIAL RATE - MUSE: 77 BPM
DIASTOLIC BLOOD PRESSURE - MUSE: NORMAL MMHG
INTERPRETATION ECG - MUSE: NORMAL
P AXIS - MUSE: 48 DEGREES
PR INTERVAL - MUSE: 168 MS
QRS DURATION - MUSE: 96 MS
QT - MUSE: 384 MS
QTC - MUSE: 434 MS
R AXIS - MUSE: -42 DEGREES
SYSTOLIC BLOOD PRESSURE - MUSE: NORMAL MMHG
T AXIS - MUSE: 47 DEGREES
VENTRICULAR RATE- MUSE: 77 BPM

## 2022-10-28 DIAGNOSIS — G47.00 INSOMNIA, UNSPECIFIED TYPE: ICD-10-CM

## 2022-10-28 DIAGNOSIS — I10 BENIGN ESSENTIAL HYPERTENSION: ICD-10-CM

## 2022-10-28 RX ORDER — LISINOPRIL 40 MG/1
TABLET ORAL
Qty: 30 TABLET | Refills: 0 | Status: SHIPPED | OUTPATIENT
Start: 2022-10-28 | End: 2022-11-29

## 2022-10-28 RX ORDER — TRAZODONE HYDROCHLORIDE 50 MG/1
50 TABLET, FILM COATED ORAL AT BEDTIME
Qty: 30 TABLET | Refills: 0 | Status: SHIPPED | OUTPATIENT
Start: 2022-10-28 | End: 2022-12-02

## 2022-10-28 NOTE — TELEPHONE ENCOUNTER
Routing refill request to provider for review/approval because:  Drug interaction warningDrug-Drug: escitalopram and traZODone  Lesli Lora RN

## 2022-10-28 NOTE — TELEPHONE ENCOUNTER
Please call patient. Sent 30 days. He is overdue for visit with Dr. Mendez. Also, looks like Dr. Mendez is primary care provider now so future refills should be sent to him.    Naldo Jones PA-C on 10/28/2022 at 2:21 PM

## 2022-10-28 NOTE — TELEPHONE ENCOUNTER
One month fill provided, pt will need f/u appt for ongoing refill.  Please call to schedule CPE.    Darrin Mendez MD

## 2022-10-28 NOTE — TELEPHONE ENCOUNTER
Routing refill request to provider for review/approval because:  Labs out of range:  BP    BP Readings from Last 3 Encounters:   10/21/22 (!) 181/100   04/07/22 (!) 172/100   01/13/22 (!) 126/90     Adeola WING RN, BSN

## 2022-11-01 DIAGNOSIS — I10 BENIGN ESSENTIAL HYPERTENSION: ICD-10-CM

## 2022-11-02 RX ORDER — LISINOPRIL 40 MG/1
TABLET ORAL
Qty: 90 TABLET | Refills: 0 | OUTPATIENT
Start: 2022-11-02

## 2022-12-03 ENCOUNTER — HEALTH MAINTENANCE LETTER (OUTPATIENT)
Age: 67
End: 2022-12-03

## 2023-01-04 ENCOUNTER — VIRTUAL VISIT (OUTPATIENT)
Dept: INTERNAL MEDICINE | Facility: CLINIC | Age: 68
End: 2023-01-04
Payer: COMMERCIAL

## 2023-01-04 DIAGNOSIS — Z12.11 SCREEN FOR COLON CANCER: ICD-10-CM

## 2023-01-04 DIAGNOSIS — J06.9 UPPER RESPIRATORY TRACT INFECTION, UNSPECIFIED TYPE: Primary | ICD-10-CM

## 2023-01-04 DIAGNOSIS — Z12.5 SCREENING PSA (PROSTATE SPECIFIC ANTIGEN): ICD-10-CM

## 2023-01-04 DIAGNOSIS — F32.0 MILD MAJOR DEPRESSION (H): ICD-10-CM

## 2023-01-04 DIAGNOSIS — Z00.00 ROUTINE GENERAL MEDICAL EXAMINATION AT A HEALTH CARE FACILITY: ICD-10-CM

## 2023-01-04 DIAGNOSIS — E11.65 TYPE 2 DIABETES MELLITUS WITH HYPERGLYCEMIA, WITHOUT LONG-TERM CURRENT USE OF INSULIN (H): ICD-10-CM

## 2023-01-04 PROCEDURE — 99213 OFFICE O/P EST LOW 20 MIN: CPT | Mod: 95

## 2023-01-04 RX ORDER — AZITHROMYCIN 250 MG/1
TABLET, FILM COATED ORAL
Qty: 6 TABLET | Refills: 0 | Status: SHIPPED | OUTPATIENT
Start: 2023-01-04 | End: 2023-01-09

## 2023-01-04 RX ORDER — ESCITALOPRAM OXALATE 10 MG/1
10 TABLET ORAL DAILY
Qty: 90 TABLET | Refills: 1 | Status: SHIPPED | OUTPATIENT
Start: 2023-01-04 | End: 2023-07-13

## 2023-01-04 ASSESSMENT — PATIENT HEALTH QUESTIONNAIRE - PHQ9
SUM OF ALL RESPONSES TO PHQ QUESTIONS 1-9: 4
10. IF YOU CHECKED OFF ANY PROBLEMS, HOW DIFFICULT HAVE THESE PROBLEMS MADE IT FOR YOU TO DO YOUR WORK, TAKE CARE OF THINGS AT HOME, OR GET ALONG WITH OTHER PEOPLE: NOT DIFFICULT AT ALL
SUM OF ALL RESPONSES TO PHQ QUESTIONS 1-9: 4

## 2023-01-04 NOTE — PROGRESS NOTES
"Rk is a 67 year old who is being evaluated via a billable video visit.      How would you like to obtain your AVS? MyChart  If the video visit is dropped, the invitation should be resent by: Text to cell phone: [phone]  Will anyone else be joining your video visit? Yes: Wife-Yuliana. How would they like to receive their invitation? Text to cell phone: [phone]    Assessment & Plan     Upper respiratory tract infection, unspecified type  Patient has had ongoing symptoms for almost 3 months at this point. His partner is also experiencing  He does experience some SOB when walking up stairs. He is able to talk in sentences and does not appear to be in distress during video visit. He does has a cardiac history but denies chest pain and SOB at rest at this time. I think that it would be appropriate to give him abx for now with follow up in clinic. If he does become more short of breath he should go to UC or ER  - azithromycin (ZITHROMAX) 250 MG tablet; Take 2 tablets (500 mg) by mouth daily for 1 day, THEN 1 tablet (250 mg) daily for 4 days.    Type 2 diabetes mellitus with hyperglycemia, without long-term current use of insulin (H)  Eye exam ordered- noticed decreasing vision  - Adult Eye  Referral; Future    Mild major depression (H)  Re-order  - escitalopram (LEXAPRO) 10 MG tablet; Take 1 tablet (10 mg) by mouth daily    Screening PSA (prostate specific antigen)  Order for upcoming appointment with me  - PSA, screen; Future    Routine general medical examination at a health care facility  Order to be completed before upcoming appointment with me   - Lipid panel reflex to direct LDL Fasting; Future  - Comprehensive metabolic panel (BMP + Alb, Alk Phos, ALT, AST, Total. Bili, TP); Future  - CBC with platelets; Future    BMI:   Estimated body mass index is 31.57 kg/m  as calculated from the following:    Height as of 1/13/22: 1.778 m (5' 10\").    Weight as of 4/7/22: 99.8 kg (220 lb).     No follow-ups on " file.    Jasper Solano NP  [facility] DAVINA Beckman is a 67 year old accompanied by his self, presenting for the following health issues:  Recheck Medication      History of Present Illness       Diabetes:   He presents for follow up of diabetes.  He is checking home blood glucose a few times a month. He checks blood glucose before meals.  Blood glucose is never over 200 and never under 70. He is aware of hypoglycemia symptoms including dizziness and lethargy. He has no concerns regarding his diabetes at this time.  He is having numbness in feet, burning in feet and blurry vision. The patient has not had a diabetic eye exam in the last 12 months.         Hypertension: He presents for follow up of hypertension.  He does not check blood pressure  regularly outside of the clinic. Outside blood pressures have been over 140/90. He does not follow a low salt diet.     Vascular Disease:  He presents for follow up of vascular disease.  He is not taking daily aspirin.     Today's PHQ-9         PHQ-9 Total Score: 4    PHQ-9 Q9 Thoughts of better off dead/self-harm past 2 weeks :   Not at all    How difficult have these problems made it for you to do your work, take care of things at home, or get along with other people: Not difficult at all       Diabetes Follow-up      How often are you checking your blood sugar? Not at all    What concerns do you have today about your diabetes? None     Do you have any of these symptoms? (Select all that apply)  Numbness in feet, Excessive thirst and Blurry vision    Have you had a diabetic eye exam in the last 12 months? No        BP Readings from Last 2 Encounters:   10/21/22 (!) 181/100   04/07/22 (!) 172/100     Hemoglobin A1C (%)   Date Value   04/07/2022 7.4 (H)   01/13/2022 8.1 (H)   09/19/2018 5.6   02/16/2018 5.4     LDL Cholesterol Calculated (mg/dL)   Date Value   01/13/2022 52   09/21/2018 91   11/03/2017 78       Hyperlipidemia  Follow-Up      Are you regularly taking any medication or supplement to lower your cholesterol?   Yes- Atoravastatin    Are you having muscle aches or other side effects that you think could be caused by your cholesterol lowering medication?  No, has not take cholesterol for a while.    Hypertension Follow-up      Do you check your blood pressure regularly outside of the clinic? No     Are you following a low salt diet? No    Are your blood pressures ever more than 140 on the top number (systolic) OR more   than 90 on the bottom number (diastolic), for example 140/90? Yes      How many servings of fruits and vegetables do you eat daily?  2-3 a week.    On average, how many sweetened beverages do you drink each day (Examples: soda, juice, sweet tea, etc.  Do NOT count diet or artificially sweetened beverages)?   0    How many days per week do you exercise enough to make your heart beat faster? 3 or less    How many minutes a day do you exercise enough to make your heart beat faster? 9 or less  How many days per week do you miss taking your medication? Ran out of Lexapro and ended up in the [facility].    He has a viral infection in his lungs for three months. He would like an antibiotic to treat that.    Patient states that he has had viral illness that has lasted 3 months. He is coughing up a brownish/green color. He was a smoking- stopped 6-7 years ago.     Patient is having SOB. When he is going up the stairs he feels more winded than before. Wife has had this respiratory infection as well.     Patient not taking most of medication at this time.     Review of Systems   Constitutional, HEENT, cardiovascular, pulmonary, GI, , musculoskeletal, neuro, skin, endocrine and psych systems are negative, except as otherwise noted.      Objective    Vitals - Patient Reported  Weight (Patient Reported): 97.5 kg (215 lb)    Vitals:  No vitals were obtained today due to virtual visit.    Physical Exam    GENERAL: alert and no distress  EYES: Eyes grossly normal to inspection.  No discharge or erythema, or obvious scleral/conjunctival abnormalities.  RESP: No audible wheeze, cough, or visible cyanosis.  No visible retractions or increased work of breathing.    SKIN: Visible skin clear. No significant rash, abnormal pigmentation or lesions.  NEURO: Cranial nerves grossly intact.  Mentation and speech appropriate for age.  PSYCH: Mentation appears normal, affect normal/bright, judgement and insight intact, normal speech and appearance well-groomed.  Video-Visit Details    Type of service:  Video Visit   Video Start Time: 1:33 PM  Video End Time:1:56 PM    Originating Location (pt. Location): Home  Distant Location (provider location):  On-site  Platform used for Video Visit: Olivia

## 2023-02-01 ENCOUNTER — OFFICE VISIT (OUTPATIENT)
Dept: INTERNAL MEDICINE | Facility: CLINIC | Age: 68
End: 2023-02-01
Payer: COMMERCIAL

## 2023-02-01 VITALS
DIASTOLIC BLOOD PRESSURE: 90 MMHG | WEIGHT: 215.3 LBS | HEART RATE: 80 BPM | BODY MASS INDEX: 29.16 KG/M2 | OXYGEN SATURATION: 96 % | HEIGHT: 72 IN | SYSTOLIC BLOOD PRESSURE: 195 MMHG | TEMPERATURE: 98.7 F | RESPIRATION RATE: 20 BRPM

## 2023-02-01 DIAGNOSIS — I10 BENIGN ESSENTIAL HYPERTENSION: ICD-10-CM

## 2023-02-01 DIAGNOSIS — F41.0 ANXIETY ATTACK: ICD-10-CM

## 2023-02-01 DIAGNOSIS — Z00.00 ENCOUNTER FOR MEDICARE ANNUAL WELLNESS EXAM: Primary | ICD-10-CM

## 2023-02-01 DIAGNOSIS — Z13.6 ENCOUNTER FOR ABDOMINAL AORTIC ANEURYSM (AAA) SCREENING: ICD-10-CM

## 2023-02-01 DIAGNOSIS — N40.1 BENIGN PROSTATIC HYPERPLASIA WITH WEAK URINARY STREAM: ICD-10-CM

## 2023-02-01 DIAGNOSIS — Z12.5 SCREENING PSA (PROSTATE SPECIFIC ANTIGEN): ICD-10-CM

## 2023-02-01 DIAGNOSIS — E11.65 TYPE 2 DIABETES MELLITUS WITH HYPERGLYCEMIA, WITHOUT LONG-TERM CURRENT USE OF INSULIN (H): ICD-10-CM

## 2023-02-01 DIAGNOSIS — I77.810 ASCENDING AORTA DILATATION (H): ICD-10-CM

## 2023-02-01 DIAGNOSIS — G47.00 INSOMNIA, UNSPECIFIED TYPE: ICD-10-CM

## 2023-02-01 DIAGNOSIS — B18.2 CHRONIC HEPATITIS C WITHOUT HEPATIC COMA (H): ICD-10-CM

## 2023-02-01 DIAGNOSIS — R06.02 SOB (SHORTNESS OF BREATH): ICD-10-CM

## 2023-02-01 DIAGNOSIS — R39.12 BENIGN PROSTATIC HYPERPLASIA WITH WEAK URINARY STREAM: ICD-10-CM

## 2023-02-01 DIAGNOSIS — E78.5 HYPERLIPIDEMIA LDL GOAL <100: ICD-10-CM

## 2023-02-01 DIAGNOSIS — I42.2 HYPERTROPHIC CARDIOMYOPATHY (H): ICD-10-CM

## 2023-02-01 DIAGNOSIS — Z12.11 SCREEN FOR COLON CANCER: ICD-10-CM

## 2023-02-01 LAB
ERYTHROCYTE [DISTWIDTH] IN BLOOD BY AUTOMATED COUNT: 13.3 % (ref 10–15)
HBA1C MFR BLD: 9.3 % (ref 0–5.6)
HCT VFR BLD AUTO: 43.3 % (ref 40–53)
HGB BLD-MCNC: 14.8 G/DL (ref 13.3–17.7)
MCH RBC QN AUTO: 29.4 PG (ref 26.5–33)
MCHC RBC AUTO-ENTMCNC: 34.2 G/DL (ref 31.5–36.5)
MCV RBC AUTO: 86 FL (ref 78–100)
PLATELET # BLD AUTO: 158 10E3/UL (ref 150–450)
RBC # BLD AUTO: 5.03 10E6/UL (ref 4.4–5.9)
WBC # BLD AUTO: 7.9 10E3/UL (ref 4–11)

## 2023-02-01 PROCEDURE — G0103 PSA SCREENING: HCPCS

## 2023-02-01 PROCEDURE — 80061 LIPID PANEL: CPT

## 2023-02-01 PROCEDURE — 82043 UR ALBUMIN QUANTITATIVE: CPT

## 2023-02-01 PROCEDURE — 83036 HEMOGLOBIN GLYCOSYLATED A1C: CPT

## 2023-02-01 PROCEDURE — 87902 NFCT AGT GNTYP ALYS HEP C: CPT

## 2023-02-01 PROCEDURE — 86803 HEPATITIS C AB TEST: CPT

## 2023-02-01 PROCEDURE — G0438 PPPS, INITIAL VISIT: HCPCS

## 2023-02-01 PROCEDURE — 36415 COLL VENOUS BLD VENIPUNCTURE: CPT

## 2023-02-01 PROCEDURE — 82570 ASSAY OF URINE CREATININE: CPT

## 2023-02-01 PROCEDURE — 80053 COMPREHEN METABOLIC PANEL: CPT

## 2023-02-01 PROCEDURE — 99214 OFFICE O/P EST MOD 30 MIN: CPT | Mod: 25

## 2023-02-01 PROCEDURE — 85027 COMPLETE CBC AUTOMATED: CPT

## 2023-02-01 RX ORDER — ATORVASTATIN CALCIUM 40 MG/1
40 TABLET, FILM COATED ORAL DAILY
Qty: 90 TABLET | Refills: 0 | Status: SHIPPED | OUTPATIENT
Start: 2023-02-01 | End: 2023-06-02

## 2023-02-01 RX ORDER — TRAZODONE HYDROCHLORIDE 50 MG/1
50 TABLET, FILM COATED ORAL AT BEDTIME
Qty: 90 TABLET | Refills: 0 | Status: SHIPPED | OUTPATIENT
Start: 2023-02-01 | End: 2023-06-10

## 2023-02-01 RX ORDER — AMLODIPINE BESYLATE 5 MG/1
5 TABLET ORAL DAILY
Qty: 90 TABLET | Refills: 1 | Status: SHIPPED | OUTPATIENT
Start: 2023-02-01 | End: 2023-07-13

## 2023-02-01 RX ORDER — TAMSULOSIN HYDROCHLORIDE 0.4 MG/1
0.4 CAPSULE ORAL DAILY
Qty: 90 CAPSULE | Refills: 1 | Status: SHIPPED | OUTPATIENT
Start: 2023-02-01 | End: 2023-11-02

## 2023-02-01 RX ORDER — LORAZEPAM 0.5 MG/1
0.5 TABLET ORAL EVERY 6 HOURS PRN
Qty: 12 TABLET | Refills: 0 | Status: SHIPPED | OUTPATIENT
Start: 2023-02-01 | End: 2023-08-02

## 2023-02-01 RX ORDER — LISINOPRIL 40 MG/1
40 TABLET ORAL DAILY
Qty: 90 TABLET | Refills: 0 | Status: SHIPPED | OUTPATIENT
Start: 2023-02-01 | End: 2023-06-05

## 2023-02-01 ASSESSMENT — ENCOUNTER SYMPTOMS
FEVER: 0
WEAKNESS: 0
HEARTBURN: 0
FREQUENCY: 1
DIARRHEA: 0
SORE THROAT: 0
JOINT SWELLING: 0
HEMATURIA: 0
ABDOMINAL PAIN: 0
HEMATOCHEZIA: 0
PALPITATIONS: 0
DYSURIA: 0
EYE PAIN: 0
CHILLS: 0
CONSTIPATION: 0
COUGH: 0
ARTHRALGIAS: 1
DIZZINESS: 0
MYALGIAS: 1
NAUSEA: 0
HEADACHES: 0
PARESTHESIAS: 0
SHORTNESS OF BREATH: 1
NERVOUS/ANXIOUS: 0

## 2023-02-01 ASSESSMENT — ACTIVITIES OF DAILY LIVING (ADL): CURRENT_FUNCTION: NO ASSISTANCE NEEDED

## 2023-02-01 NOTE — PATIENT INSTRUCTIONS
Check BP at home- your BP goal is less than 140/90. If it is above this in a month we will need to add more medication.     Follow up with US of heart, and cardiology.       Patient Education   Personalized Prevention Plan  You are due for the preventive services outlined below.  Your care team is available to assist you in scheduling these services.  If you have already completed any of these items, please share that information with your care team to update in your medical record.  Health Maintenance Due   Topic Date Due    Breathing Capacity Test  Never done    COPD Action Plan  Never done    Depression Action Plan  Never done    Eye Exam  Never done    Colorectal Cancer Screening  Never done    Zoster (Shingles) Vaccine (1 of 2) Never done    Hepatitis B Vaccine (1 of 3 - Risk 3-dose series) Never done    Talk to your care team about options to quit tobacco use.  11/03/2018    Discuss Advance Care Planning  01/08/2019    Diabetic Foot Exam  02/16/2019    LUNG CANCER SCREENING  11/26/2019    Annual Wellness Visit  07/20/2020    AORTIC ANEURYSM SCREENING (SYSTEM ASSIGNED)  Never done    A1C Lab  07/07/2022    Cholesterol Lab  01/13/2023    Kidney Microalbumin Urine Test  01/13/2023        Patient Education   Personalized Prevention Plan  You are due for the preventive services outlined below.  Your care team is available to assist you in scheduling these services.  If you have already completed any of these items, please share that information with your care team to update in your medical record.  Health Maintenance Due   Topic Date Due    Breathing Capacity Test  Never done    COPD Action Plan  Never done    Depression Action Plan  Never done    Eye Exam  Never done    Colorectal Cancer Screening  Never done    Zoster (Shingles) Vaccine (1 of 2) Never done    Hepatitis B Vaccine (1 of 3 - Risk 3-dose series) Never done    Talk to your care team about options to quit tobacco use.  11/03/2018    Diabetic Foot Exam   02/16/2019    LUNG CANCER SCREENING  11/26/2019    Annual Wellness Visit  07/20/2020    AORTIC ANEURYSM SCREENING (SYSTEM ASSIGNED)  Never done    A1C Lab  07/07/2022    Cholesterol Lab  01/13/2023    Kidney Microalbumin Urine Test  01/13/2023

## 2023-02-01 NOTE — PROGRESS NOTES
"SUBJECTIVE:   Rk is a 67 year old who presents for Preventive Visit.  Patient has been advised of split billing requirements and indicates understanding: Yes  Are you in the first 12 months of your Medicare coverage?  No    Healthy Habits:     In general, how would you rate your overall health?  Good    Frequency of exercise:  None    Do you usually eat at least 4 servings of fruit and vegetables a day, include whole grains    & fiber and avoid regularly eating high fat or \"junk\" foods?  No    Taking medications regularly:  Yes    Medication side effects:  Muscle aches    Ability to successfully perform activities of daily living:  No assistance needed    Home Safety:  No safety concerns identified    Hearing Impairment:  No hearing concerns    In the past 6 months, have you been bothered by leaking of urine? Yes    In general, how would you rate your overall mental or emotional health?  Good      PHQ-2 Total Score: 0    Additional concerns today:  Yes      Have you ever done Advance Care Planning? (For example, a Health Directive, POLST, or a discussion with a medical provider or your loved ones about your wishes): No, advance care planning information given to patient to review.  Patient declined advance care planning discussion at this time.      Fall risk  Fallen 2 or more times in the past year?: No  Any fall with injury in the past year?: No    Cognitive Screening   1) Repeat 3 items (Leader, Season, Table)    2) Clock draw: NORMAL  3) 3 item recall: Recalls 2 objects   Results: NORMAL clock, 1-2 items recalled: COGNITIVE IMPAIRMENT LESS LIKELY    Mini-CogTM Copyright ALFONZO Chow. Licensed by the author for use in [facility]; reprinted with permission ([email or web address]). All rights reserved.      Do you have sleep apnea, excessive snoring or daytime drowsiness?: no    Reviewed and updated as needed this visit by clinical staff   Tobacco  Allergies  Meds  Problems  Med Hx  Surg Hx  Fam Hx  Soc "   Hx        Reviewed and updated as needed this visit by Provider   Tobacco     Med Hx    Soc Hx       Social History     Tobacco Use     Smoking status: Former     Years: 80.00     Types: Cigarettes     Quit date: 2018     Years since quittin.3     Smokeless tobacco: Never   Substance Use Topics     Alcohol use: Not Currently     If you drink alcohol do you typically have >3 drinks per day or >7 drinks per week? No    Alcohol Use 2023   Prescreen: >3 drinks/day or >7 drinks/week? Not Applicable   Prescreen: >3 drinks/day or >7 drinks/week? -   No flowsheet data found.      Current providers sharing in care for this patient include:   Patient Care Team:  Darrin Mendez MD as PCP - General (Family Practice)  Darrin Mendez MD as Assigned PCP  Bhavna Mays OD as MD (Ophthalmology)  Jasper Solano NP as Referring Physician (Nurse Practitioner Primary Care)    The following health maintenance items are reviewed in Epic and correct as of today:  Health Maintenance   Topic Date Due     SPIROMETRY  Never done     COPD ACTION PLAN  Never done     DEPRESSION ACTION PLAN  Never done     EYE EXAM  Never done     COLORECTAL CANCER SCREENING  Never done     ZOSTER IMMUNIZATION (1 of 2) Never done     HEPATITIS B IMMUNIZATION (1 of 3 - Risk 3-dose series) Never done     NICOTINE/TOBACCO CESSATION COUNSELING Q 1 YR  2018     DIABETIC FOOT EXAM  2019     LUNG CANCER SCREENING  2019     MEDICARE ANNUAL WELLNESS VISIT  2020     AORTIC ANEURYSM SCREENING (SYSTEM ASSIGNED)  Never done     A1C  2022     LIPID  2023     MICROALBUMIN  2023     ANNUAL REVIEW OF HM ORDERS  2023     PHQ-9  2023     BMP  10/21/2023     Pneumococcal Vaccine: 65+ Years (3 - PPSV23 if available, else PCV20) 10/30/2023     DTAP/TDAP/TD IMMUNIZATION (2 - Td or Tdap) 2024     FALL RISK ASSESSMENT  2024     ADVANCE CARE PLANNING  2028     INFLUENZA  "VACCINE  Completed     COVID-19 Vaccine  Completed     IPV IMMUNIZATION  Aged Out     MENINGITIS IMMUNIZATION  Aged Out       Review of Systems   Constitutional: Negative for chills and fever.   HENT: Negative for congestion, ear pain, hearing loss and sore throat.    Eyes: Positive for visual disturbance. Negative for pain.   Respiratory: Positive for shortness of breath. Negative for cough.    Cardiovascular: Negative for chest pain, palpitations and peripheral edema.   Gastrointestinal: Negative for abdominal pain, constipation, diarrhea, heartburn, hematochezia and nausea.   Genitourinary: Positive for frequency and urgency. Negative for dysuria, genital sores, hematuria, impotence and penile discharge.   Musculoskeletal: Positive for arthralgias and myalgias. Negative for joint swelling.   Skin: Negative for rash.   Neurological: Negative for dizziness, weakness, headaches and paresthesias.   Psychiatric/Behavioral: Negative for mood changes. The patient is not nervous/anxious.      Taking 1,000mg metformin- he does get some diarrhea if he does not eat    Visual changes are progressive- have eye appointment schedule    SOB- going up steps, denies chest pain- declines XR     BP elevated (180s)     Sober for 15 years    OBJECTIVE:   BP (!) 195/90   Pulse 80   Temp 98.7  F (37.1  C) (Oral)   Resp 20   Ht 1.816 m (5' 11.5\")   Wt 97.7 kg (215 lb 4.8 oz)   SpO2 96%   BMI 29.61 kg/m   Estimated body mass index is 29.61 kg/m  as calculated from the following:    Height as of this encounter: 1.816 m (5' 11.5\").    Weight as of this encounter: 97.7 kg (215 lb 4.8 oz).  Physical Exam  GENERAL: alert and no distress  EYES: Eyes grossly normal to inspection, PERRL and conjunctivae and sclerae normal  HENT: ear canals and TM's normal, nose and mouth without ulcers or lesions  NECK: no adenopathy, no asymmetry, masses, or scars and thyroid normal to palpation  RESP: lungs clear to auscultation - no rales, rhonchi or " wheezes  CV: regular rate and rhythm, normal S1 S2, no S3 or S4,  Murmur noted, click or rub, no peripheral edema and peripheral pulses strong  ABDOMEN: soft, nontender, no hepatosplenomegaly, no masses and bowel sounds normal  MS: no gross musculoskeletal defects noted, no edema  SKIN: no suspicious lesions or rashes  NEURO: Normal strength and tone, mentation intact and speech normal  PSYCH: mentation appears normal, affect normal/bright    Diagnostic Test Results:  Labs reviewed in Epic    ASSESSMENT / PLAN:   (Z00.00) Encounter for Medicare annual wellness exam  (primary encounter diagnosis)  Comment: physical with additional concerns  Plan: Comprehensive metabolic panel (BMP + Alb, Alk         Phos, ALT, AST, Total. Bili, TP), CBC with         platelets            (B18.2) Chronic hepatitis C without hepatic coma (H)  Comment: Patient's previous hepatitis C testing was positive at [facility] beds at the [facility] but there was no follow-up made.  We will repeat testing, and refer to GI  Plan: Hepatitis C Screen Reflex to HCV RNA Quant and         Genotype            (E11.65) Type 2 diabetes mellitus with hyperglycemia, without long-term current use of insulin (H)  Comment: Patient's current A1c is 9.3 on 1000 mg of metformin daily.  Patient is hesitant to take blood sugars at home and therefore I am hesitant to be aggressive at managing his blood glucose.  I will recommend that he begin taking 2000 mg of metformin and 2.5 mg of glipizide.  Plan: HEMOGLOBIN A1C, Albumin Random Urine         Quantitative with Creat Ratio            (I42.2) Hypertrophic cardiomyopathy (H)  Comment: Noted in chart review, patient has not followed up with cardiology recently.  He does have some shortness of breath but declined EKG, chest x-ray and stress test at today's appointment.  Patient advised that he should schedule with cardiology and that they can order additional testing.  I did place an order for an echo during today's visit which patient  should complete before cardiology appointment      (I77.810) Ascending aorta dilatation (H)  Comment: Ascending aorta measured 4.2 cm at right pulmonary arterial origin.  Will order echo, and asked that patient follow-up with cardiology for ongoing monitoring.  Last scan was in 2018  Plan: Echocardiogram Complete, Adult Cardiology Eval          Referral            (R06.02) SOB (shortness of breath)  Comment: I told patient that I would like to order EKG chest x-ray and possible stress test for him.  He declined.  He states that he becomes short of breath with exertion- he does feel much better after being treated with a Z-Nigel earlier this month.      (Z12.5) Screening PSA (prostate specific antigen)  Comment: Screening  Plan: PSA, screen            (N40.1,  R39.12) Benign prostatic hyperplasia with weak urinary stream  Comment: Patient states that he has frequent urination, and weak stream.  We will have him do trial of Flomax to see if this improves symptoms  Plan: tamsulosin (FLOMAX) 0.4 MG capsule            (I10) Benign essential hypertension  Comment: Patient's BP extremely elevated in clinic today.  Of note he does has not taken his lisinopril today.  I will add 5 mg of amlodipine to her regimen.  It is difficult to say if he will need additional therapy given that he has not taken his lisinopril today, we will asked patient to come back in 1 month for recheck  Plan: amLODIPine (NORVASC) 5 MG tablet, lisinopril         (ZESTRIL) 40 MG tablet            (E78.5) Hyperlipidemia LDL goal <100  Comment: Reorder atorvastatin, check lipid panel  Plan: atorvastatin (LIPITOR) 40 MG tablet, Lipid         panel reflex to direct LDL Non-fasting            (Z12.11) Screen for colon cancer  Comment: Screening  Plan: Colonoscopy Screening  Referral            (G47.00) Insomnia, unspecified type  Comment: Reorder medication  Plan: traZODone (DESYREL) 50 MG tablet            (F41.0) Anxiety attack  Comment:  "Patient uses very infrequently.  12 tablets of Ativan sent for patient, and he understands that this should be used 2-3 times monthly at max.  Plan: LORazepam (ATIVAN) 0.5 MG tablet          Order AAA for screening    BMI:   Estimated body mass index is 29.61 kg/m  as calculated from the following:    Height as of this encounter: 1.816 m (5' 11.5\").    Weight as of this encounter: 97.7 kg (215 lb 4.8 oz).       He reports that he quit smoking about 4 years ago. His smoking use included cigarettes. He has never used smokeless tobacco.      Appropriate preventive services were discussed with this patient, including applicable screening as appropriate for cardiovascular disease, diabetes, osteopenia/osteoporosis, and glaucoma.  As appropriate for age/gender, discussed screening for colorectal cancer, prostate cancer, breast cancer, and cervical cancer. Checklist reviewing preventive services available has been given to the patient.    Reviewed patients plan of care and provided an AVS. The Complex Care Plan (for patients with higher acuity and needing more deliberate coordination of services) for Rk meets the Care Plan requirement. This Care Plan has been established and reviewed with the Patient.    Jasper Solano NP  [facility]    Identified Health Risks:  "

## 2023-02-01 NOTE — LETTER
February 3, 2023      Krcarleen Cunninghamzer  [address]  [place] [address]        Dear ,    We are writing to inform you of your test results.    Labs are back-     Your hemoglobin A1c is 9.3- I recommend increase metformin, and starting a medication called glipizide daily, as well as taking your blood sugar in the morning before you eat to make sure it is not going too low (less than 70).     Your hepatitis C is positive again-I have put in a GI consult as they will  monitor and treat this.     There is some protein in your urine but you are on the medication that we use to treat this.     We did not talk about it at your appointment but there is an abdominal aorta screening that would be recommended for you as well. I have placed the order they should call.        Resulted Orders   HEMOGLOBIN A1C   Result Value Ref Range    Hemoglobin A1C 9.3 (H) 0.0 - 5.6 %      Comment:      Normal <5.7%   Prediabetes 5.7-6.4%    Diabetes 6.5% or higher     Note: Adopted from ADA consensus guidelines.    Narrative    Results confirmed by repeat testing    Albumin Random Urine Quantitative with Creat Ratio   Result Value Ref Range    Creatinine Urine mg/dL 88.3 mg/dL      Comment:      The reference ranges have not been established in urine creatinine. The results should be integrated into the clinical context for interpretation.    Albumin Urine mg/L 145.0 mg/L      Comment:      The reference ranges have not been established in urine albumin. The results should be integrated into the clinical context for interpretation.    Albumin Urine mg/g Cr 164.21 (H) 0.00 - 17.00 mg/g Cr      Comment:      Microalbuminuria is defined as an albumin:creatinine ratio of 17 to 299 for males and 25 to 299 for females. A ratio of albumin:creatinine of 300 or higher is indicative of overt proteinuria.  Due to biologic variability, positive results should be confirmed by a second, first-morning random or 24-hour timed urine specimen. If  there is discrepancy, a third specimen is recommended. When 2 out of 3 results are in the microalbuminuria range, this is evidence for incipient nephropathy and warrants increased efforts at glucose control, blood pressure control, and institution of therapy with an angiotensin-converting-enzyme (ACE) inhibitor (if the patient can tolerate it).     Comprehensive metabolic panel (BMP + Alb, Alk Phos, ALT, AST, Total. Bili, TP)   Result Value Ref Range    Sodium 138 136 - 145 mmol/L    Potassium 4.1 3.4 - 5.3 mmol/L    Chloride 101 98 - 107 mmol/L    Carbon Dioxide (CO2) 27 22 - 29 mmol/L    Anion Gap 10 7 - 15 mmol/L    Urea Nitrogen 16.2 8.0 - 23.0 mg/dL    Creatinine 0.71 0.67 - 1.17 mg/dL    Calcium 9.1 8.8 - 10.2 mg/dL    Glucose 210 (H) 70 - 99 mg/dL    Alkaline Phosphatase 59 40 - 129 U/L    AST 39 10 - 50 U/L    ALT 31 10 - 50 U/L    Protein Total 7.2 6.4 - 8.3 g/dL    Albumin 4.4 3.5 - 5.2 g/dL    Bilirubin Total 0.7 <=1.2 mg/dL    GFR Estimate >90 >60 mL/min/1.73m2      Comment:      eGFR calculated using 2021 CKD-EPI equation.   CBC with platelets   Result Value Ref Range    WBC Count 7.9 4.0 - 11.0 10e3/uL    RBC Count 5.03 4.40 - 5.90 10e6/uL    Hemoglobin 14.8 13.3 - 17.7 g/dL    Hematocrit 43.3 40.0 - 53.0 %    MCV 86 78 - 100 fL    MCH 29.4 26.5 - 33.0 pg    MCHC 34.2 31.5 - 36.5 g/dL    RDW 13.3 10.0 - 15.0 %    Platelet Count 158 150 - 450 10e3/uL   PSA, screen   Result Value Ref Range    Prostate Specific Antigen Screen 3.62 0.00 - 4.50 ng/mL    Narrative    This result is obtained using the Roche Elecsys total PSA method on the rohini e801 immunoassay analyzer. Results obtained with different assay methods or kits cannot be used interchangeably.   Hepatitis C Screen Reflex to HCV RNA Quant and Genotype   Result Value Ref Range    Hepatitis C Antibody Reactive (A) Nonreactive      Comment:      A reactive result indicates one of the following   1) Current HCV infection   2) Past HCV infection that  has resolved or   3) False positivity.     The CDC recommends that a reactive result should be followed by Nucleic acid testing for HCV RNA. If HCV RNA is detected, that indicates current HCV infection.   If HCV RNA is not detected, that indicates either past, resolved HCV infection, or false HCV antibody positivity.    Narrative    Assay performance characteristics have not been established for newborns, infants, and children.   Lipid panel reflex to direct LDL Non-fasting   Result Value Ref Range    Cholesterol 140 <200 mg/dL    Triglycerides 137 <150 mg/dL    Direct Measure HDL 40 >=40 mg/dL    LDL Cholesterol Calculated 73 <=100 mg/dL    Non HDL Cholesterol 100 <130 mg/dL    Narrative    Cholesterol  Desirable:  <200 mg/dL    Triglycerides  Normal:  Less than 150 mg/dL  Borderline High:  150-199 mg/dL  High:  200-499 mg/dL  Very High:  Greater than or equal to 500 mg/dL    Direct Measure HDL  Female:  Greater than or equal to 50 mg/dL   Male:  Greater than or equal to 40 mg/dL    LDL Cholesterol  Desirable:  <100mg/dL  Above Desirable:  100-129 mg/dL   Borderline High:  130-159 mg/dL   High:  160-189 mg/dL   Very High:  >= 190 mg/dL    Non HDL Cholesterol  Desirable:  130 mg/dL  Above Desirable:  130-159 mg/dL  Borderline High:  160-189 mg/dL  High:  190-219 mg/dL  Very High:  Greater than or equal to 220 mg/dL       If you have any questions or concerns, please call the clinic at the number listed above.       Sincerely,      Jasper Solano NP

## 2023-02-02 LAB
ALBUMIN SERPL BCG-MCNC: 4.4 G/DL (ref 3.5–5.2)
ALP SERPL-CCNC: 59 U/L (ref 40–129)
ALT SERPL W P-5'-P-CCNC: 31 U/L (ref 10–50)
ANION GAP SERPL CALCULATED.3IONS-SCNC: 10 MMOL/L (ref 7–15)
AST SERPL W P-5'-P-CCNC: 39 U/L (ref 10–50)
BILIRUB SERPL-MCNC: 0.7 MG/DL
BUN SERPL-MCNC: 16.2 MG/DL (ref 8–23)
CALCIUM SERPL-MCNC: 9.1 MG/DL (ref 8.8–10.2)
CHLORIDE SERPL-SCNC: 101 MMOL/L (ref 98–107)
CHOLEST SERPL-MCNC: 140 MG/DL
CREAT SERPL-MCNC: 0.71 MG/DL (ref 0.67–1.17)
CREAT UR-MCNC: 88.3 MG/DL
DEPRECATED HCO3 PLAS-SCNC: 27 MMOL/L (ref 22–29)
GFR SERPL CREATININE-BSD FRML MDRD: >90 ML/MIN/1.73M2
GLUCOSE SERPL-MCNC: 210 MG/DL (ref 70–99)
HDLC SERPL-MCNC: 40 MG/DL
LDLC SERPL CALC-MCNC: 73 MG/DL
MICROALBUMIN UR-MCNC: 145 MG/L
MICROALBUMIN/CREAT UR: 164.21 MG/G CR (ref 0–17)
NONHDLC SERPL-MCNC: 100 MG/DL
POTASSIUM SERPL-SCNC: 4.1 MMOL/L (ref 3.4–5.3)
PROT SERPL-MCNC: 7.2 G/DL (ref 6.4–8.3)
PSA SERPL-MCNC: 3.62 NG/ML (ref 0–4.5)
SODIUM SERPL-SCNC: 138 MMOL/L (ref 136–145)
TRIGL SERPL-MCNC: 137 MG/DL

## 2023-02-03 LAB — HCV AB SERPL QL IA: REACTIVE

## 2023-02-03 RX ORDER — GLIPIZIDE 2.5 MG/1
2.5 TABLET, EXTENDED RELEASE ORAL DAILY
Qty: 90 TABLET | Refills: 0 | Status: SHIPPED | OUTPATIENT
Start: 2023-02-03 | End: 2023-05-03

## 2023-02-06 LAB
HCV RNA SERPL NAA+PROBE-ACNC: ABNORMAL IU/ML
HCV RNA SERPL NAA+PROBE-LOG IU: 5.5 {LOG_IU}/ML

## 2023-04-05 DIAGNOSIS — F32.0 MILD MAJOR DEPRESSION (H): ICD-10-CM

## 2023-04-05 RX ORDER — LORAZEPAM 0.5 MG/1
0.5 TABLET ORAL EVERY 6 HOURS PRN
Qty: 12 TABLET | Refills: 0 | Status: SHIPPED | OUTPATIENT
Start: 2023-04-05 | End: 2023-06-28

## 2023-04-05 NOTE — TELEPHONE ENCOUNTER
Routing refill request to provider for review/approval because:  Drug not on the [facility] refill protocol     Different prescribing provider

## 2023-06-01 ENCOUNTER — TRANSFERRED RECORDS (OUTPATIENT)
Dept: MULTI SPECIALTY CLINIC | Facility: CLINIC | Age: 68
End: 2023-06-01

## 2023-06-01 DIAGNOSIS — I10 BENIGN ESSENTIAL HYPERTENSION: ICD-10-CM

## 2023-06-01 DIAGNOSIS — E78.5 HYPERLIPIDEMIA LDL GOAL <100: ICD-10-CM

## 2023-06-01 LAB — RETINOPATHY: NORMAL

## 2023-06-02 RX ORDER — ATORVASTATIN CALCIUM 40 MG/1
40 TABLET, FILM COATED ORAL DAILY
Qty: 90 TABLET | Refills: 2 | Status: SHIPPED | OUTPATIENT
Start: 2023-06-02 | End: 2024-02-28

## 2023-06-03 NOTE — TELEPHONE ENCOUNTER
Pending Prescriptions:                       Disp   Refills    lisinopril (ZESTRIL) 40 MG tablet          90 tab*0        Sig: Take 1 tablet (40 mg) by mouth daily    Routing refill request to provider for review/approval because:  LDL Cholesterol Calculated   Date Value Ref Range Status   02/01/2023 73 <=100 mg/dL Final   09/21/2018 91 <100 mg/dL Final     Comment:     Desirable:       <100 mg/dl

## 2023-06-03 NOTE — TELEPHONE ENCOUNTER
Pending Prescriptions:                       Disp   Refills    atorvastatin (LIPITOR) 40 MG tablet       90 tab*2            Sig: Take 1 tablet (40 mg) by mouth daily    Prescription approved per [facility] Refill Protocol.

## 2023-06-05 RX ORDER — LISINOPRIL 40 MG/1
40 TABLET ORAL DAILY
Qty: 90 TABLET | Refills: 1 | Status: SHIPPED | OUTPATIENT
Start: 2023-06-05 | End: 2023-11-24

## 2023-06-09 DIAGNOSIS — G47.00 INSOMNIA, UNSPECIFIED TYPE: ICD-10-CM

## 2023-06-11 NOTE — TELEPHONE ENCOUNTER
Pending Prescriptions:                       Disp   Refills    traZODone (DESYREL) 50 MG tablet           90 tab*1        Sig: Take 1 tablet (50 mg) by mouth At Bedtime    Routing refill request to provider for review/approval because:  Drug interaction warning

## 2023-06-14 RX ORDER — TRAZODONE HYDROCHLORIDE 50 MG/1
50 TABLET, FILM COATED ORAL AT BEDTIME
Qty: 90 TABLET | Refills: 1 | Status: SHIPPED | OUTPATIENT
Start: 2023-06-14 | End: 2023-11-02

## 2023-06-23 DIAGNOSIS — F32.0 MILD MAJOR DEPRESSION (H): ICD-10-CM

## 2023-06-23 NOTE — TELEPHONE ENCOUNTER
Refill request for Lorazepam     Last refill on 4/5/23 for #12    Routing refill request to provider for review/approval because:  Drug not on the [facility] refill protocol

## 2023-06-28 DIAGNOSIS — F41.0 ANXIETY ATTACK: ICD-10-CM

## 2023-06-28 RX ORDER — LORAZEPAM 0.5 MG/1
0.5 TABLET ORAL EVERY 6 HOURS PRN
Qty: 12 TABLET | Refills: 0 | Status: SHIPPED | OUTPATIENT
Start: 2023-06-28 | End: 2023-08-02

## 2023-06-28 RX ORDER — LORAZEPAM 0.5 MG/1
0.5 TABLET ORAL EVERY 6 HOURS PRN
Qty: 12 TABLET | Refills: 0 | OUTPATIENT
Start: 2023-06-28

## 2023-06-28 NOTE — TELEPHONE ENCOUNTER
Duplicate request. Refill addressed through 6/23/23 Refill enconter. Request denied.    Ninoska Stevenson RN  [facility]

## 2023-07-12 DIAGNOSIS — F32.0 MILD MAJOR DEPRESSION (H): ICD-10-CM

## 2023-07-12 DIAGNOSIS — I10 BENIGN ESSENTIAL HYPERTENSION: ICD-10-CM

## 2023-07-12 NOTE — TELEPHONE ENCOUNTER
Requested Prescriptions   Pending Prescriptions Disp Refills     amLODIPine (NORVASC) 5 MG tablet  Last Written Prescription Date:  02/01/23  Last Fill Quantity: 90,  # refills: 1   Last office visit: 2/1/2023 ; last virtual visit: 1/4/2023 with prescribing provider:  02/01/23   Future Office Visit:   Next 5 appointments (look out 90 days)    Aug 02, 2023  2:30 PM  (Arrive by 2:10 PM)  Provider Visit with Jasper Solano NP  [facility] ([facility] ) [address]  [address]  [facility] [address]  [phone]           escitalopram (LEXAPRO) 10 MG tablet  Last Written Prescription Date:  01/04/23  Last Fill Quantity: 90,  # refills: 1   Last office visit: 2/1/2023 ; last virtual visit: 1/4/2023 with prescribing provider:  02/01/23   Future Office Visit:   Next 5 appointments (look out 90 days)    Aug 02, 2023  2:30 PM  (Arrive by 2:10 PM)  Provider Visit with Jasper Solano NP  [facility] ([facility] ) [address]  [address]  [facility] [address]  [phone]          90 tablet 1     Sig: Take 1 tablet (5 mg) by mouth daily       There is no refill protocol information for this order

## 2023-07-12 NOTE — TELEPHONE ENCOUNTER
Brief chart review.    Appears that patient has been seeing Jasper Solano most recently with follow up scheduled. Will route to new PCP.    Justen Schwab MD  [facility]  7/12/2023

## 2023-07-12 NOTE — TELEPHONE ENCOUNTER
Routing refill request to provider for review/approval because:  BP elevated.    BP Readings from Last 3 Encounters:   02/01/23 (!) 195/90   10/21/22 (!) 181/100   04/07/22 (!) 172/100     Thank you,  Ariel Snow, Triage RN [facility]  1:21 PM 7/12/2023

## 2023-07-13 RX ORDER — AMLODIPINE BESYLATE 5 MG/1
5 TABLET ORAL DAILY
Qty: 30 TABLET | Refills: 0 | Status: SHIPPED | OUTPATIENT
Start: 2023-07-13 | End: 2023-08-02

## 2023-07-13 RX ORDER — ESCITALOPRAM OXALATE 10 MG/1
10 TABLET ORAL DAILY
Qty: 30 TABLET | Refills: 0 | Status: SHIPPED | OUTPATIENT
Start: 2023-07-13 | End: 2023-08-02

## 2023-08-02 ENCOUNTER — OFFICE VISIT (OUTPATIENT)
Dept: INTERNAL MEDICINE | Facility: CLINIC | Age: 68
End: 2023-08-02
Payer: COMMERCIAL

## 2023-08-02 VITALS
DIASTOLIC BLOOD PRESSURE: 80 MMHG | WEIGHT: 225.3 LBS | HEIGHT: 72 IN | OXYGEN SATURATION: 94 % | SYSTOLIC BLOOD PRESSURE: 150 MMHG | RESPIRATION RATE: 14 BRPM | TEMPERATURE: 98.6 F | HEART RATE: 95 BPM | BODY MASS INDEX: 30.51 KG/M2

## 2023-08-02 DIAGNOSIS — Z79.899 CONTROLLED SUBSTANCE AGREEMENT SIGNED: ICD-10-CM

## 2023-08-02 DIAGNOSIS — B18.2 CHRONIC HEPATITIS C WITHOUT HEPATIC COMA (H): ICD-10-CM

## 2023-08-02 DIAGNOSIS — F32.0 MILD MAJOR DEPRESSION (H): ICD-10-CM

## 2023-08-02 DIAGNOSIS — I10 BENIGN ESSENTIAL HYPERTENSION: ICD-10-CM

## 2023-08-02 DIAGNOSIS — M79.10 MUSCLE ACHE: ICD-10-CM

## 2023-08-02 DIAGNOSIS — I25.10 CORONARY ARTERY DISEASE INVOLVING NATIVE CORONARY ARTERY OF NATIVE HEART WITHOUT ANGINA PECTORIS: ICD-10-CM

## 2023-08-02 DIAGNOSIS — E11.65 TYPE 2 DIABETES MELLITUS WITH HYPERGLYCEMIA, WITHOUT LONG-TERM CURRENT USE OF INSULIN (H): Primary | ICD-10-CM

## 2023-08-02 LAB
ALBUMIN SERPL BCG-MCNC: 4.6 G/DL (ref 3.5–5.2)
ALP SERPL-CCNC: 66 U/L (ref 40–129)
ALT SERPL W P-5'-P-CCNC: 26 U/L (ref 0–70)
AMPHETAMINES UR QL: NOT DETECTED
ANION GAP SERPL CALCULATED.3IONS-SCNC: 11 MMOL/L (ref 7–15)
AST SERPL W P-5'-P-CCNC: 35 U/L (ref 0–45)
BARBITURATES UR QL SCN: NOT DETECTED
BENZODIAZ UR QL SCN: NOT DETECTED
BILIRUB SERPL-MCNC: 0.8 MG/DL
BUN SERPL-MCNC: 9 MG/DL (ref 8–23)
BUPRENORPHINE UR QL: NOT DETECTED
CALCIUM SERPL-MCNC: 9.8 MG/DL (ref 8.8–10.2)
CANNABINOIDS UR QL: DETECTED
CHLORIDE SERPL-SCNC: 102 MMOL/L (ref 98–107)
CK SERPL-CCNC: 68 U/L (ref 39–308)
COCAINE UR QL SCN: NOT DETECTED
CREAT SERPL-MCNC: 0.85 MG/DL (ref 0.67–1.17)
D-METHAMPHET UR QL: NOT DETECTED
DEPRECATED HCO3 PLAS-SCNC: 26 MMOL/L (ref 22–29)
GFR SERPL CREATININE-BSD FRML MDRD: >90 ML/MIN/1.73M2
GLUCOSE SERPL-MCNC: 187 MG/DL (ref 70–99)
HBA1C MFR BLD: 7.6 % (ref 0–5.6)
METHADONE UR QL SCN: NOT DETECTED
OPIATES UR QL SCN: NOT DETECTED
OXYCODONE UR QL SCN: NOT DETECTED
PCP UR QL SCN: NOT DETECTED
POTASSIUM SERPL-SCNC: 4.7 MMOL/L (ref 3.4–5.3)
PROPOXYPH UR QL: NOT DETECTED
PROT SERPL-MCNC: 7.9 G/DL (ref 6.4–8.3)
SODIUM SERPL-SCNC: 139 MMOL/L (ref 136–145)
TRICYCLICS UR QL SCN: NOT DETECTED

## 2023-08-02 PROCEDURE — 82550 ASSAY OF CK (CPK): CPT

## 2023-08-02 PROCEDURE — 99214 OFFICE O/P EST MOD 30 MIN: CPT

## 2023-08-02 PROCEDURE — 80053 COMPREHEN METABOLIC PANEL: CPT

## 2023-08-02 PROCEDURE — 83036 HEMOGLOBIN GLYCOSYLATED A1C: CPT

## 2023-08-02 PROCEDURE — 80306 DRUG TEST PRSMV INSTRMNT: CPT

## 2023-08-02 PROCEDURE — 36415 COLL VENOUS BLD VENIPUNCTURE: CPT

## 2023-08-02 RX ORDER — ESCITALOPRAM OXALATE 10 MG/1
10 TABLET ORAL DAILY
Qty: 30 TABLET | Refills: 0 | Status: SHIPPED | OUTPATIENT
Start: 2023-08-02 | End: 2023-08-16

## 2023-08-02 RX ORDER — LORAZEPAM 0.5 MG/1
0.5 TABLET ORAL EVERY 6 HOURS PRN
Qty: 12 TABLET | Refills: 0 | Status: SHIPPED | OUTPATIENT
Start: 2023-08-02 | End: 2023-09-18

## 2023-08-02 RX ORDER — AMLODIPINE BESYLATE 10 MG/1
10 TABLET ORAL DAILY
Qty: 30 TABLET | Refills: 1 | Status: SHIPPED | OUTPATIENT
Start: 2023-08-02 | End: 2023-08-16

## 2023-08-02 ASSESSMENT — PATIENT HEALTH QUESTIONNAIRE - PHQ9
SUM OF ALL RESPONSES TO PHQ QUESTIONS 1-9: 0
10. IF YOU CHECKED OFF ANY PROBLEMS, HOW DIFFICULT HAVE THESE PROBLEMS MADE IT FOR YOU TO DO YOUR WORK, TAKE CARE OF THINGS AT HOME, OR GET ALONG WITH OTHER PEOPLE: NOT DIFFICULT AT ALL
SUM OF ALL RESPONSES TO PHQ QUESTIONS 1-9: 0

## 2023-08-02 NOTE — PROGRESS NOTES
Assessment & Plan     Type 2 diabetes mellitus with hyperglycemia, without long-term current use of insulin (H)  A1c resulted at 7.6. will increase glipizide to 5mg total per day and follow up in 3 months  - HEMOGLOBIN A1C; Future  - Comprehensive metabolic panel (BMP + Alb, Alk Phos, ALT, AST, Total. Bili, TP); Future  - Primary Care - Care Coordination Referral; Future  - HEMOGLOBIN A1C  - Comprehensive metabolic panel (BMP + Alb, Alk Phos, ALT, AST, Total. Bili, TP)    Chronic hepatitis C without hepatic coma (H)  Patient encouraged to meet with gastroenterology.  He is has not done this in the past.  Referral is in for patient to meet.  Patient states that cost is a barrier-care coordination to referral placed to help with insurance/cost    Coronary artery disease involving native coronary artery of native heart without angina pectoris  Would like patient to follow up with cardiology. Order is in.   - Comprehensive metabolic panel (BMP + Alb, Alk Phos, ALT, AST, Total. Bili, TP); Future  - Primary Care - Care Coordination Referral; Future  - Comprehensive metabolic panel (BMP + Alb, Alk Phos, ALT, AST, Total. Bili, TP)    Benign essential hypertension  Increase amlodipine to 10mg  - amLODIPine (NORVASC) 10 MG tablet; Take 1 tablet (10 mg) by mouth daily  - Comprehensive metabolic panel (BMP + Alb, Alk Phos, ALT, AST, Total. Bili, TP); Future  - Primary Care - Care Coordination Referral; Future  - Comprehensive metabolic panel (BMP + Alb, Alk Phos, ALT, AST, Total. Bili, TP)    Mild major depression (H)    - escitalopram (LEXAPRO) 10 MG tablet; Take 1 tablet (10 mg) by mouth daily  - LORazepam (ATIVAN) 0.5 MG tablet; Take 1 tablet (0.5 mg) by mouth every 6 hours as needed for anxiety    Muscle ache  Check to see if related to statin  - CK total; Future  - CK total    Controlled substance agreement signed  Patient admites to use of marijuana. OK if drug screen positive for marijuana  - Urine Drugs of Abuse  "Screen; Future  - Urine Drugs of Abuse Screen    BMI:   Estimated body mass index is 30.99 kg/m  as calculated from the following:    Height as of this encounter: 1.816 m (5' 11.5\").    Weight as of this encounter: 102.2 kg (225 lb 4.8 oz).     Jasper Solano NP  [facility] DAVINA Beckman is a 68 year old, presenting for the following health issues:  Hypertension (Follow up.)      8/2/2023     2:06 PM   Additional Questions   Roomed by Joan Field MA   Accompanied by Himself       History of Present Illness       Reason for visit:  Follow up    He eats 2-3 servings of fruits and vegetables daily.He consumes 0 sweetened beverage(s) daily.He exercises with enough effort to increase his heart rate 30 to 60 minutes per day.  He exercises with enough effort to increase his heart rate 4 days per week.   He is taking medications regularly.       Diabetes Follow-up    How often are you checking your blood sugar? Not at all  What concerns do you have today about your diabetes? None   Do you have any of these symptoms? (Select all that apply)  Numbness in feet, Blurry vision, and Weight gain  Have you had a diabetic eye exam in the last 12 months? No      Hyperlipidemia Follow-Up    Are you regularly taking any medication or supplement to lower your cholesterol?   Yes- Atorvasatin  Are you having muscle aches or other side effects that you think could be caused by your cholesterol lowering medication?  Yes- He has muscle aches and it is hard to tell if it is from the medication    Hypertension Follow-up    Do you check your blood pressure regularly outside of the clinic? No   Are you following a low salt diet? No  Are your blood pressures ever more than 140 on the top number (systolic) OR more   than 90 on the bottom number (diastolic), for example 140/90? Yes    BP Readings from Last 2 Encounters:   08/02/23 (!) 150/94   02/01/23 (!) 195/90     Hemoglobin A1C (%)   Date Value   02/01/2023 " "9.3 (H)   04/07/2022 7.4 (H)   09/19/2018 5.6   02/16/2018 5.4     LDL Cholesterol Calculated (mg/dL)   Date Value   02/01/2023 73   01/13/2022 52   09/21/2018 91   11/03/2017 78     Has not taken blood pressure at home- occasionally at [facility]; it is always elevated      Review of Systems   Constitutional, HEENT, cardiovascular, pulmonary, gi and gu systems are negative, except as otherwise noted.      Objective    BP (!) 150/94 (BP Location: Left arm, Patient Position: Sitting, Cuff Size: Adult Large)   Pulse 95   Temp 98.6  F (37  C) (Oral)   Resp 14   Ht 1.816 m (5' 11.5\")   Wt 102.2 kg (225 lb 4.8 oz)   SpO2 94%   BMI 30.99 kg/m    Body mass index is 30.99 kg/m .  Physical Exam   GENERAL: alert and no distress  EYES: Eyes grossly normal to inspection  HENT: nose and mouth without ulcers or lesions  NECK: no adenopathy, no asymmetry, masses, or scars and thyroid normal to palpation  RESP: lungs clear to auscultation - no rales, rhonchi or wheezes  CV: regular rate and rhythm, normal S1 S2, no S3 or S4, no murmur, click or rub, no peripheral edema and peripheral pulses strong  MS: no gross musculoskeletal defects noted, no edema  SKIN: no suspicious lesions or rashes  NEURO: Normal strength and tone, mentation intact and speech normal  PSYCH: mentation appears normal, affect normal/bright              "

## 2023-08-02 NOTE — PATIENT INSTRUCTIONS
Follow up in 3 months    We want blood pressure less than 140/90    Check with insurance and GI, and cardiology consult. Us of heart order is in as well, and AAA screening

## 2023-08-02 NOTE — LETTER
[facility]  08/02/23  Patient: Rk Longoria  YOB: 1955  Medical Record Number: [id number]                                                                                  Non-Opioid Controlled Substance Agreement    This is an agreement between you and your provider regarding safe and appropriate use of controlled substances prescribed by your care team. Controlled substances are?medicines that can cause physical and mental dependence (abuse).     There are strict laws about having and using these medicines. We here at [facility] are  committed to working with you in your efforts to get better. To support you in this work, we'll help you schedule regular office appointments for medicine refills. If we must cancel or change your appointment for any reason, we'll make sure you have enough medicine to last until your next appointment.     As a Provider, I will:   Listen carefully to your concerns while treating you with respect.   Recommend a treatment plan that I believe is in your best interest and may involve therapies other than medicine.    Talk with you often about the possible benefits and the risk of harm of any medicine that we prescribe for you.  Assess the safety of this medicine and check how well it works.    Provide a plan on how to taper (discontinue or go off) using this medicine if the decision is made to stop its use.      ::  As a Patient, I understand controlled substances:     Are prescribed by my care provider to help me function or work and manage my condition(s).?  Are strong medicines and can cause serious side effects.     Need to be taken exactly as prescribed.?Combining controlled substances with certain medicines or chemicals (such as illegal drugs, alcohol, sedatives, sleeping pills, and benzodiazepines) can be dangerous or even fatal.? If I stop taking my medicines suddenly, I may have severe withdrawal symptoms.     The risks, benefits,  and side effects of these medicine(s) were explained to me. I agree that:    I will take part in other treatments as advised by my care team. This may be psychiatry or counseling, physical therapy, behavioral therapy, group treatment or a referral to specialist.    I will keep all my appointments and understand this is part of the monitoring of controlled substances.?My care team may require an office visit for EVERY controlled substance refill. If I miss appointments or don t follow instructions, my care team may stop my medicine    I will take my medicines as prescribed. I will not change the dose or schedule unless my care team tells me to. There will be no refills if I run out early.      I may be asked to come to the clinic and complete a urine drug test or complete a pill count. If I don t give a urine sample or participate in a pill count, the care team may stop my medicine.    I will only receive controlled substance prescriptions from this clinic. If I am treated by another provider, I will tell them that I am taking controlled substances and that I have a treatment agreement with this provider. I will inform my [facility] care team within one business day if I am given a prescription for any controlled substance by another healthcare provider. My [facility] care team can contact other providers and pharmacists about my use of any medicines.    It is up to me to make sure that I don't run out of my medicines on weekends or holidays.?If my care team is willing to refill my prescription without a visit, I must request refills only during office hours. Refills may take up to 3 business days to process. I will use one pharmacy to fill all my controlled substance prescriptions. I will notify the clinic about any changes to my insurance or medicine availability.    I am responsible for my prescriptions. If the medicine/prescription is lost, stolen or destroyed, it will not be replaced.?I also agree  not to share controlled substance medicines with anyone.     I am aware I should not use any illegal or recreational drugs. I agree not to drink alcohol unless my care team says I can.     If I enroll in the [place] Medical Cannabis program, I will tell my care team before my next refill.    I will tell my care team right away if I become pregnant, have a new medical problem treated outside of my regular clinic, or have a change in my medicines.     I understand that this medicine can affect my thinking, judgment and reaction time.? Alcohol and drugs affect the brain and body, which can affect the safety of my driving. Being under the influence of alcohol or drugs can affect my decision-making, behaviors, personal safety and the safety of others. Driving while impaired (DWI) can occur if a person is driving, operating or in physical control of a car, motorcycle, boat, snowmobile, ATV, motorbike, off-road vehicle or any other motor vehicle ([place] Statute 169A.20). I understand the risk if I choose to drive or operate any vehicle or machinery.    I understand that if I do not follow any of the conditions above, my prescriptions or treatment may be stopped or changed.   I agree that my provider, clinic care team and pharmacy may work with any city, state or federal law enforcement agency that investigates the misuse, sale or other diversion of my controlled medicine. I will allow my provider to discuss my care with, or share a copy of, this agreement with any other treating provider, pharmacy or emergency room where I receive care.     I have read this agreement and have asked questions about anything I did not understand.    ________________________________________________________  Patient Signature - Rk Longoria     ___________________                   Date     ________________________________________________________  Provider Signature - Jasper Solano, NP       ___________________                   Date      ________________________________________________________  Witness Signature (required if provider not present while patient signing)          ___________________                   Date

## 2023-08-03 ENCOUNTER — PATIENT OUTREACH (OUTPATIENT)
Dept: CARE COORDINATION | Facility: CLINIC | Age: 68
End: 2023-08-03
Payer: COMMERCIAL

## 2023-08-03 RX ORDER — GLIPIZIDE 5 MG/1
5 TABLET, FILM COATED, EXTENDED RELEASE ORAL DAILY
Qty: 90 TABLET | Refills: 0 | Status: SHIPPED | OUTPATIENT
Start: 2023-08-03 | End: 2023-11-24

## 2023-08-03 NOTE — PROGRESS NOTES
Clinic Care Coordination Contact  [facility]/Voicemail       Clinical Data: Care Coordinator Outreach  Outreach attempted x 1.  Left message on patient's voicemail with call back information and requested return call.    Plan: Care Coordinator will try to reach patient again in 1-2 business days.      Moni BRYAN Public Health  Community Health Worker  [facility]  Clinic Care Coordination  [phone]

## 2023-08-07 NOTE — PROGRESS NOTES
Clinic Care Coordination Contact  Community Health Worker Initial Outreach    CHW Initial Information Gathering:  Referral Source: PCP  Current living arrangement:: I live in a private home with spouse  Type of residence:: Town home  Community Resources: None (Continues to go to  for community support - over a decade sober)  Supplies Currently Used at Home: Diabetic Supplies (Blood pressure monitor - thinks he is not using it correctly.)  Equipment Currently Used at Home: none  Informal Support system:: Spouse, Family, Children (feels as though he has a good support system. He feels healthy, socially active.)  No PCP office visit in Past Year: No  Transportation means:: Regular car, Friend, Other (Wife drives him often. Sometimes scooters to [place] and the gas station. States his eye sight is not what it used to be.)       Patient accepts CC: Yes. Patient scheduled for assessment with CC RN on 8/9 at 10:00 AM. Patient noted desire to discuss Connecting with specialties.     8-7, CHW:  CHW was able to connect with the Patient and introduce self/care coordination and intent of call.   The Patient would like to connect with specialties, however he does not want to rack up medical bills.   Patient states colonoscopies are too expensive. Writer educated that a colonoscopy would be covered by his insurance as it is a preventative screening. Patient would also like to connect with gastroenterology about his Hep C.   Patient needs education about how to find in-network specialists.   The Patient had no other questions or concerns at this time.       Moni BRYAN Public Health  Community Health Worker  [place] [place] [facility]  Clinic Care Coordination  [phone]

## 2023-08-09 ENCOUNTER — APPOINTMENT (OUTPATIENT)
Dept: NURSING | Facility: CLINIC | Age: 68
End: 2023-08-09
Payer: COMMERCIAL

## 2023-08-09 ENCOUNTER — PATIENT OUTREACH (OUTPATIENT)
Dept: CARE COORDINATION | Facility: CLINIC | Age: 68
End: 2023-08-09

## 2023-08-09 NOTE — PROGRESS NOTES
Clinic Care Coordination Contact  Clinic Care Coordination Contact  OUTREACH    Referral Information: RN CC contacted patient for scheduled assessment to complete enrollment in Care Coordination. Patient stated he only wanted guidance for getting specialty providers within network for his insurance. RN CC discussed how to go about that. He also stated that he is considering re-establishing with another [facility] clinic in [place]. That clinic is covered by another Care Coordination team.     Plan: Patient is agreeable to receiving an introduction letter for Care Coordination via [employer]. Verified his address as accurate. RN CC will close the program at this time.     Addendum: RN CC printed introduction letter with additional information about Care Coordination while at clinic on Thursday, 8/10/23 and mailed to patient.     Reta Moncada RN, BSN, CPHN, CM  [facility] Ambulatory Care Management  [facility]  Phone: [phone]  Email: [email or web address]

## 2023-08-09 NOTE — LETTER
[facility] CARE COORDINATION  [address] [place] [address]    August 9, 2023    Rk Longoria  [address] [place] [address]    Dear Rk,    I am a  clinic care coordinator who works with Darrin Mendez MD with the [facility]. I wanted to introduce myself and provide you with my contact information for you to be able to call me with any questions or concerns. I wanted to thank you for spending the time to talk with me.  Below is a description of clinic care coordination and how I can further assist you.       Our services are voluntary and are offered without charge to you personally.    Please feel free to contact me with any questions or concerns regarding care coordination and what we can offer.      We are focused on providing you with the highest-quality healthcare experience possible.    Sincerely,     Reta Moncada RN, BSN, CPHN, CM  [facility] Ambulatory Care Management  [facility]  Phone: [phone]  Email: [email or web address]    Moni BRYAN [facility]  Community Health Worker  [facility]  Clinic Care Coordination  [phone]    Crys Enriquez RN Care Coordination  [facility]: [place], [place], [place]    Email: [email or web address]  Phone: [phone]    Enclosed: Additional information about Care Coordination.     WHAT IS CARE COORDINATION?    [facility] Care Coordination supports patients  and families dealing with chronic or complex health  conditions, developmental issues, and social service  needs. This service is available to patients of all ages,  from babies to seniors.  When you re facing a difficult decision about caring  for yourself or someone you love, we can help you  understand your options. We identify and refer you to  community resources that help with financial, legal,  mental health, transportation, and other issues. We  also  help with your medical and related education needs.    IS CARE COORDINATION RIGHT FOR ME?    Discuss a referral to Care Coordination with your  primary care provider or care team member.    HOW CAN I CONNECT WITH CARE COORDINATION?     Contact your clinic.   Speak with your doctor or clinic staff.   Discuss care coordination with hospital  staff before discharge.    MEET YOUR CARE COORDINATION TEAM    Registered Nurse Care Coordinator   Provides education on medications,  disease management, and new diagnoses.   Addresses concerns about medical conditions  and connects you to resources.   Develops patient-centered goals and  communicates with patient s care team.    Social Work Care Coordinator   Provides education on emotional wellbeing.   Connects you to a variety of  community-based resources.   Communicates with care team  and community partners.    Community Health Worker   Identifies health and social barriers and  connects you with community resources.   Develops nonclinical patient and  family centered goals.   Enhances communication between patients and care teams.        Financial Resource Worker   Assists patients with applying for health  insurance (Delma GRIGSBY, Cristel).   Helps patients with applying for  county benefits.   Connects patients with [facility]

## 2023-08-14 ENCOUNTER — PATIENT OUTREACH (OUTPATIENT)
Dept: CARE COORDINATION | Facility: CLINIC | Age: 68
End: 2023-08-14
Payer: COMMERCIAL

## 2023-08-14 ASSESSMENT — ACTIVITIES OF DAILY LIVING (ADL): DEPENDENT_IADLS:: INDEPENDENT

## 2023-08-14 NOTE — LETTER
[facility]  Patient Centered Plan of Care  About Me:      Patient Name:  Rk Longoria    YOB: 1955  Age:         68 year old   [place] MRN:    [id number] Telephone Information:  Home Phone [phone]   Mobile [phone]       Address:  [address] [address] Email address:  [email or web address]      Emergency Contact(s)    Name Relationship Lgl Grd Work Phone Home Phone Mobile Phone   1. LELAND,MAXIME* Spouse   [phone]           Primary language:  English     needed? No   [facility] [employer]:  [phone] op. 1  Other communication barriers:Glasses    Preferred Method of Communication:  Mail  Current living arrangement: I live in a private home with spouse    Mobility Status/ Medical Equipment: Independent    Health Maintenance  Health Maintenance Reviewed: Due/Overdue   Health Maintenance Due   Topic Date Due    SPIROMETRY  Never done    COPD ACTION PLAN  Never done    DEPRESSION ACTION PLAN  Never done    EYE EXAM  Never done    COLORECTAL CANCER SCREENING  Never done    HEPATITIS A IMMUNIZATION (1 of 2 - Risk 2-dose series) Never done    ZOSTER IMMUNIZATION (1 of 2) Never done    HEPATITIS B IMMUNIZATION (1 of 3 - Risk 3-dose series) Never done    NICOTINE/TOBACCO CESSATION COUNSELING Q 1 YR  11/03/2018    DIABETIC FOOT EXAM  02/16/2019    LUNG CANCER SCREENING  11/26/2019    AORTIC ANEURYSM SCREENING (SYSTEM ASSIGNED)  Never done    COVID-19 Vaccine (5 - Pfizer series) 03/10/2023      My Access Plan  Medical Emergency 911   Primary Clinic Line [facility] [phone]   24 Hour Appointment Line [phone] or  [phone]) (toll-free)   24 Hour Nurse Line [phone] (toll-free)   Preferred Urgent Care [facility], [phone]     Preferred Hospital [facility]  [phone]     Preferred Pharmacy [id number] [place], [place] -  [address]     Behavioral Health Crisis Line The National Suicide Prevention Lifeline at 1-834.611.9688 or Text/Call 598     My Care Team Members  Patient Care Team         Relationship Specialty Notifications Start End    Darrin Mendez MD PCP - General Family Practice  3/4/14     Phone: [phone] Fax: [phone] [address] [place] [address]    Bhavna Mays OD MD Ophthalmology  1/5/23     Phone: [phone] Fax: [phone] [address] [place] [address]    Jasper Solano, NP Referring Physician Nurse Practitioner Primary Care  1/5/23     Referring Physician to eye    Phone: [phone] Fax: [phone]         [address] [facility] [address]    Jasper Solano NP Assigned PCP   7/15/23     Phone: [phone] Fax: [phone]         [address] [facility] [address]    Crys Enriquez, RN Lead Care Coordinator  Admissions 8/14/23     Phone: [phone]         June Cavazos MA Community Health Worker Primary Care - CC Admissions 8/14/23     Phone: [phone]                   My Care Plans  Self Management and Treatment Plan  Care Plan  Care Plan: Specialty Referral       Problem: Patient is in need of specialty care       Long-Range Goal: Establish consistent relationship with specialist(s) as needed       Start Date: 8/14/2023 Expected End Date: 3/29/2024    Note:     Barriers: diagnosis of multiple, complex, chronic medical conditions, provider availability - wait time to complete appointments, unsure of insurance benefits/out of pocket costs/deductible, etc.   Strengths: motivated, engaged in care coordination  Patient expressed understanding of goal: yes  Action steps to achieve this goal:  1. I will follow up with my providers as scheduled/recommended:   Primary Care Provider: 11/02/23 at 1:10 pm   GI  Cardiology   Ultrasound of heart/AAA screening  2. I will discuss, review, schedule and complete  recommended overdue health maintenance with my Primary Care Provider.   3. I will take my medications as prescribed.   4. I will contact my care team with questions, concerns, support needs. I will use the clinic as a resource and I understand I can contact my clinic with 24/7 after hours services available. Care Coordinator will remain available as needed.                                 Action Plans on File:                       Advance Care Plans/Directives Type:   POLST      My Medical and Care Information  Problem List   Patient Active Problem List   Diagnosis    Mild major depression (H)    Tobacco use disorder    Pain in a tooth or teeth    Advanced directives, counseling/discussion    Hyperlipidemia LDL goal <100    Vitamin D deficiency    Type 2 diabetes mellitus with hyperglycemia, without long-term current use of insulin (H)    Chronic hepatitis C without hepatic coma (H)    Benign prostatic hyperplasia with weak urinary stream    Benign essential hypertension    S/P coronary artery stent placement    Ascending aorta dilatation (H)    Coronary artery disease involving native coronary artery of native heart without angina pectoris    Hypertrophic cardiomyopathy (H)    Panlobular emphysema (H)      Current Medications and Allergies:    Allergies   Allergen Reactions    Duloxetine      Dizzy,Nausea, felt like going to vomit      Current Outpatient Medications:     acetaminophen (TYLENOL) 500 MG tablet, Take 1,500 mg by mouth daily Arthritis pain, Disp: , Rfl:     amLODIPine (NORVASC) 10 MG tablet, Take 1 tablet (10 mg) by mouth daily, Disp: 30 tablet, Rfl: 1    aspirin (ASA) 81 MG EC tablet, Take 1 tablet (81 mg) by mouth daily, Disp: 90 tablet, Rfl: 3    atorvastatin (LIPITOR) 40 MG tablet, Take 1 tablet (40 mg) by mouth daily, Disp: 90 tablet, Rfl: 2    blood glucose (NO BRAND SPECIFIED) lancets standard, Use to test blood sugar 1 times daily or as directed., Disp: 100 each, Rfl: 11    blood glucose (NO  BRAND SPECIFIED) test strip, Use to test blood sugars 1 times daily or as directed, Disp: 100 strip, Rfl: 0    blood glucose monitoring (NO BRAND SPECIFIED) meter device kit, Use to test blood sugar 1 times daily or as directed., Disp: 1 kit, Rfl: 0    calcium carbonate 500 mg, elemental, (OS-HANNAH) 500 MG tablet, Take 1 tablet by mouth daily, Disp: , Rfl:     escitalopram (LEXAPRO) 10 MG tablet, Take 1 tablet (10 mg) by mouth daily, Disp: 30 tablet, Rfl: 0    glipiZIDE (GLUCOTROL XL) 5 MG 24 hr tablet, Take 1 tablet (5 mg) by mouth daily, Disp: 90 tablet, Rfl: 0    lisinopril (ZESTRIL) 40 MG tablet, Take 1 tablet (40 mg) by mouth daily, Disp: 90 tablet, Rfl: 1    LORazepam (ATIVAN) 0.5 MG tablet, Take 1 tablet (0.5 mg) by mouth every 6 hours as needed for anxiety, Disp: 12 tablet, Rfl: 0    metFORMIN (GLUCOPHAGE) 500 MG tablet, Take 2 tablets (1,000 mg) by mouth 2 times daily (with meals), Disp: 360 tablet, Rfl: 1    nitroGLYcerin (NITROSTAT) 0.4 MG sublingual tablet, Place 1 tablet (0.4 mg) under the tongue every 5 minutes as needed for chest pain For chest pain place 1 tablet under the tongue every 5 minutes for 3 doses. If symptoms persist 5 minutes after 1st dose call 911., Disp: 20 tablet, Rfl: 1    sildenafil (VIAGRA) 100 MG tablet, Take 1 tablet (100 mg) by mouth daily as needed, Disp: 20 tablet, Rfl: 1    tamsulosin (FLOMAX) 0.4 MG capsule, Take 1 capsule (0.4 mg) by mouth daily, Disp: 90 capsule, Rfl: 1    traZODone (DESYREL) 50 MG tablet, Take 1 tablet (50 mg) by mouth At Bedtime, Disp: 90 tablet, Rfl: 1    UNABLE TO FIND, 650 mg MEDICATION NAME: Arthritis Aspirin Medication, Takes TID, Disp: , Rfl:     UNABLE TO FIND, MEDICATION NAME: B1, B6, B12 and magnesium, Disp: , Rfl:     Vitamin D, Cholecalciferol, 1000 units TABS, Take 2,000 Units by mouth daily , Disp: , Rfl:      Care Coordination Start Date: 8/14/2023   Frequency of Care Coordination: monthly     Form Last Updated: 08/15/2023

## 2023-08-14 NOTE — PROGRESS NOTES
Clinic Care Coordination Contact  OUTREACH    Referral Information:  Referral Source: PCP  Primary Diagnosis: Diabetes    Chief Complaint   Patient presents with    Clinic Care Coordination - Initial     Universal Utilization: 17% Risk of Admission or ED Visit   Clinic Utilization  Difficulty keeping appointments:: No  Compliance Concerns: No  No-Show Concerns: No  No PCP office visit in Past Year: No  Utilization      Hospital Admissions  0             ED Visits  1             No Show Count (past year)  0                    Current as of: 8/15/2023  2:27 AM              Clinical Concerns:  Current Medical Concerns:    Patient Active Problem List   Diagnosis    Mild major depression (H)    Tobacco use disorder    Pain in a tooth or teeth    Advanced directives, counseling/discussion    Hyperlipidemia LDL goal <100    Vitamin D deficiency    Type 2 diabetes mellitus with hyperglycemia, without long-term current use of insulin (H)    Chronic hepatitis C without hepatic coma (H)    Benign prostatic hyperplasia with weak urinary stream    Benign essential hypertension    S/P coronary artery stent placement    Ascending aorta dilatation (H)    Coronary artery disease involving native coronary artery of native heart without angina pectoris    Hypertrophic cardiomyopathy (H)    Panlobular emphysema (H)      Patient explains that he was a patient of Dr. Mendez's for many years. He started going to [facility] provider due to being unable to get in with Dr. Mendez when needed. Shares history of being out of one of his medications for 3 days while awaiting an appointment and didn't think it was a big deal, however resulted in an ER trip. Explains he would like to re-establish care with Dr. Mendez or a provider at the [facility] as he lives in [place]. Notes that he doesn't need Primary Care Provider follow up appointment anytime soon as he recently seen [facility] provider.    States that his health insurance continues to send  him a letter in the mail informing him that medications are only covered for 3 months at a time.   Reviewed medications and all but Amlodipine & Lexapro are 90 day supply.    Patient denies any concerns with medication affordability.    Patient reports his average blood sugar reading is 120-130 range.     Per last provider office visit note, follow up recommended:   GI - TBD  Cardiology - TBD  Ultrasound of heart - AAA screening - TBD  PCP 3 months - scheduled 11/02/2023 at 1:10 pm    Patient's primary concern during outreach is getting all medications for 3 month supply and scheduling next visit with new Primary Care Provider.      Current Behavioral Concerns: none noted.     Education Provided to patient: Care Coordination role, clinic after hours, medications, appointments discussed/reviewed. Support provided.    Pain  Pain (GOAL):: No  Health Maintenance Reviewed: Due/Overdue   Health Maintenance Due   Topic Date Due    SPIROMETRY  Never done    COPD ACTION PLAN  Never done    DEPRESSION ACTION PLAN  Never done    EYE EXAM  Never done    COLORECTAL CANCER SCREENING  Never done    HEPATITIS A IMMUNIZATION (1 of 2 - Risk 2-dose series) Never done    ZOSTER IMMUNIZATION (1 of 2) Never done    HEPATITIS B IMMUNIZATION (1 of 3 - Risk 3-dose series) Never done    NICOTINE/TOBACCO CESSATION COUNSELING Q 1 YR  11/03/2018    DIABETIC FOOT EXAM  02/16/2019    LUNG CANCER SCREENING  11/26/2019    AORTIC ANEURYSM SCREENING (SYSTEM ASSIGNED)  Never done    COVID-19 Vaccine (5 - Pfizer series) 03/10/2023      Clinical Pathway: None    Medication Management:  Medication review status: Medications reviewed and no changes reported per patient.          Functional Status:  Dependent ADLs:: Independent  Dependent IADLs:: Independent  Bed or wheelchair confined:: No  Mobility Status: Independent  Fallen 2 or more times in the past year?: No  Any fall with injury in the past year?: No    Living Situation:  Current living  arrangement:: I live in a private home with spouse  Type of residence:: [facility]    Lifestyle & Psychosocial Needs:    Social Determinants of Health     Tobacco Use: Medium Risk (8/2/2023)    Patient History     Smoking Tobacco Use: Former     Smokeless Tobacco Use: Never     Passive Exposure: Not on file   Alcohol Use: Not on file   Financial Resource Strain: Not on file   Food Insecurity: Not on file   Transportation Needs: Not on file   Physical Activity: Not on file   Stress: Not on file   Social Connections: Not on file   Intimate Partner Violence: Not on file   Depression: Not at risk (8/2/2023)    PHQ-2     PHQ-2 Score: 0   Housing Stability: Not on file     Diet:: Diabetic diet  Inadequate nutrition (GOAL):: No  Tube Feeding: No  Inadequate activity/exercise (GOAL):: No  Significant changes in sleep pattern (GOAL): No  Transportation means:: Regular car  Yazidism or spiritual beliefs that impact treatment:: No  Mental health DX:: Yes  Mental health DX how managed:: Medication  Mental health management concern (GOAL):: No  Chemical Dependency Status: Past Concern  Chemical Dependency Management: AA  Informal Support system:: Spouse, Family, Children (feels as though he has a good support system. He feels healthy, socially active.)      Resources and Interventions:  Current Resources:   Community Resources: DME (Continues to go to  for community support - over a decade sober)  Supplies Currently Used at Home: Diabetic Supplies (Blood pressure monitor - thinks he is not using it correctly.)  Equipment Currently Used at Home: glucometer  Employment Status: retired  Advance Care Plan/Directive  Advanced Care Plans/Directives on file:: Yes  Type Advanced Care Plans/Directives: POLST  Advanced Care Plan/Directive Status: Not Applicable    Referrals Placed: None     Care Plan:  Care Plan: Specialty Referral       Problem: Patient is in need of specialty care       Long-Range Goal: Establish consistent  relationship with specialist(s) as needed       Start Date: 8/14/2023 Expected End Date: 3/29/2024    Note:     Barriers: diagnosis of multiple, complex, chronic medical conditions, provider availability - wait time to complete appointments, unsure of insurance benefits/out of pocket costs/deductible, etc.   Strengths: motivated, engaged in care coordination  Patient expressed understanding of goal: yes  Action steps to achieve this goal:  1. I will follow up with my providers as scheduled/recommended:   Primary Care Provider: 11/02/23 at 1:10 pm   GI  Cardiology   Ultrasound of heart/AAA screening  2. I will discuss, review, schedule and complete recommended overdue health maintenance with my Primary Care Provider.   3. I will take my medications as prescribed.   4. I will contact my care team with questions, concerns, support needs. I will use the clinic as a resource and I understand I can contact my clinic with 24/7 after hours services available. Care Coordinator will remain available as needed.                              Patient/Caregiver understanding: Patient/caregiver verbalized understanding and denies any additional questions or concerns at this time. RNCC engaged in AIDET communications during encounter.      Outreach Frequency: monthly    Plan: RNCC will send clinic care coordination introduction letter, patient centered plan of care, and medication list to patient via mail. Patient/caregiver was provided with writers contact information and encouraged to call with questions, concerns, support needs. RNCC will remain available as needed. CHWCC will follow up with patient/caregiver again in 3-4 weeks. RNCC will review chart in 6 weeks.      Crys Enriquez RN Care Coordinator  [facility] [place], [place], [place]  Email: [email or web address]  Phone: [phone]

## 2023-08-14 NOTE — Clinical Note
Was able to get him scheduled with Dr. Mendez - please review these appointment details with patient next outreach and help get him scheduled for other recommended specialists. Let me know if you have any questions. Thanks! Crys Enriquez, DIETERCC.

## 2023-08-14 NOTE — LETTER
[facility] CARE COORDINATION  [address] [address]     August 15, 2023    Rk Longoria  [address] [place] [address]      Dear Rk,    I am a  clinic care coordinator who works with Darrin Mendez MD with the [facility]. I wanted to thank you for spending the time to talk with me.  Below is a description of clinic care coordination and how I can further assist you.       The clinic care coordination team is made up of a registered nurse, , financial resource worker and community health worker who understand the health care system. The goal of clinic care coordination is to help you manage your health and improve access to the health care system. Our team works alongside your provider to assist you in determining your health and social needs. We can help you obtain health care and community resources, providing you with necessary information and education. We can work with you through any barriers and develop a care plan that helps coordinate and strengthen the communication between you and your care team.  Our services are voluntary and are offered without charge to you personally.    Please feel free to contact me with any questions or concerns regarding care coordination and what we can offer.      We are focused on providing you with the highest-quality healthcare experience possible.    Sincerely,     Crys Enriquez RN Care Coordinator  [facility] [place] [place]  Email: [email or web address]  Phone: [phone]     Enclosed: I have enclosed a copy of the Patient Centered Plan of Care. This has helpful information and goals that we have talked about. Please keep this in an easy to access place to use as needed.

## 2023-08-15 ENCOUNTER — TELEPHONE (OUTPATIENT)
Dept: CARE COORDINATION | Facility: CLINIC | Age: 68
End: 2023-08-15
Payer: COMMERCIAL

## 2023-08-15 NOTE — TELEPHONE ENCOUNTER
Patient requesting rx for Amlodipine and Lexapro be changed to 3 month supply per insurance.    Thanks,     Crys Enriquez, RN Care Coordinator  [facility] [place]  Email: [email or web address]  Phone: [phone]

## 2023-08-16 RX ORDER — AMLODIPINE BESYLATE 10 MG/1
10 TABLET ORAL DAILY
Qty: 90 TABLET | Refills: 1 | Status: SHIPPED | OUTPATIENT
Start: 2023-08-16 | End: 2024-02-08

## 2023-08-16 RX ORDER — ESCITALOPRAM OXALATE 10 MG/1
10 TABLET ORAL DAILY
Qty: 90 TABLET | Refills: 1 | Status: SHIPPED | OUTPATIENT
Start: 2023-08-16 | End: 2024-02-08

## 2023-08-30 ENCOUNTER — PATIENT OUTREACH (OUTPATIENT)
Dept: CARE COORDINATION | Facility: CLINIC | Age: 68
End: 2023-08-30
Payer: COMMERCIAL

## 2023-08-30 NOTE — PROGRESS NOTES
Clinic Care Coordination Contact  Community Health Worker Follow Up    Care Gaps:     Health Maintenance Due   Topic Date Due    SPIROMETRY  Never done    COPD ACTION PLAN  Never done    DEPRESSION ACTION PLAN  Never done    EYE EXAM  Never done    COLORECTAL CANCER SCREENING  Never done    HEPATITIS A IMMUNIZATION (1 of 2 - Risk 2-dose series) Never done    ZOSTER IMMUNIZATION (1 of 2) Never done    HEPATITIS B IMMUNIZATION (1 of 3 - Risk 3-dose series) Never done    NICOTINE/TOBACCO CESSATION COUNSELING Q 1 YR  11/03/2018    DIABETIC FOOT EXAM  02/16/2019    LUNG CANCER SCREENING  11/26/2019    AORTIC ANEURYSM SCREENING (SYSTEM ASSIGNED)  Never done    COVID-19 Vaccine (5 - Pfizer series) 03/10/2023       Care Gap Goal set: Yes    Care Plan:   Care Plan: Specialty Referral       Problem: Patient is in need of specialty care       Long-Range Goal: Establish consistent relationship with specialist(s) as needed       Start Date: 8/14/2023 Expected End Date: 3/29/2024    This Visit's Progress: 20%    Note:     Barriers: diagnosis of multiple, complex, chronic medical conditions, provider availability - wait time to complete appointments, unsure of insurance benefits/out of pocket costs/deductible, etc.   Strengths: motivated, engaged in care coordination  Patient expressed understanding of goal: yes  Action steps to achieve this goal:  1. I will follow up with my providers as scheduled/recommended:   Primary Care Provider: 11/02/23 at 1:10 pm   GI  Cardiology   Ultrasound of heart/AAA screening  2. I will discuss, review, schedule and complete recommended overdue health maintenance with my Primary Care Provider.   3. I will take my medications as prescribed.   4. I will contact my care team with questions, concerns, support needs. I will use the clinic as a resource and I understand I can contact my clinic with 24/7 after hours services available. Care Coordinator will remain available as needed.                                 Intervention and Education during outreach: Patient notified of new PCP appt on 11/2/23 at 1:10pm, he declines wanting to schedule specialty appts- GI, cardiology US of heart. Pt is worried about insurance coverage. CHW encouraged patient to check with insurance providers and use the cost of care estimate line. Pt continue to decline assistance but does agree to another CC outreach in one month.     CHW Plan: Next outreach in one month.     KLEVER Naylor, B.S[facility] Care Coordination  [facility]:  [place], [place] and [place]  [phone]  [email or web address]

## 2023-09-19 DIAGNOSIS — E11.65 TYPE 2 DIABETES MELLITUS WITH HYPERGLYCEMIA, WITHOUT LONG-TERM CURRENT USE OF INSULIN (H): ICD-10-CM

## 2023-09-27 ENCOUNTER — PATIENT OUTREACH (OUTPATIENT)
Dept: CARE COORDINATION | Facility: CLINIC | Age: 68
End: 2023-09-27
Payer: COMMERCIAL

## 2023-09-27 NOTE — PROGRESS NOTES
Clinic Care Coordination Contact  Care Coordination Clinician Chart review    Situation: Patient chart reviewed by care coordinator.       Background: Care Coordination initial assessment and enrollment took place 8/14/2023.   Upon initial assessment patient-centered goals were discussed and developed with participation from patient.  RNCC handed patient follow up and monitoring of goal progression off to CHW for continued outreach every 30 days.        Assessment: Per chart review, patient outreach completed by CC CHW on 08/30/2023.  Patient is actively working to accomplish goal(s) and patient's goal(s) remain(s) appropriate and relevant at this time.       Care Plan: Specialty Referral       Problem: Patient is in need of specialty care       Long-Range Goal: Establish consistent relationship with specialist(s) as needed       Start Date: 8/14/2023 Expected End Date: 3/29/2024    This Visit's Progress: 20%    Note:     Barriers: diagnosis of multiple, complex, chronic medical conditions, provider availability - wait time to complete appointments, unsure of insurance benefits/out of pocket costs/deductible, etc.   Strengths: motivated, engaged in care coordination  Patient expressed understanding of goal: yes  Action steps to achieve this goal:  1. I will follow up with my providers as scheduled/recommended:   Primary Care Provider: 11/02/23 at 1:10 pm   GI  Cardiology   Ultrasound of heart/AAA screening  2. I will discuss, review, schedule and complete recommended overdue health maintenance with my Primary Care Provider.   3. I will take my medications as prescribed.   4. I will contact my care team with questions, concerns, support needs. I will use the clinic as a resource and I understand I can contact my clinic with 24/7 after hours services available. Care Coordinator will remain available as needed.                                Plan/Recommendations: RNCC Clinical Assessments to be completed annually or as  needed. Annual Assessment will be due 08/2024. The patient will continue working with Care Coordination to achieve goal(s) as above.  CHW will involve RNCC as needed or if patient is ready to transition to goal status of Maintenance.  RNCC will continue to review progress to goal(s) and CHW outreaches every 6 weeks.     Complex Care Plan:  Patient is not due for updated Plan of Care.  Care Plan updated and sent to patient: No    Crys Enriquez RN Care Coordinator  [facility]  Email: [email or web address]  Phone: [phone]

## 2023-09-29 ENCOUNTER — PATIENT OUTREACH (OUTPATIENT)
Dept: CARE COORDINATION | Facility: CLINIC | Age: 68
End: 2023-09-29
Payer: COMMERCIAL

## 2023-09-29 NOTE — PROGRESS NOTES
Clinic Care Coordination Contact  Community Health Worker Follow Up    Care Gaps:     Health Maintenance Due   Topic Date Due    SPIROMETRY  Never done    COPD ACTION PLAN  Never done    DEPRESSION ACTION PLAN  Never done    EYE EXAM  Never done    COLORECTAL CANCER SCREENING  Never done    HEPATITIS A IMMUNIZATION (1 of 2 - Risk 2-dose series) Never done    ZOSTER IMMUNIZATION (1 of 2) Never done    HEPATITIS B IMMUNIZATION (1 of 3 - Risk 3-dose series) Never done    NICOTINE/TOBACCO CESSATION COUNSELING Q 1 YR  11/03/2018    DIABETIC FOOT EXAM  02/16/2019    LUNG CANCER SCREENING  11/26/2019    AORTIC ANEURYSM SCREENING (SYSTEM ASSIGNED)  Never done    INFLUENZA VACCINE (1) 09/01/2023    COVID-19 Vaccine (5 - 2023-24 season) 09/01/2023     Establishing care with new PCP on 11/2/23.    Care Plan:   Care Plan: Specialty Referral       Problem: Patient is in need of specialty care       Long-Range Goal: Establish consistent relationship with specialist(s) as needed       Start Date: 8/14/2023 Expected End Date: 3/29/2024    This Visit's Progress: 30% Recent Progress: 20%    Note:     Barriers: diagnosis of multiple, complex, chronic medical conditions, provider availability - wait time to complete appointments, unsure of insurance benefits/out of pocket costs/deductible, etc.   Strengths: motivated, engaged in care coordination  Patient expressed understanding of goal: yes  Action steps to achieve this goal:  1. I will follow up with my providers as scheduled/recommended:   Primary Care Provider: 11/02/23 at 1:10 pm   GI  Cardiology   Ultrasound of heart/AAA screening  2. I will discuss, review, schedule and complete recommended overdue health maintenance with my Primary Care Provider.   3. I will take my medications as prescribed.   4. I will contact my care team with questions, concerns, support needs. I will use the clinic as a resource and I understand I can contact my clinic with 24/7 after hours services  available. Care Coordinator will remain available as needed.                                Intervention and Education during outreach: Patient states that he is doing well. CHW discussed possibility of receiving financial assistance through [employer] if he struggles to afford copays for specialists. Patient doubts that he would qualify with his wife's income. Does not wish to apply nor scheduling GI, cardiology, or heart US.   He does have enough medications until appt on 11/2, no other questions or concerns at this time. Patient would like to stay enrolled in CC.     CHW Plan: Next outreach in one month.     KLEVER Naylor, B.S. [facility] Care Coordination  [facility]:  [place], [place] and [place]  [phone]  [email or web address]

## 2023-11-02 ENCOUNTER — OFFICE VISIT (OUTPATIENT)
Dept: FAMILY MEDICINE | Facility: CLINIC | Age: 68
End: 2023-11-02
Payer: COMMERCIAL

## 2023-11-02 VITALS
HEART RATE: 81 BPM | DIASTOLIC BLOOD PRESSURE: 66 MMHG | HEIGHT: 72 IN | TEMPERATURE: 98.4 F | SYSTOLIC BLOOD PRESSURE: 120 MMHG | WEIGHT: 230 LBS | BODY MASS INDEX: 31.15 KG/M2 | RESPIRATION RATE: 14 BRPM | OXYGEN SATURATION: 96 %

## 2023-11-02 DIAGNOSIS — I49.9 IRREGULAR HEART BEAT: ICD-10-CM

## 2023-11-02 DIAGNOSIS — N40.1 BENIGN PROSTATIC HYPERPLASIA WITH WEAK URINARY STREAM: ICD-10-CM

## 2023-11-02 DIAGNOSIS — E11.65 TYPE 2 DIABETES MELLITUS WITH HYPERGLYCEMIA, WITHOUT LONG-TERM CURRENT USE OF INSULIN (H): ICD-10-CM

## 2023-11-02 DIAGNOSIS — R39.12 BENIGN PROSTATIC HYPERPLASIA WITH WEAK URINARY STREAM: ICD-10-CM

## 2023-11-02 DIAGNOSIS — G47.00 INSOMNIA, UNSPECIFIED TYPE: ICD-10-CM

## 2023-11-02 DIAGNOSIS — F32.0 MILD MAJOR DEPRESSION (H): ICD-10-CM

## 2023-11-02 DIAGNOSIS — Z12.5 SCREENING PSA (PROSTATE SPECIFIC ANTIGEN): ICD-10-CM

## 2023-11-02 DIAGNOSIS — J43.1 PANLOBULAR EMPHYSEMA (H): ICD-10-CM

## 2023-11-02 DIAGNOSIS — Z00.00 ROUTINE GENERAL MEDICAL EXAMINATION AT A HEALTH CARE FACILITY: Primary | ICD-10-CM

## 2023-11-02 LAB
ALBUMIN SERPL BCG-MCNC: 4.4 G/DL (ref 3.5–5.2)
ALP SERPL-CCNC: 66 U/L (ref 40–129)
ALT SERPL W P-5'-P-CCNC: 34 U/L (ref 0–70)
ANION GAP SERPL CALCULATED.3IONS-SCNC: 11 MMOL/L (ref 7–15)
AST SERPL W P-5'-P-CCNC: 35 U/L (ref 0–45)
BILIRUB SERPL-MCNC: 0.9 MG/DL
BUN SERPL-MCNC: 10.9 MG/DL (ref 8–23)
CALCIUM SERPL-MCNC: 10.4 MG/DL (ref 8.8–10.2)
CHLORIDE SERPL-SCNC: 100 MMOL/L (ref 98–107)
CREAT SERPL-MCNC: 0.86 MG/DL (ref 0.67–1.17)
DEPRECATED HCO3 PLAS-SCNC: 29 MMOL/L (ref 22–29)
EGFRCR SERPLBLD CKD-EPI 2021: >90 ML/MIN/1.73M2
ERYTHROCYTE [DISTWIDTH] IN BLOOD BY AUTOMATED COUNT: 13.4 % (ref 10–15)
GLUCOSE SERPL-MCNC: 256 MG/DL (ref 70–99)
HBA1C MFR BLD: 7.8 % (ref 0–5.6)
HCT VFR BLD AUTO: 44.9 % (ref 40–53)
HGB BLD-MCNC: 15.6 G/DL (ref 13.3–17.7)
HOLD SPECIMEN: NORMAL
MCH RBC QN AUTO: 29.8 PG (ref 26.5–33)
MCHC RBC AUTO-ENTMCNC: 34.7 G/DL (ref 31.5–36.5)
MCV RBC AUTO: 86 FL (ref 78–100)
PLATELET # BLD AUTO: 155 10E3/UL (ref 150–450)
POTASSIUM SERPL-SCNC: 4.4 MMOL/L (ref 3.4–5.3)
PROT SERPL-MCNC: 7.8 G/DL (ref 6.4–8.3)
PSA SERPL DL<=0.01 NG/ML-MCNC: 6.22 NG/ML (ref 0–4.5)
RBC # BLD AUTO: 5.23 10E6/UL (ref 4.4–5.9)
SODIUM SERPL-SCNC: 140 MMOL/L (ref 135–145)
WBC # BLD AUTO: 8.5 10E3/UL (ref 4–11)

## 2023-11-02 PROCEDURE — 85027 COMPLETE CBC AUTOMATED: CPT | Performed by: FAMILY MEDICINE

## 2023-11-02 PROCEDURE — 83036 HEMOGLOBIN GLYCOSYLATED A1C: CPT | Performed by: FAMILY MEDICINE

## 2023-11-02 PROCEDURE — 93000 ELECTROCARDIOGRAM COMPLETE: CPT | Performed by: FAMILY MEDICINE

## 2023-11-02 PROCEDURE — 99397 PER PM REEVAL EST PAT 65+ YR: CPT | Mod: 25 | Performed by: FAMILY MEDICINE

## 2023-11-02 PROCEDURE — 80053 COMPREHEN METABOLIC PANEL: CPT | Performed by: FAMILY MEDICINE

## 2023-11-02 PROCEDURE — G0103 PSA SCREENING: HCPCS | Performed by: FAMILY MEDICINE

## 2023-11-02 PROCEDURE — 36415 COLL VENOUS BLD VENIPUNCTURE: CPT | Performed by: FAMILY MEDICINE

## 2023-11-02 PROCEDURE — 99214 OFFICE O/P EST MOD 30 MIN: CPT | Mod: 25 | Performed by: FAMILY MEDICINE

## 2023-11-02 PROCEDURE — 99207 PR FOOT EXAM NO CHARGE: CPT | Mod: 25 | Performed by: FAMILY MEDICINE

## 2023-11-02 RX ORDER — LORAZEPAM 0.5 MG/1
0.5 TABLET ORAL EVERY 6 HOURS PRN
Qty: 12 TABLET | Refills: 0 | Status: SHIPPED | OUTPATIENT
Start: 2023-11-02 | End: 2024-04-16

## 2023-11-02 RX ORDER — DAPAGLIFLOZIN 10 MG/1
10 TABLET, FILM COATED ORAL DAILY
Qty: 90 TABLET | Refills: 0 | Status: SHIPPED | OUTPATIENT
Start: 2023-11-02 | End: 2024-02-08

## 2023-11-02 RX ORDER — METFORMIN HCL 500 MG
1000 TABLET, EXTENDED RELEASE 24 HR ORAL 2 TIMES DAILY WITH MEALS
Qty: 360 TABLET | Refills: 1 | Status: SHIPPED | OUTPATIENT
Start: 2023-11-02

## 2023-11-02 RX ORDER — TAMSULOSIN HYDROCHLORIDE 0.4 MG/1
0.4 CAPSULE ORAL DAILY
Qty: 90 CAPSULE | Refills: 1 | Status: SHIPPED | OUTPATIENT
Start: 2023-11-02

## 2023-11-02 RX ORDER — GLIPIZIDE 5 MG/1
5 TABLET, FILM COATED, EXTENDED RELEASE ORAL DAILY
Qty: 90 TABLET | Refills: 0 | Status: CANCELLED | OUTPATIENT
Start: 2023-11-02

## 2023-11-02 RX ORDER — TRAZODONE HYDROCHLORIDE 50 MG/1
50 TABLET, FILM COATED ORAL AT BEDTIME
Qty: 90 TABLET | Refills: 1 | Status: SHIPPED | OUTPATIENT
Start: 2023-11-02 | End: 2024-05-30

## 2023-11-02 ASSESSMENT — ANXIETY QUESTIONNAIRES
GAD7 TOTAL SCORE: 0
6. BECOMING EASILY ANNOYED OR IRRITABLE: NOT AT ALL
2. NOT BEING ABLE TO STOP OR CONTROL WORRYING: NOT AT ALL
4. TROUBLE RELAXING: NOT AT ALL
5. BEING SO RESTLESS THAT IT IS HARD TO SIT STILL: NOT AT ALL
3. WORRYING TOO MUCH ABOUT DIFFERENT THINGS: NOT AT ALL
1. FEELING NERVOUS, ANXIOUS, OR ON EDGE: NOT AT ALL
IF YOU CHECKED OFF ANY PROBLEMS ON THIS QUESTIONNAIRE, HOW DIFFICULT HAVE THESE PROBLEMS MADE IT FOR YOU TO DO YOUR WORK, TAKE CARE OF THINGS AT HOME, OR GET ALONG WITH OTHER PEOPLE: NOT DIFFICULT AT ALL
GAD7 TOTAL SCORE: 0
7. FEELING AFRAID AS IF SOMETHING AWFUL MIGHT HAPPEN: NOT AT ALL

## 2023-11-02 ASSESSMENT — ENCOUNTER SYMPTOMS
NERVOUS/ANXIOUS: 0
COUGH: 0
ARTHRALGIAS: 0
DIZZINESS: 0
FREQUENCY: 0
MYALGIAS: 0
SORE THROAT: 0
ALLERGIC/IMMUNOLOGIC NEGATIVE: 1
PALPITATIONS: 0
DYSURIA: 0
FEVER: 0
CONSTIPATION: 0
CHILLS: 0
DIARRHEA: 0
BRUISES/BLEEDS EASILY: 0
HEADACHES: 0
SHORTNESS OF BREATH: 0
EYE PAIN: 0
PARESTHESIAS: 0
ABDOMINAL PAIN: 0
WEAKNESS: 0

## 2023-11-02 ASSESSMENT — PATIENT HEALTH QUESTIONNAIRE - PHQ9
SUM OF ALL RESPONSES TO PHQ QUESTIONS 1-9: 0
SUM OF ALL RESPONSES TO PHQ QUESTIONS 1-9: 0

## 2023-11-02 ASSESSMENT — PAIN SCALES - GENERAL: PAINLEVEL: NO PAIN (0)

## 2023-11-02 NOTE — PROGRESS NOTES
Assessment and Plan    (Z00.00) Routine general medical examination at a health care facility  (primary encounter diagnosis)  Comment:   Plan:     (J43.1) Panlobular emphysema (H)  Comment: stable, has refills on meds  Plan: OFFICE/OUTPT VISIT,EST,LEVL IV, OFFICE/OUTPT         VISIT,EST,LEVL IV            (E11.65) Type 2 diabetes mellitus with hyperglycemia, without long-term current use of insulin (H)  Comment: A1c at goal, but will add dapagliflozin in hopes of being able to discontinue glipzide  Plan: HEMOGLOBIN A1C, FOOT EXAM, OFFICE/OUTPT         VISIT,EST,LEVL IV, metFORMIN (GLUCOPHAGE XR)         500 MG 24 hr tablet, dapagliflozin (FARXIGA) 10        MG TABS tablet, OFFICE/OUTPT VISIT,EST,LEVL IV            (N40.1,  R39.12) Benign prostatic hyperplasia with weak urinary stream  Comment: refill  Plan: tamsulosin (FLOMAX) 0.4 MG capsule,         OFFICE/OUTPT VISIT,EST,LEVL IV, OFFICE/OUTPT         VISIT,EST,LEVL IV            (G47.00) Insomnia, unspecified type  Comment: mood stable, refilling  Plan: traZODone (DESYREL) 50 MG tablet, OFFICE/OUTPT         VISIT,EST,LEVL IV, OFFICE/OUTPT VISIT,EST,LEVL         IV            (F32.0) Mild major depression (H24)  Comment: mood stable, refilling PRN  Plan: LORazepam (ATIVAN) 0.5 MG tablet, OFFICE/OUTPT         VISIT,EST,LEVL IV            (I49.9) Irregular heart beat  Comment: irreg irreg on exm, nl EKG.  Paroxysmal a fib?  Consider ambulatory  Plan: EKG 12-lead complete w/read - Clinics              RTC in  DM    Darrin Mendez MD      Dali Beckman is a 68 year old, presenting for the following health issues:  Recheck Medication        11/2/2023     1:17 PM   Additional Questions   Roomed by MR   Accompanied by CARLOS         11/2/2023     1:17 PM   Patient Reported Additional Medications   Patient reports taking the following new medications CARLOS       HPI       Diabetes Follow-up    How often are you checking your blood sugar? Not at all  What concerns do you  have today about your diabetes? None- tries to do the best he can with diet    Do you have any of these symptoms? (Select all that apply)  Numbness to feet and tingling     Have you had a diabetic eye exam in the last 12 months? Yes- Date of last eye exam: Summer 2023,  Location: [facility] [place]    Back to just using cheaters to see since his new glasses he made him dizzy         Hyperlipidemia Follow-Up    Are you regularly taking any medication or supplement to lower your cholesterol?   Yes- Atorvastatin   Are you having muscle aches or other side effects that you think could be caused by your cholesterol lowering medication?  He has muscle aches all the time but he doesn't know if it is due to age    Hypertension Follow-up    Do you check your blood pressure regularly outside of the clinic? Occasionally at [facility] or pharmacy.   He has an at home blood pressure  machine but he doesn't trust the readings   Are you following a low salt diet? Yes  Are your blood pressures ever more than 140 on the top number (systolic) OR more   than 90 on the bottom number (diastolic), for example 140/90? Averaging at 180 systolic     BP Readings from Last 2 Encounters:   23 120/66   23 (!) 150/80     Hemoglobin A1C (%)   Date Value   2023 7.8 (H)   2023 7.6 (H)   2018 5.6   2018 5.4     LDL Cholesterol Calculated (mg/dL)   Date Value   2023 73   2022 52   2018 91   2017 78         Depression Followup  How are you doing with your depression since your last visit? No change  Are you having other symptoms that might be associated with depression? No  Have you had a significant life event?  No   Are you feeling anxious or having panic attacks?   No  Do you have any concerns with your use of alcohol or other drugs? No    Social History     Tobacco Use    Smoking status: Former     Years: 80     Types: Cigarettes     Quit date: 2018     Years since quittin.1     Smokeless tobacco: Never   Vaping Use    Vaping Use: Some days    Substances: Nicotine, Flavoring    Devices: Refillable tank   Substance Use Topics    Alcohol use: Not Currently    Drug use: Yes     Types: Marijuana         1/4/2023     1:01 PM 8/2/2023     1:58 PM 11/2/2023     1:05 PM   PHQ   PHQ-9 Total Score 4 0 0   Q9: Thoughts of better off dead/self-harm past 2 weeks Not at all Not at all Not at all         7/28/2021    11:38 AM 1/13/2022     7:25 AM 11/2/2023     1:06 PM   NABIL-7 SCORE   Total Score  4 (minimal anxiety) 0 (minimal anxiety)   Total Score 13 4 0         Suicide Assessment Five-step Evaluation and Treatment (SAFE-T)    Medication Followup of Lorazepam 0.5 mg and trazodone 50 mg     Taking Medication as prescribed: yes  Side Effects:  None  Medication Helping Symptoms:  yes    Feeling well over all.  Weight is up a bit, but no concerns.    Uses ativan infrequently, often won't use even once a month.      Review of Systems   Constitutional:  Negative for chills and fever.   HENT:  Negative for congestion, ear pain, hearing loss and sore throat.    Eyes:  Negative for pain and visual disturbance.   Respiratory:  Negative for cough and shortness of breath.    Cardiovascular:  Negative for chest pain, palpitations and peripheral edema.   Gastrointestinal:  Negative for abdominal pain, constipation and diarrhea.   Endocrine: Negative for polyuria.   Genitourinary:  Negative for dysuria and frequency.   Musculoskeletal:  Negative for arthralgias and myalgias.   Skin:  Negative for rash.   Allergic/Immunologic: Negative.    Neurological:  Negative for dizziness, weakness, headaches and paresthesias.   Hematological:  Does not bruise/bleed easily.   Psychiatric/Behavioral:  Negative for mood changes. The patient is not nervous/anxious.           Objective    /66 (BP Location: Right arm, Patient Position: Sitting, Cuff Size: Adult Large)   Pulse 81   Temp 98.4  F (36.9  C) (Oral)   Resp 14   Ht  "1.816 m (5' 11.5\")   Wt 104.3 kg (230 lb)   SpO2 96%   BMI 31.63 kg/m    Body mass index is 31.63 kg/m .  Physical Exam  Vitals and nursing note reviewed.   Constitutional:       Appearance: He is well-developed.   HENT:      Head: Normocephalic and atraumatic.      Right Ear: Tympanic membrane, ear canal and external ear normal.      Left Ear: Tympanic membrane, ear canal and external ear normal.      Nose: Nose normal.   Eyes:      Extraocular Movements: Extraocular movements intact.      Conjunctiva/sclera: Conjunctivae normal.      Pupils: Pupils are equal, round, and reactive to light.   Neck:      Thyroid: No thyromegaly.   Cardiovascular:      Rate and Rhythm: Normal rate. Rhythm irregularly irregular.      Pulses:           Dorsalis pedis pulses are 0 on the right side and 0 on the left side.      Heart sounds: Normal heart sounds.   Pulmonary:      Effort: Pulmonary effort is normal.      Breath sounds: Normal breath sounds.   Abdominal:      General: Bowel sounds are normal.      Palpations: Abdomen is soft. There is no mass.      Tenderness: There is no abdominal tenderness.   Musculoskeletal:         General: Normal range of motion.      Cervical back: Neck supple.   Lymphadenopathy:      Cervical: No cervical adenopathy.   Skin:     General: Skin is warm and dry.      Findings: No rash.      Comments: Skin on feet healthy.  No wounds, callous, onychomycosis.   Neurological:      Mental Status: He is alert and oriented to person, place, and time.      Gait: Gait normal.      Deep Tendon Reflexes:      Reflex Scores:       Patellar reflexes are 2+ on the right side and 2+ on the left side.       Achilles reflexes are 2+ on the right side and 2+ on the left side.     Comments: Nl filament and proprioception in feet SWATI   Psychiatric:         Mood and Affect: Mood normal.         Behavior: Behavior normal.         Judgment: Judgment normal.                        "

## 2023-11-03 ENCOUNTER — TELEPHONE (OUTPATIENT)
Dept: FAMILY MEDICINE | Facility: CLINIC | Age: 68
End: 2023-11-03
Payer: COMMERCIAL

## 2023-11-03 DIAGNOSIS — E11.65 TYPE 2 DIABETES MELLITUS WITH HYPERGLYCEMIA, WITHOUT LONG-TERM CURRENT USE OF INSULIN (H): Primary | ICD-10-CM

## 2023-11-03 NOTE — TELEPHONE ENCOUNTER
S-(situation): Patient calls regarding issue with medication cost (Farxiga).    B-(background): Patient had visit with PCP yesterday who ordered medication at visit. See relevant excerpt from visit note below:  (E11.65) Type 2 diabetes mellitus with hyperglycemia, without long-term current use of insulin (H)  Comment: A1c at goal, but will add dapagliflozin in hopes of being able to discontinue glipzide    Patient states that provider advised that he call the clinic if he encounters issues with medication cost.    A-(assessment): Patient reports that co-pay for this medication is $1,000 which he cannot afford.    R-(recommendations): Advised patient that will update PCP and return call with further recommendations.    Patient denies further questions at this time.    Flavia DENNIS RN, BSN, PHN

## 2023-11-03 NOTE — TELEPHONE ENCOUNTER
"Contacted [facility] pharmacy in [place] regarding patient's insurance coverage for the following medications:    Invokana & Jardiance - Pharmacist requested that provider send over prescriptions with the following note \"test claim - please dispense whichever medication is cheaper\".    Routing to provider to advise and send Rx to pharmacy.    Flavia DENNIS RN, BSN, PHN            "

## 2023-11-08 ENCOUNTER — PATIENT OUTREACH (OUTPATIENT)
Dept: CARE COORDINATION | Facility: CLINIC | Age: 68
End: 2023-11-08
Payer: COMMERCIAL

## 2023-11-08 NOTE — LETTER
[facility] CARE COORDINATION  [address] [address]     November 8, 2023        Rk DILSHAD Longoria  [address] [place] [address]          Dear Shanice Beckman is an updated Patient Centered Plan of Care for your continued enrollment in Care Coordination. Please let us know if you have additional questions, concerns, or goals that we can assist with.    Sincerely,    Crys Enriquez RN Care Coordinator  [facility]  Email: [email or web address]  Phone: [phone]              [facility]  Patient Centered Plan of Care  About Me:      Patient Name:  Rk Longoria    YOB: 1955  Age:         68 year old   [place] MRN:    [id number] Telephone Information:  Home Phone [phone]   Mobile [phone]       Address:  [address] [place] [address] Email address:  [email or web address] My Mobile      Emergency Contact(s)    Name Relationship Lgl Grd Work Phone Home Phone Mobile Phone   1. MAXIME LONGORIA* Spouse   [phone]           Primary language:  English     needed? No   [place] [employer]:  [phone] op. 1  Other communication barriers:Glasses    Preferred Method of Communication:  Mail  Current living arrangement: I live in a private home with spouse    Mobility Status/ Medical Equipment: Independent    Health Maintenance  Health Maintenance Reviewed: Due/Overdue   Health Maintenance Due   Topic Date Due    SPIROMETRY  Never done    COPD ACTION PLAN  Never done    DEPRESSION ACTION PLAN  Never done    COLORECTAL CANCER SCREENING  Never done    HEPATITIS A IMMUNIZATION (1 of 2 - Risk 2-dose series) Never done    ZOSTER IMMUNIZATION (1 of 2) Never done    HEPATITIS B IMMUNIZATION (1 of 3 - Risk 3-dose series) Never done    RSV VACCINE (Pregnancy & 60+) (1 - 1-dose 60+ series) Never done    NICOTINE/TOBACCO CESSATION COUNSELING Q 1 YR  11/03/2018    LUNG CANCER SCREENING   11/26/2019    AORTIC ANEURYSM SCREENING (SYSTEM ASSIGNED)  Never done    COVID-19 Vaccine (5 - 2023-24 season) 09/01/2023    Pneumococcal Vaccine: 65+ Years (3 - PPSV23 or PCV20) 10/30/2023      My Access Plan  Medical Emergency 911   Primary Clinic Line [facility] - [phone]   24 Hour Appointment Line [phone] or  [phone]) (toll-free)   24 Hour Nurse Line [phone] (toll-free)   Preferred Urgent Care [facility] [place], [phone]     Preferred Hospital [facility]  [phone]     Preferred Pharmacy [id number] [place], [place] - [address]     Behavioral Health Crisis Line The National Suicide Prevention Lifeline at 1-220.820.1068 or Text/Call 298     My Care Team Members  Patient Care Team         Relationship Specialty Notifications Start End    Darrin Mendez MD PCP - General Family Practice  3/4/14     Phone: [phone] Fax: [phone] [address] [facility] [address]    Bhavna Mays OD MD Ophthalmology  1/5/23     Phone: [phone] Fax: [phone] [address] [place] [address]    Jasper Solano NP Referring Physician Nurse Practitioner Primary Care  1/5/23     Referring Physician to eye    Phone: [phone] Fax: [phone]         [address] [facility] [address]    Jasper Solano NP Assigned PCP   7/15/23     Phone: [phone] Fax: [phone]         [address] [facility] [address]    Crys Enriquez RN Lead Care Coordinator  Admissions 8/14/23     Phone: [phone]         June Cavazos CHW Community Health Worker Primary Care - CC Admissions 8/14/23     Phone: [phone]                     My Care Plans  Self Management and Treatment Plan    Care Plan  Care Plan: Specialty Referral       Problem: Patient is in need of specialty care       Long-Range Goal: Establish consistent  relationship with specialist(s) as needed       Start Date: 8/14/2023 Expected End Date: 3/29/2024    Recent Progress: 30%    Note:     Barriers: diagnosis of multiple, complex, chronic medical conditions, provider availability - wait time to complete appointments, unsure of insurance benefits/out of pocket costs/deductible, etc.   Strengths: motivated, engaged in care coordination  Patient expressed understanding of goal: yes  Action steps to achieve this goal:  1. I will follow up with my providers as scheduled/recommended:   Primary Care Provider: 02/08/2024  GI: TBD  Cardiology: TBD   Ultrasound of heart/AAA screening: TBD  2. I will discuss, review, schedule and complete recommended overdue health maintenance with my Primary Care Provider.   3. I will take my medications as prescribed.   4. I will contact my care team with questions, concerns, support needs. I will use the clinic as a resource and I understand I can contact my clinic with 24/7 after hours services available. Care Coordinator will remain available as needed.                                Action Plans on File:                       Advance Care Plans/Directives:   Advanced Care Plan/Directives on file:   Yes    Status of Document(s): No data recorded  Advanced Care Plan/Directives Type:   POLST           My Medical and Care Information  Problem List   Patient Active Problem List   Diagnosis    Mild major depression (H24)    Tobacco use disorder    Pain in a tooth or teeth    Advanced directives, counseling/discussion    Hyperlipidemia LDL goal <100    Vitamin D deficiency    Type 2 diabetes mellitus with hyperglycemia, without long-term current use of insulin (H)    Chronic hepatitis C without hepatic coma (H)    Benign prostatic hyperplasia with weak urinary stream    Benign essential hypertension    S/P coronary artery stent placement    Ascending aorta dilatation (H24)    Coronary artery disease involving native coronary artery of native  heart without angina pectoris    Hypertrophic cardiomyopathy (H)    Panlobular emphysema (H)      Current Medications and Allergies:    Allergies   Allergen Reactions    Duloxetine      Dizzy,Nausea, felt like going to vomit      Current Outpatient Medications:     acetaminophen (TYLENOL) 500 MG tablet, Take 1,500 mg by mouth daily Arthritis pain, Disp: , Rfl:     amLODIPine (NORVASC) 10 MG tablet, Take 1 tablet (10 mg) by mouth daily, Disp: 90 tablet, Rfl: 1    aspirin (ASA) 81 MG EC tablet, Take 1 tablet (81 mg) by mouth daily, Disp: 90 tablet, Rfl: 3    atorvastatin (LIPITOR) 40 MG tablet, Take 1 tablet (40 mg) by mouth daily, Disp: 90 tablet, Rfl: 2    blood glucose (NO BRAND SPECIFIED) lancets standard, Use to test blood sugar 1 times daily or as directed., Disp: 100 each, Rfl: 11    blood glucose (NO BRAND SPECIFIED) test strip, Use to test blood sugars 1 times daily or as directed, Disp: 100 strip, Rfl: 0    blood glucose monitoring (NO BRAND SPECIFIED) meter device kit, Use to test blood sugar 1 times daily or as directed., Disp: 1 kit, Rfl: 0    calcium carbonate 500 mg, elemental, (OS-HANNAH) 500 MG tablet, Take 1 tablet by mouth daily, Disp: , Rfl:     canagliflozin (INVOKANA) 300 MG tablet, Take 1 tablet (300 mg) by mouth every morning (before breakfast), Disp: 90 tablet, Rfl: 0    Cyanocobalamin (B-12 PO), , Disp: , Rfl:     dapagliflozin (FARXIGA) 10 MG TABS tablet, Take 1 tablet (10 mg) by mouth daily, Disp: 90 tablet, Rfl: 0    empagliflozin (JARDIANCE) 25 MG TABS tablet, Take 1 tablet (25 mg) by mouth daily, Disp: 90 tablet, Rfl: 0    escitalopram (LEXAPRO) 10 MG tablet, Take 1 tablet (10 mg) by mouth daily, Disp: 90 tablet, Rfl: 1    glipiZIDE (GLUCOTROL XL) 5 MG 24 hr tablet, Take 1 tablet (5 mg) by mouth daily, Disp: 90 tablet, Rfl: 0    lisinopril (ZESTRIL) 40 MG tablet, Take 1 tablet (40 mg) by mouth daily, Disp: 90 tablet, Rfl: 1    LORazepam (ATIVAN) 0.5 MG tablet, Take 1 tablet (0.5 mg) by  mouth every 6 hours as needed for anxiety, Disp: 12 tablet, Rfl: 0    LORazepam (ATIVAN) 0.5 MG tablet, Take 1 tablet (0.5 mg) by mouth every 6 hours as needed for anxiety, Disp: 12 tablet, Rfl: 0    metFORMIN (GLUCOPHAGE XR) 500 MG 24 hr tablet, Take 2 tablets (1,000 mg) by mouth 2 times daily (with meals), Disp: 360 tablet, Rfl: 1    nitroGLYcerin (NITROSTAT) 0.4 MG sublingual tablet, Place 1 tablet (0.4 mg) under the tongue every 5 minutes as needed for chest pain For chest pain place 1 tablet under the tongue every 5 minutes for 3 doses. If symptoms persist 5 minutes after 1st dose call 911., Disp: 20 tablet, Rfl: 1    Pyridoxine HCl (B-6 PO), , Disp: , Rfl:     sildenafil (VIAGRA) 100 MG tablet, Take 1 tablet (100 mg) by mouth daily as needed, Disp: 20 tablet, Rfl: 1    tamsulosin (FLOMAX) 0.4 MG capsule, Take 1 capsule (0.4 mg) by mouth daily, Disp: 90 capsule, Rfl: 1    Thiamine HCl (B-1 PO), , Disp: , Rfl:     traZODone (DESYREL) 50 MG tablet, Take 1 tablet (50 mg) by mouth at bedtime, Disp: 90 tablet, Rfl: 1    UNABLE TO FIND, 650 mg MEDICATION NAME: Arthritis Aspirin Medication, Takes TID, Disp: , Rfl:     UNABLE TO FIND, MEDICATION NAME: B1, B6, B12 and magnesium, Disp: , Rfl:     Vitamin D, Cholecalciferol, 1000 units TABS, Take 2,000 Units by mouth daily , Disp: , Rfl:      Care Coordination Start Date: 8/14/2023   Frequency of Care Coordination: monthly, more frequently as needed     Form Last Updated: 11/08/2023

## 2023-11-08 NOTE — PROGRESS NOTES
Clinic Care Coordination Contact  Community Health Worker Follow Up    Care Gaps:     Health Maintenance Due   Topic Date Due    SPIROMETRY  Never done    COPD ACTION PLAN  Never done    DEPRESSION ACTION PLAN  Never done    COLORECTAL CANCER SCREENING  Never done    HEPATITIS A IMMUNIZATION (1 of 2 - Risk 2-dose series) Never done    ZOSTER IMMUNIZATION (1 of 2) Never done    HEPATITIS B IMMUNIZATION (1 of 3 - Risk 3-dose series) Never done    RSV VACCINE (Pregnancy & 60+) (1 - 1-dose 60+ series) Never done    NICOTINE/TOBACCO CESSATION COUNSELING Q 1 YR  11/03/2018    LUNG CANCER SCREENING  11/26/2019    AORTIC ANEURYSM SCREENING (SYSTEM ASSIGNED)  Never done    COVID-19 Vaccine (5 - 2023-24 season) 09/01/2023    Pneumococcal Vaccine: 65+ Years (3 - PPSV23 or PCV20) 10/30/2023     Appt completed 11/2/23    Care Plan:   Care Plan: Specialty Referral       Problem: Patient is in need of specialty care       Long-Range Goal: Establish consistent relationship with specialist(s) as needed       Start Date: 8/14/2023 Expected End Date: 3/29/2024    This Visit's Progress: 40% Recent Progress: 30%    Note:     Barriers: diagnosis of multiple, complex, chronic medical conditions, provider availability - wait time to complete appointments, unsure of insurance benefits/out of pocket costs/deductible, etc.   Strengths: motivated, engaged in care coordination  Patient expressed understanding of goal: yes  Action steps to achieve this goal:  1. I will follow up with my providers as scheduled/recommended:   Primary Care Provider: 02/08/2024  GI: TBD  Cardiology: TBD   Ultrasound of heart/AAA screening: TBD  2. I will discuss, review, schedule and complete recommended overdue health maintenance with my Primary Care Provider.   3. I will take my medications as prescribed.   4. I will contact my care team with questions, concerns, support needs. I will use the clinic as a resource and I understand I can contact my clinic with 24/7  after hours services available. Care Coordinator will remain available as needed.                                Intervention and Education during outreach: Patient states that his PCP visit went well, BP was low, and he had an EKG done. He explains that one medication was over $1,000 which was unaffordable. Pt does not think he would qualify for any assistance programs. He has insurance through Medicare and wife's employer with BCBS.   CHW again discussed importance of scheduling GI, Cardiology, and US of heart. Patient is worried about cost, he agrees today to call cardiology- given number and will ask for a cost of care estimate.     CHW Plan: Next outreach in one month.     KLEVER Naylor, B.S. [facility] Care Coordination  [facility]:  [place], [place] and [place]  [phone]  [email or web address]

## 2023-11-08 NOTE — PROGRESS NOTES
Clinic Care Coordination Contact  Care Coordination Clinician Chart review    Situation: Patient chart reviewed by care coordinator.       Background: Care Coordination initial assessment and enrollment took place 8/14/2023.   Upon initial assessment patient-centered goals were discussed and developed with participation from patient.  RNCC handed patient follow up and monitoring of goal progression off to CHW for continued outreach every 30 days.        Assessment: Per chart review, patient outreach completed by CC CHW on 09/29/2023.  Patient is actively working to accomplish goal(s) and patient's goal(s) remain(s) appropriate and relevant at this time.       Care Plan: Specialty Referral       Problem: Patient is in need of specialty care       Long-Range Goal: Establish consistent relationship with specialist(s) as needed       Start Date: 8/14/2023 Expected End Date: 3/29/2024    Recent Progress: 30%    Note:     Barriers: diagnosis of multiple, complex, chronic medical conditions, provider availability - wait time to complete appointments, unsure of insurance benefits/out of pocket costs/deductible, etc.   Strengths: motivated, engaged in care coordination  Patient expressed understanding of goal: yes  Action steps to achieve this goal:  1. I will follow up with my providers as scheduled/recommended:   Primary Care Provider: 02/08/2024  GI: TBD  Cardiology: TBD   Ultrasound of heart/AAA screening: TBD  2. I will discuss, review, schedule and complete recommended overdue health maintenance with my Primary Care Provider.   3. I will take my medications as prescribed.   4. I will contact my care team with questions, concerns, support needs. I will use the clinic as a resource and I understand I can contact my clinic with 24/7 after hours services available. Care Coordinator will remain available as needed.                                Plan/Recommendations: RNCC Clinical Assessments to be completed annually or as  needed. Annual Assessment will be due 08/2024. The patient will continue working with Care Coordination to achieve goal(s) as above.  CHW will involve RNCC as needed or if patient is ready to transition to goal status of Maintenance.  RNCC will continue to review progress to goal(s) and CHW outreaches every 6 weeks.     Complex Care Plan:  Patient is due for updated Plan of Care.  Care Plan updated and sent to patient: Yes, via mail    Crys Enriquez RN Care Coordinator  [facility] [place]  Email: [email or web address]  Phone: [phone]

## 2023-11-24 DIAGNOSIS — E11.65 TYPE 2 DIABETES MELLITUS WITH HYPERGLYCEMIA, WITHOUT LONG-TERM CURRENT USE OF INSULIN (H): ICD-10-CM

## 2023-11-24 DIAGNOSIS — I10 BENIGN ESSENTIAL HYPERTENSION: ICD-10-CM

## 2023-11-24 RX ORDER — GLIPIZIDE 5 MG/1
5 TABLET, FILM COATED, EXTENDED RELEASE ORAL DAILY
Qty: 90 TABLET | Refills: 0 | Status: SHIPPED | OUTPATIENT
Start: 2023-11-24 | End: 2024-02-08

## 2023-11-24 RX ORDER — LISINOPRIL 40 MG/1
40 TABLET ORAL DAILY
Qty: 90 TABLET | Refills: 1 | Status: SHIPPED | OUTPATIENT
Start: 2023-11-24 | End: 2024-05-14

## 2023-12-06 ENCOUNTER — PATIENT OUTREACH (OUTPATIENT)
Dept: CARE COORDINATION | Facility: CLINIC | Age: 68
End: 2023-12-06
Payer: COMMERCIAL

## 2023-12-06 NOTE — PROGRESS NOTES
Clinic Care Coordination Contact  [facility]/Voicemail    Clinical Data: Care Coordinator Outreach    Outreach Documentation Number of Outreach Attempt   12/6/2023   3:15 PM 1       Left message on patient's voicemail with call back information and requested return call.    Plan: Care Coordinator will try to reach patient again in 10 business days.    KLEVER Naylor, B.S. [facility] Care Coordination  [facility] Clinics:  [place], [place] and [place]  [phone]  [email or web address]

## 2023-12-06 NOTE — PROGRESS NOTES
Clinic Care Coordination Contact  [facility]/Voicemail    Clinical Data: Care Coordinator Outreach    Outreach Documentation Number of Outreach Attempt   12/6/2023   3:15 PM 1       Left message on patient's voicemail with call back information and requested return call.    Plan: Care Coordinator will try to reach patient again in 10 business days.    KLEVER Naylor, B.S. [facility] Care Coordination  [facility] Clinics:  [place], [place] and [place]  [phone]  [email or web address]

## 2023-12-21 ENCOUNTER — PATIENT OUTREACH (OUTPATIENT)
Dept: CARE COORDINATION | Facility: CLINIC | Age: 68
End: 2023-12-21
Payer: COMMERCIAL

## 2023-12-21 NOTE — PROGRESS NOTES
Clinic Care Coordination Contact  [facility]/Voicemail    Clinical Data: Care Coordinator Outreach    Outreach Documentation Number of Outreach Attempt   12/6/2023   3:15 PM 1   12/21/2023  11:25 AM 2       Left message on patient's voicemail with call back information and requested return call.    Plan: CHW has been unsuccessful in multiple recent attempts to contact patient. Will route to lead CC to review for disenrollment and/or further direction per standard work. No further outreach will be made at this time.     KLEVER Naylor, B.S. [facility] Care Coordination  [facility] Clinics:  [place]  [phone]  [email or web address]

## 2023-12-26 ENCOUNTER — PATIENT OUTREACH (OUTPATIENT)
Dept: CARE COORDINATION | Facility: CLINIC | Age: 68
End: 2023-12-26
Payer: COMMERCIAL

## 2023-12-26 NOTE — LETTER
[facility] CARE COORDINATION  [address]  [place] [address]     December 26, 2023    Rk Longoria  [address] [place] [address]      Dear Rk,    I have been unsuccessful in reaching you since our last contact. At this time the Care Coordination team will make no further attempts to reach you, however this does not change your ability to continue receiving care from your providers at your primary care clinic. If you need additional support from a care coordinator in the future please contact me at [phone].    All of us at [facility] are invested in your health and are here to assist you in meeting your goals.     Sincerely,    Crys Enriquez RN Care Coordinator  [facility], [place], [place]  Email: [email or web address]  Phone: [phone]

## 2023-12-26 NOTE — PROGRESS NOTES
Clinic Care Coordination Contact    Situation: Patient chart reviewed by care coordinator.    Background: Patient is enrolled in clinic care coordination and followed to assist with goal(s) progression. CHW CC routed chart to RNCC for review to determine eligibility of disenrolling from Care Coordination per standard work.     Assessment: Per Chart Review, patient has been unreachable for follow up x 2 attempts with no further engagement or return calls.     Plan/Recommendations: Per Care Coordination standard work, patient will be disenrolled from Care Coordination. Care Coordinator will send disenrollment letter with care coordinator contact information via mail. Care Coordinator will do no further outreaches at this time. Care Coordinator will remain available as needed. New Care Coordination referral may be ordered with any future needs for resources, assistance, and/or support if patient is agreeable and remains engaged.        Crys Enriquez RN Care Coordinator  [facility]  Email: [email or web address]  Phone: [phone]

## 2024-02-08 ENCOUNTER — OFFICE VISIT (OUTPATIENT)
Dept: FAMILY MEDICINE | Facility: CLINIC | Age: 69
End: 2024-02-08
Attending: FAMILY MEDICINE
Payer: MEDICARE

## 2024-02-08 VITALS
BODY MASS INDEX: 31.27 KG/M2 | TEMPERATURE: 97.9 F | OXYGEN SATURATION: 97 % | WEIGHT: 230.9 LBS | RESPIRATION RATE: 14 BRPM | HEIGHT: 72 IN

## 2024-02-08 DIAGNOSIS — J43.1 PANLOBULAR EMPHYSEMA (H): Primary | ICD-10-CM

## 2024-02-08 DIAGNOSIS — E11.65 TYPE 2 DIABETES MELLITUS WITH HYPERGLYCEMIA, WITHOUT LONG-TERM CURRENT USE OF INSULIN (H): ICD-10-CM

## 2024-02-08 DIAGNOSIS — Z12.5 ENCOUNTER FOR SCREENING FOR MALIGNANT NEOPLASM OF PROSTATE: ICD-10-CM

## 2024-02-08 DIAGNOSIS — I77.810 ASCENDING AORTA DILATATION (H): ICD-10-CM

## 2024-02-08 DIAGNOSIS — I42.2 HYPERTROPHIC CARDIOMYOPATHY (H): ICD-10-CM

## 2024-02-08 DIAGNOSIS — I25.10 CORONARY ARTERY DISEASE INVOLVING NATIVE CORONARY ARTERY OF NATIVE HEART WITHOUT ANGINA PECTORIS: ICD-10-CM

## 2024-02-08 DIAGNOSIS — I10 BENIGN ESSENTIAL HYPERTENSION: ICD-10-CM

## 2024-02-08 DIAGNOSIS — B18.2 CHRONIC HEPATITIS C WITHOUT HEPATIC COMA (H): ICD-10-CM

## 2024-02-08 DIAGNOSIS — N52.02 CORPORO-VENOUS OCCLUSIVE ERECTILE DYSFUNCTION: ICD-10-CM

## 2024-02-08 DIAGNOSIS — F33.0 MDD (MAJOR DEPRESSIVE DISORDER), RECURRENT EPISODE, MILD (H): ICD-10-CM

## 2024-02-08 DIAGNOSIS — R97.20 ELEVATED PROSTATE SPECIFIC ANTIGEN (PSA): ICD-10-CM

## 2024-02-08 LAB — HBA1C MFR BLD: 8.5 % (ref 0–5.6)

## 2024-02-08 PROCEDURE — 82570 ASSAY OF URINE CREATININE: CPT | Performed by: FAMILY MEDICINE

## 2024-02-08 PROCEDURE — 82043 UR ALBUMIN QUANTITATIVE: CPT | Performed by: FAMILY MEDICINE

## 2024-02-08 PROCEDURE — 80061 LIPID PANEL: CPT | Performed by: FAMILY MEDICINE

## 2024-02-08 PROCEDURE — 83036 HEMOGLOBIN GLYCOSYLATED A1C: CPT | Performed by: FAMILY MEDICINE

## 2024-02-08 PROCEDURE — 36415 COLL VENOUS BLD VENIPUNCTURE: CPT | Performed by: FAMILY MEDICINE

## 2024-02-08 PROCEDURE — G0103 PSA SCREENING: HCPCS | Performed by: FAMILY MEDICINE

## 2024-02-08 PROCEDURE — 99214 OFFICE O/P EST MOD 30 MIN: CPT | Performed by: FAMILY MEDICINE

## 2024-02-08 RX ORDER — GLIPIZIDE 5 MG/1
5 TABLET, FILM COATED, EXTENDED RELEASE ORAL
Qty: 180 TABLET | Refills: 1 | Status: SHIPPED | OUTPATIENT
Start: 2024-02-08

## 2024-02-08 RX ORDER — AMLODIPINE BESYLATE 10 MG/1
10 TABLET ORAL DAILY
Qty: 90 TABLET | Refills: 1 | Status: SHIPPED | OUTPATIENT
Start: 2024-02-08 | End: 2024-09-19

## 2024-02-08 RX ORDER — RESPIRATORY SYNCYTIAL VIRUS VACCINE 120MCG/0.5
0.5 KIT INTRAMUSCULAR ONCE
Qty: 1 EACH | Refills: 0 | Status: CANCELLED | OUTPATIENT
Start: 2024-02-08 | End: 2024-02-08

## 2024-02-08 RX ORDER — ESCITALOPRAM OXALATE 10 MG/1
10 TABLET ORAL DAILY
Qty: 90 TABLET | Refills: 1 | Status: SHIPPED | OUTPATIENT
Start: 2024-02-08 | End: 2024-09-03

## 2024-02-08 RX ORDER — SILDENAFIL 100 MG/1
100 TABLET, FILM COATED ORAL DAILY PRN
Qty: 12 TABLET | Refills: 11 | Status: SHIPPED | OUTPATIENT
Start: 2024-02-08

## 2024-02-08 RX ORDER — NITROGLYCERIN 0.4 MG/1
0.4 TABLET SUBLINGUAL EVERY 5 MIN PRN
Qty: 20 TABLET | Refills: 1 | Status: SHIPPED | OUTPATIENT
Start: 2024-02-08

## 2024-02-08 ASSESSMENT — PAIN SCALES - GENERAL: PAINLEVEL: NO PAIN (0)

## 2024-02-08 NOTE — PROGRESS NOTES
Assessment and Plan    (J43.1) Panlobular emphysema (H)  (primary encounter diagnosis)  Comment: stable, no concerns, not using medications  Plan:     (I25.10) Coronary artery disease involving native coronary artery of native heart without angina pectoris  Comment: stable, refill meds, annual labs  Plan: Lipid panel reflex to direct LDL Non-fasting,         nitroGLYcerin (NITROSTAT) 0.4 MG sublingual         tablet            (E11.65) Type 2 diabetes mellitus with hyperglycemia, without long-term current use of insulin (H)  Comment: A1c up a bit, will increase glipizide to 5mg BID  Plan: Albumin Random Urine Quantitative with Creat         Ratio, HEMOGLOBIN A1C, glipiZIDE (GLUCOTROL XL)        5 MG 24 hr tablet            (F32.0) Mild major depression, recurrent episode (H24)  Comment: mood stable, refilling meds  Plan: escitalopram (LEXAPRO) 10 MG tablet            (I77.810) Ascending aorta dilatation (H24)  Comment: has not had evaluated in a while, will get US  Plan: US Abdominal Aorta Imaging            (I42.2) Hypertrophic cardiomyopathy (H)  Comment: is not following with cards, no current sx, repeating echo to monitor  Plan: Echocardiogram Complete            (B18.2) Chronic hepatitis C without hepatic coma (H)  Comment:   Plan:     (R97.20) Elevated prostate specific antigen (PSA)  Comment: PSA last fall moderately elevated, repeating and will refer to uro if still increasing  Plan: PSA, screen            (N52.02) Corporo-venous occlusive erectile dysfunction  Comment:   Plan: sildenafil (VIAGRA) 100 MG tablet            (I10) Benign essential hypertension  Comment: BP well controlled  Plan: amLODIPine (NORVASC) 10 MG tablet            (Z12.5) Encounter for screening for malignant neoplasm of prostate  Comment: playing games for Medicare  Plan: PSA, screen              RTC in     Darrin Mendez MD      Dali Beckman is a 68 year old, presenting for the following health issues:  Diabetes, Lipids, and  "Hypertension        2/8/2024    12:35 PM   Additional Questions   Roomed by MR     History of Present Illness       Diabetes:   He presents for follow up of diabetes.  He is checking home blood glucose a few times a month.   He checks blood glucose before meals.  Blood glucose is never over 200 and never under 70. He is aware of hypoglycemia symptoms including dizziness.    He has no concerns regarding his diabetes at this time.  He is having numbness in feet, burning in feet, excessive thirst and blurry vision.              Hyperlipidemia Follow-Up    Are you regularly taking any medication or supplement to lower your cholesterol?   Yes- atorvastatin  Are you having muscle aches or other side effects that you think could be caused by your cholesterol lowering medication?  No    Hypertension Follow-up    Do you check your blood pressure regularly outside of the clinic? No   Are you following a low salt diet? Yes  Are your blood pressures ever more than 140 on the top number (systolic) OR more   than 90 on the bottom number (diastolic), for example 140/90? unknown    Medication Followup of Ativan 0.5 mg  Taking Medication as prescribed: Doesn't use that often. Only uses when he feels blood pressure goes up   Side Effects:  None  Medication Helping Symptoms:  yes    Has not been using SGTL2i due to cost.  But insurance has changed.  Admits that he's \"not a very diabetic\" and has a real sweet tooth.  Walks  daily.    Is not following with cardiology.    Hasn't had an erection in many years.  No morning erections.      Reduced vape to lowest available dose.    Feels mood is well treated.  Escitalopram working well.  Uses Ativan rarely.  Still has 11 of the last 12.          Objective    Temp 97.9  F (36.6  C) (Oral)   Resp 14   Ht 1.816 m (5' 11.5\")   Wt 104.7 kg (230 lb 14.4 oz)   SpO2 97%   BMI 31.76 kg/m    Body mass index is 31.76 kg/m .  Physical Exam               Signed Electronically by: Darrin Acosta " MD [place]

## 2024-02-09 LAB
CHOLEST SERPL-MCNC: 130 MG/DL
CREAT UR-MCNC: 58.2 MG/DL
FASTING STATUS PATIENT QL REPORTED: NO
HDLC SERPL-MCNC: 34 MG/DL
LDLC SERPL CALC-MCNC: 50 MG/DL
MICROALBUMIN UR-MCNC: 32.9 MG/L
MICROALBUMIN/CREAT UR: 56.53 MG/G CR (ref 0–17)
NONHDLC SERPL-MCNC: 96 MG/DL
PSA SERPL DL<=0.01 NG/ML-MCNC: 6.52 NG/ML (ref 0–4.5)
TRIGL SERPL-MCNC: 230 MG/DL

## 2024-02-28 DIAGNOSIS — E78.5 HYPERLIPIDEMIA LDL GOAL <100: ICD-10-CM

## 2024-02-28 RX ORDER — ATORVASTATIN CALCIUM 40 MG/1
40 TABLET, FILM COATED ORAL DAILY
Qty: 90 TABLET | Refills: 2 | Status: SHIPPED | OUTPATIENT
Start: 2024-02-28

## 2024-02-28 RX ORDER — ATORVASTATIN CALCIUM 40 MG/1
40 TABLET, FILM COATED ORAL DAILY
Qty: 90 TABLET | Refills: 2 | OUTPATIENT
Start: 2024-02-28

## 2024-03-14 NOTE — PROGRESS NOTES
HPI    SUBJECTIVE:   Rk Longoria is a 63 year old male who presents to clinic today for the following health issues:        Hospital Follow-up Visit:    Hospital/Nursing Home/IP Rehab Facility: [facility]  Date of Admission: 9/21/18  Date of Discharge: 9/22/18  Reason(s) for Admission: ST elevation myocardial infarction            Problems taking medications regularly:  None       Medication changes since discharge: None       Problems adhering to non-medication therapy:  None    Summary of hospitalization:  [facility] discharge summary reviewed  Diagnostic Tests/Treatments reviewed.  Follow up needed: none  Other Healthcare Providers Involved in Patient s Care:         None  Update since discharge: improved.     Post Discharge Medication Reconciliation: discharge medications reconciled, continue medications without change.  Plan of care communicated with patient     Coding guidelines for this visit:  Type of Medical   Decision Making Face-to-Face Visit       within 7 Days of discharge Face-to-Face Visit        within 14 days of discharge   Moderate Complexity [id number]   High Complexity [id number]          Has switched to e-cig, smoking less but still using this.  Was given 7mg patches, not helpful.  Was smoking about 2 ppd.  Also some nicorette gum.      Increased dose of lisinopril, would like to use 4 x 2.5 mg to use these up.     Does have f/u with card 10/9.  May need a second stent but they will monitor how things will heal.  Wondering about doing cardiac rehab and some follow up.  No current CP, dsypnea, edema, cough.    Review of Systems   Constitutional: Negative.    Respiratory: Negative.    Cardiovascular: Negative.    Genitourinary: Negative.          Physical Exam   Constitutional: He is oriented to person, place, and time and well-developed, well-nourished, and in no distress.   Eyes: Conjunctivae and EOM are normal.   Cardiovascular: Normal rate, regular rhythm and  normal heart sounds.    Pulmonary/Chest: Effort normal and breath sounds normal.   Musculoskeletal: He exhibits no edema.   Neurological: He is alert and oriented to person, place, and time.   Skin: Skin is warm and dry.   Vitals reviewed.      (Z09) Hospital discharge follow-up  (primary encounter diagnosis)  Comment: stable, doing well,  Plan: has card f/u 9d    (Z23) Need for prophylactic vaccination against Streptococcus pneumoniae (pneumococcus)  Comment:   Plan:     (F17.200) Tobacco use disorder  Comment: will bump up dose slightly, but need to be cautious as this can strain the heart  Plan: nicotine (NICODERM CQ) 14 MG/24HR 24 hr patch            (I21.11) ST elevation myocardial infarction involving right coronary artery (H)  Comment:   Plan:       RTC in 1m    Darrin Mendez MD       lives in supportive housing, on disability, close to sisters Susannah and Brittany ([phone])

## 2024-04-14 DIAGNOSIS — F32.0 MILD MAJOR DEPRESSION (H): ICD-10-CM

## 2024-04-16 RX ORDER — LORAZEPAM 0.5 MG/1
0.5 TABLET ORAL EVERY 6 HOURS
Qty: 12 TABLET | Refills: 0 | Status: SHIPPED | OUTPATIENT
Start: 2024-04-16 | End: 2024-08-08

## 2024-05-14 ENCOUNTER — TELEPHONE (OUTPATIENT)
Dept: CARDIOLOGY | Facility: CLINIC | Age: 69
End: 2024-05-14

## 2024-05-14 ENCOUNTER — OFFICE VISIT (OUTPATIENT)
Dept: FAMILY MEDICINE | Facility: CLINIC | Age: 69
End: 2024-05-14
Attending: FAMILY MEDICINE
Payer: MEDICARE

## 2024-05-14 VITALS
SYSTOLIC BLOOD PRESSURE: 163 MMHG | WEIGHT: 228 LBS | OXYGEN SATURATION: 97 % | TEMPERATURE: 97.9 F | BODY MASS INDEX: 30.88 KG/M2 | RESPIRATION RATE: 16 BRPM | HEIGHT: 72 IN | HEART RATE: 82 BPM | DIASTOLIC BLOOD PRESSURE: 85 MMHG

## 2024-05-14 DIAGNOSIS — I10 BENIGN ESSENTIAL HYPERTENSION: ICD-10-CM

## 2024-05-14 DIAGNOSIS — J43.1 PANLOBULAR EMPHYSEMA (H): ICD-10-CM

## 2024-05-14 DIAGNOSIS — Z23 NEED FOR SHINGLES VACCINE: ICD-10-CM

## 2024-05-14 DIAGNOSIS — E11.65 TYPE 2 DIABETES MELLITUS WITH HYPERGLYCEMIA, WITHOUT LONG-TERM CURRENT USE OF INSULIN (H): Primary | ICD-10-CM

## 2024-05-14 DIAGNOSIS — Z23 NEED FOR TDAP VACCINATION: ICD-10-CM

## 2024-05-14 LAB
HBA1C MFR BLD: 7.4 % (ref 0–5.6)
HOLD SPECIMEN: NORMAL
HOLD SPECIMEN: NORMAL

## 2024-05-14 PROCEDURE — 83036 HEMOGLOBIN GLYCOSYLATED A1C: CPT | Performed by: FAMILY MEDICINE

## 2024-05-14 PROCEDURE — 99214 OFFICE O/P EST MOD 30 MIN: CPT | Performed by: FAMILY MEDICINE

## 2024-05-14 PROCEDURE — 36415 COLL VENOUS BLD VENIPUNCTURE: CPT | Performed by: FAMILY MEDICINE

## 2024-05-14 RX ORDER — OLMESARTAN MEDOXOMIL 20 MG/1
20 TABLET ORAL DAILY
Qty: 90 TABLET | Refills: 1 | Status: SHIPPED | OUTPATIENT
Start: 2024-05-14

## 2024-05-14 ASSESSMENT — PATIENT HEALTH QUESTIONNAIRE - PHQ9
SUM OF ALL RESPONSES TO PHQ QUESTIONS 1-9: 0
SUM OF ALL RESPONSES TO PHQ QUESTIONS 1-9: 0
10. IF YOU CHECKED OFF ANY PROBLEMS, HOW DIFFICULT HAVE THESE PROBLEMS MADE IT FOR YOU TO DO YOUR WORK, TAKE CARE OF THINGS AT HOME, OR GET ALONG WITH OTHER PEOPLE: NOT DIFFICULT AT ALL

## 2024-05-14 NOTE — TELEPHONE ENCOUNTER
Please be advised that we have attempted to reach this patient to schedule Cardiac Testing however two attempts have been made to contact patient and they have not returned our call.  No further attempts to reach this patient will by made by scheduling team.  Please contact this patient to encourage them to call our office at [phone] and schedule this order if it is still clinically recommended.      Thank you for allowing [facility] to participate in this patients healthcare needs.

## 2024-05-14 NOTE — PROGRESS NOTES
"  Assessment and Plan    (E11.65) Type 2 diabetes mellitus with hyperglycemia, without long-term current use of insulin (H)  (primary encounter diagnosis)  Comment: Much improved, continue current plan  Plan: HEMOGLOBIN A1C, olmesartan (BENICAR) 20 MG         tablet            (Z23) Need for shingles vaccine  Comment: Advised to get vaccination through pharmacy   Plan:     (Z23) Need for Tdap vaccination  Comment: Advised to get vaccination through pharmacy   Plan:     (J43.1) Panlobular emphysema (H)  Comment: stable, no concerns today  Plan:     (I10) Benign essential hypertension  Comment: BP has been running a bit high.  Swtiching lisonpril to olmesartan for longer duration, also advised to collect home readings  Plan: Home Blood Pressure Monitor Order for DME -         ONLY FOR DME              RTC in     Darrin Mendez MD      Dali Beckman is a 68 year old, presenting for the following health issues:  Diabetes        5/14/2024     1:21 PM   Additional Questions   Roomed by daphnie     History of Present Illness       Diabetes:   He presents for follow up of diabetes.    He is not checking blood glucose.         He has no concerns regarding his diabetes at this time.  He is having numbness in feet and excessive thirst.  The patient has not had a diabetic eye exam in the last 12 months.          Tolerating medication well, feeling well. Feeling well.    Denies CP, palpitations, edema, dyspnea, HA, vision changes.           Objective    BP (!) 163/85   Pulse 82   Temp 97.9  F (36.6  C)   Resp 16   Ht 1.816 m (5' 11.5\")   Wt 103.4 kg (228 lb)   SpO2 97%   BMI 31.36 kg/m    Body mass index is 31.36 kg/m .  Physical Exam  Vitals and nursing note reviewed.   HENT:      Head: Normocephalic.   Eyes:      Conjunctiva/sclera: Conjunctivae normal.   Cardiovascular:      Rate and Rhythm: Normal rate and regular rhythm.      Heart sounds: Normal heart sounds.   Pulmonary:      Effort: Pulmonary effort is normal.      " Breath sounds: Normal breath sounds.   Skin:     General: Skin is warm and dry.   Neurological:      General: No focal deficit present.      Mental Status: He is alert and oriented to person, place, and time.   Psychiatric:         Mood and Affect: Mood normal.         Behavior: Behavior normal.              Signed Electronically by: Darrin Mendez MD

## 2024-05-30 DIAGNOSIS — G47.00 INSOMNIA, UNSPECIFIED TYPE: ICD-10-CM

## 2024-05-30 RX ORDER — TRAZODONE HYDROCHLORIDE 50 MG/1
50 TABLET, FILM COATED ORAL AT BEDTIME
Qty: 90 TABLET | Refills: 0 | Status: SHIPPED | OUTPATIENT
Start: 2024-05-30 | End: 2024-09-11

## 2024-06-24 ENCOUNTER — TELEPHONE (OUTPATIENT)
Dept: FAMILY MEDICINE | Facility: CLINIC | Age: 69
End: 2024-06-24
Payer: MEDICARE

## 2024-06-24 NOTE — CONFIDENTIAL NOTE
Patient Quality Outreach    Patient is due for the following:   Diabetes -  Eye Exam  IVD  -  BP Check    Next Steps:   Patient has upcoming appointment, these items will be addressed at that time.    Type of outreach:    Sent letter.      Questions for provider review:    None           Poncho Lucia MA

## 2024-06-24 NOTE — LETTER
[facility]  [address] [id number] [phone]       July 8, 2024    Rk Longoria  [address]  [place] [address]    Dear Rk,    We care about your health and have reviewed your health plan and are making recommendations based on this review, to optimize your health.    You are in particular need of attention regarding:  -Diabetes  -Coronary Artery and/or Vascular Disease  -Colon Cancer Screening    We are recommending that you:  -schedule a NURSE-ONLY BLOOD PRESSURE APPOINTMENT within the next 1-4 weeks.    -schedule a COLONOSCOPY to look for colon cancer (due every 10 years or 5 years in higher risk situations.)        Colon cancer is now the second leading cause of cancer-related deaths in the [place] for both men and women and there are over 130,000 new cases and 50,000 deaths per year from colon cancer.  Colonoscopies can prevent 90-95% of these deaths.  Problem lesions can be removed before they ever become cancer.  This test is not only looking for cancer, but also getting rid of precancerious lesions.    If you are under/uninsured, we recommend you contact the [employer] program. [employer] is a free colorectal cancer screening program that provides colonoscopies for eligible under/uninsured [place] men and women. If you are interested in receiving a free colonoscopy, please call [employer] at [phone] (mention code [employer]) to see if you re eligible.      If you do not wish to do a colonoscopy or cannot afford to do one, at this time, there is another option. It is called a FIT test or Fecal Immunochemical Occult Blood Test (take home stool sample kit).  It does not replace the colonoscopy for colorectal cancer screening, but it can detect hidden bleeding in the lower colon.  It does need to be repeated every year and if a positive result is obtained, you would be referred for a colonoscopy.          If you have completed either one  of these tests at another facility, please call with the details of when and where the tests were done and if they were normal or not. Or have the records sent to our clinic so that we can best coordinate your care.    -Diabetic Eye Exam is due.  If this was done within the last 12 months then please contact the clinic with the date and location so we can update your records.    In addition, here is a list of due or overdue Health Maintenance reminders.    Health Maintenance Due   Topic Date Due    Breathing Capacity Test  Never done    COPD Action Plan  Never done    Depression Action Plan  Never done    Colorectal Cancer Screening  Never done    Hepatitis A Vaccine (1 of 2 - Risk 2-dose series) Never done    Zoster (Shingles) Vaccine (1 of 2) Never done    Hepatitis B Vaccine (1 of 3 - Risk 3-dose series) Never done    RSV VACCINE (Pregnancy & 60+) (1 - 1-dose 60+ series) Never done    LUNG CANCER SCREENING  11/26/2019    AORTIC ANEURYSM SCREENING (SYSTEM ASSIGNED)  Never done    Pneumococcal Vaccine (3 of 3 - PPSV23 or PCV20) 10/30/2023    Diptheria Tetanus Pertussis (DTAP/TDAP/TD) Vaccine (2 - Td or Tdap) 01/08/2024    COVID-19 Vaccine (6 - 2023-24 season) 02/26/2024    Eye Exam  06/01/2024       To address the above recommendations, we encourage you to contact us at [phone], via [email or web address] or by contacting Central Scheduling toll free at [phone] 24 hours a day. They will assist you with finding the most convenient time and location.    Thank you for trusting [facility] and we appreciate the opportunity to serve you.  We look forward to supporting your healthcare needs in the future.    Healthy Regards,    Your [facility] Team

## 2024-06-24 NOTE — LETTER
[facility] [place] [facility]  [address] [address] [id number] [phone]       June 24, 2024    Rk Longoria  [address] [place] [address]    Dear Rk,    We care about your health and have reviewed your health plan and are making recommendations based on this review, to optimize your health.    You are in particular need of attention regarding:  -Diabetes  -Coronary Artery and/or Vascular Disease    We are recommending that you:  -schedule a NURSE-ONLY BLOOD PRESSURE APPOINTMENT within the next 1-4 weeks.    -Diabetic Eye Exam is due.  If this was done within the last 12 months then please contact the clinic with the date and location so we can update your records.    In addition, here is a list of due or overdue Health Maintenance reminders.    Health Maintenance Due   Topic Date Due    Breathing Capacity Test  Never done    COPD Action Plan  Never done    Depression Action Plan  Never done    Colorectal Cancer Screening  Never done    Hepatitis A Vaccine (1 of 2 - Risk 2-dose series) Never done    Zoster (Shingles) Vaccine (1 of 2) Never done    Hepatitis B Vaccine (1 of 3 - Risk 3-dose series) Never done    RSV VACCINE (Pregnancy & 60+) (1 - 1-dose 60+ series) Never done    LUNG CANCER SCREENING  11/26/2019    AORTIC ANEURYSM SCREENING (SYSTEM ASSIGNED)  Never done    Pneumococcal Vaccine (3 of 3 - PPSV23 or PCV20) 10/30/2023    Diptheria Tetanus Pertussis (DTAP/TDAP/TD) Vaccine (2 - Td or Tdap) 01/08/2024    COVID-19 Vaccine (6 - 2023-24 season) 02/26/2024    Eye Exam  06/01/2024       To address the above recommendations, we encourage you to contact us at [phone], via ZEALER or by contacting Central Scheduling toll free at [phone] 24 hours a day. They will assist you with finding the most convenient time and location.    Thank you for trusting [facility] [place] [facility] and we appreciate the opportunity to serve you.  We look forward to supporting your  healthcare needs in the future.    Healthy Regards,    Your [facility] [place] Team

## 2024-07-08 NOTE — CONFIDENTIAL NOTE
Patient Quality Outreach    Patient is due for the following:   Diabetes -  Eye Exam  Colon Cancer Screening  IVD  -  BP Check    Next Steps:   Patient has upcoming appointment, these items will be addressed at that time.    Type of outreach:    Sent letter.    Next Steps:  Reach out within 90 days via Letter.    Max number of attempts reached: No. Will try again in 90 days if patient still on fail list.    Questions for provider review:    None           Poncho Lucia MA

## 2024-08-07 DIAGNOSIS — F32.0 MILD MAJOR DEPRESSION (H): ICD-10-CM

## 2024-08-08 RX ORDER — LORAZEPAM 0.5 MG/1
0.5 TABLET ORAL EVERY 6 HOURS
Qty: 12 TABLET | Refills: 0 | Status: SHIPPED | OUTPATIENT
Start: 2024-08-08

## 2024-08-08 NOTE — TELEPHONE ENCOUNTER
VINM requesting a call back for an appt. Two more attempts will be made.    Jenn Combs  Lead   [facility]

## 2024-09-01 DIAGNOSIS — F33.0 MDD (MAJOR DEPRESSIVE DISORDER), RECURRENT EPISODE, MILD (H): ICD-10-CM

## 2024-09-03 RX ORDER — ESCITALOPRAM OXALATE 10 MG/1
10 TABLET ORAL DAILY
Qty: 90 TABLET | Refills: 0 | Status: SHIPPED | OUTPATIENT
Start: 2024-09-03

## 2024-09-11 DIAGNOSIS — G47.00 INSOMNIA, UNSPECIFIED TYPE: ICD-10-CM

## 2024-09-11 RX ORDER — TRAZODONE HYDROCHLORIDE 50 MG/1
50 TABLET, FILM COATED ORAL AT BEDTIME
Qty: 90 TABLET | Refills: 1 | Status: SHIPPED | OUTPATIENT
Start: 2024-09-11

## 2024-09-18 DIAGNOSIS — I10 BENIGN ESSENTIAL HYPERTENSION: ICD-10-CM

## 2024-09-19 RX ORDER — AMLODIPINE BESYLATE 10 MG/1
10 TABLET ORAL DAILY
Qty: 30 TABLET | Refills: 0 | Status: SHIPPED | OUTPATIENT
Start: 2024-09-19

## 2024-09-24 ENCOUNTER — TELEPHONE (OUTPATIENT)
Dept: FAMILY MEDICINE | Facility: CLINIC | Age: 69
End: 2024-09-24
Payer: MEDICARE

## 2024-09-24 NOTE — TELEPHONE ENCOUNTER
Sending Pt's Update to provider as a FYI.     Crys Elizondo   [facility]

## 2024-09-24 NOTE — TELEPHONE ENCOUNTER
FYI - Status Update    Who is Calling: patient    Update: Patient wanted to let provider know his bp is good, he is exercising and feels healthy and wanted to update provider. Patient only  needs a call if provider needs to speak with him    Does caller want a call/response back: Yes     Okay to leave a detailed message?: Yes at Cell number on file:    Telephone Information:   Mobile [phone]

## 2024-09-26 NOTE — TELEPHONE ENCOUNTER
LVM message requesting a call back for an appt. Two more attempts will be made.     Crys Elizondo   [facility]

## 2024-09-27 NOTE — TELEPHONE ENCOUNTER
Pt has been scheduled for his DM and HTN Follow up.     Crys Elizondo   [facility]

## 2024-10-03 ENCOUNTER — PATIENT OUTREACH (OUTPATIENT)
Dept: CARE COORDINATION | Facility: CLINIC | Age: 69
End: 2024-10-03
Payer: MEDICARE

## 2024-10-17 ENCOUNTER — PATIENT OUTREACH (OUTPATIENT)
Dept: CARE COORDINATION | Facility: CLINIC | Age: 69
End: 2024-10-17
Payer: MEDICARE

## 2024-10-21 DIAGNOSIS — I10 BENIGN ESSENTIAL HYPERTENSION: ICD-10-CM

## 2024-10-21 RX ORDER — AMLODIPINE BESYLATE 10 MG/1
10 TABLET ORAL DAILY
Qty: 30 TABLET | Refills: 0 | Status: SHIPPED | OUTPATIENT
Start: 2024-10-21 | End: 2024-11-01

## 2024-10-28 ENCOUNTER — TELEPHONE (OUTPATIENT)
Dept: FAMILY MEDICINE | Facility: CLINIC | Age: 69
End: 2024-10-28

## 2024-10-28 NOTE — TELEPHONE ENCOUNTER
Patient Quality Outreach    Patient is due for the following:   Diabetes -  Eye Exam  Colon Cancer Screening    Next Steps:   No follow up needed at this time.    Type of outreach:    Sent [employer] message.      Questions for provider review:    None           Susana Washington MA

## 2024-11-01 ENCOUNTER — OFFICE VISIT (OUTPATIENT)
Dept: FAMILY MEDICINE | Facility: CLINIC | Age: 69
End: 2024-11-01
Payer: MEDICARE

## 2024-11-01 VITALS
DIASTOLIC BLOOD PRESSURE: 72 MMHG | OXYGEN SATURATION: 96 % | RESPIRATION RATE: 16 BRPM | TEMPERATURE: 98 F | BODY MASS INDEX: 29.8 KG/M2 | HEART RATE: 81 BPM | WEIGHT: 220 LBS | SYSTOLIC BLOOD PRESSURE: 107 MMHG | HEIGHT: 72 IN

## 2024-11-01 DIAGNOSIS — E11.65 TYPE 2 DIABETES MELLITUS WITH HYPERGLYCEMIA, WITHOUT LONG-TERM CURRENT USE OF INSULIN (H): Primary | ICD-10-CM

## 2024-11-01 DIAGNOSIS — J43.1 PANLOBULAR EMPHYSEMA (H): ICD-10-CM

## 2024-11-01 DIAGNOSIS — I10 BENIGN ESSENTIAL HYPERTENSION: ICD-10-CM

## 2024-11-01 DIAGNOSIS — M65.30 TRIGGER FINGER, ACQUIRED: ICD-10-CM

## 2024-11-01 DIAGNOSIS — F33.0 MDD (MAJOR DEPRESSIVE DISORDER), RECURRENT EPISODE, MILD (H): ICD-10-CM

## 2024-11-01 DIAGNOSIS — B18.2 CHRONIC HEPATITIS C WITHOUT HEPATIC COMA (H): ICD-10-CM

## 2024-11-01 LAB
ALBUMIN SERPL BCG-MCNC: 4.2 G/DL (ref 3.5–5.2)
ALP SERPL-CCNC: 72 U/L (ref 40–150)
ALT SERPL W P-5'-P-CCNC: 25 U/L (ref 0–70)
ANION GAP SERPL CALCULATED.3IONS-SCNC: 8 MMOL/L (ref 7–15)
AST SERPL W P-5'-P-CCNC: 27 U/L (ref 0–45)
BILIRUB DIRECT SERPL-MCNC: 0.28 MG/DL (ref 0–0.3)
BILIRUB SERPL-MCNC: 0.8 MG/DL
BUN SERPL-MCNC: 12.9 MG/DL (ref 8–23)
CALCIUM SERPL-MCNC: 9.9 MG/DL (ref 8.8–10.4)
CHLORIDE SERPL-SCNC: 99 MMOL/L (ref 98–107)
CREAT SERPL-MCNC: 0.85 MG/DL (ref 0.67–1.17)
EGFRCR SERPLBLD CKD-EPI 2021: >90 ML/MIN/1.73M2
EST. AVERAGE GLUCOSE BLD GHB EST-MCNC: 260 MG/DL
GLUCOSE SERPL-MCNC: 365 MG/DL (ref 70–99)
HBA1C MFR BLD: 10.7 % (ref 0–5.6)
HCO3 SERPL-SCNC: 28 MMOL/L (ref 22–29)
POTASSIUM SERPL-SCNC: 4.3 MMOL/L (ref 3.4–5.3)
PROT SERPL-MCNC: 7.3 G/DL (ref 6.4–8.3)
SODIUM SERPL-SCNC: 135 MMOL/L (ref 135–145)

## 2024-11-01 PROCEDURE — 83036 HEMOGLOBIN GLYCOSYLATED A1C: CPT | Performed by: FAMILY MEDICINE

## 2024-11-01 PROCEDURE — 99214 OFFICE O/P EST MOD 30 MIN: CPT | Performed by: FAMILY MEDICINE

## 2024-11-01 PROCEDURE — 82248 BILIRUBIN DIRECT: CPT | Performed by: FAMILY MEDICINE

## 2024-11-01 PROCEDURE — 36415 COLL VENOUS BLD VENIPUNCTURE: CPT | Performed by: FAMILY MEDICINE

## 2024-11-01 PROCEDURE — 80053 COMPREHEN METABOLIC PANEL: CPT | Performed by: FAMILY MEDICINE

## 2024-11-01 RX ORDER — AMLODIPINE BESYLATE 10 MG/1
10 TABLET ORAL DAILY
Qty: 30 TABLET | Refills: 0 | Status: SHIPPED | OUTPATIENT
Start: 2024-11-01

## 2024-11-01 RX ORDER — GLIPIZIDE 5 MG/1
5 TABLET, FILM COATED, EXTENDED RELEASE ORAL
Qty: 180 TABLET | Refills: 1 | Status: SHIPPED | OUTPATIENT
Start: 2024-11-01

## 2024-11-01 RX ORDER — ESCITALOPRAM OXALATE 10 MG/1
10 TABLET ORAL DAILY
Qty: 90 TABLET | Refills: 0 | Status: SHIPPED | OUTPATIENT
Start: 2024-11-01

## 2024-11-01 RX ORDER — METFORMIN HYDROCHLORIDE 500 MG/1
1000 TABLET, EXTENDED RELEASE ORAL 2 TIMES DAILY WITH MEALS
Qty: 360 TABLET | Refills: 1 | Status: SHIPPED | OUTPATIENT
Start: 2024-11-01

## 2024-11-01 ASSESSMENT — PATIENT HEALTH QUESTIONNAIRE - PHQ9
SUM OF ALL RESPONSES TO PHQ QUESTIONS 1-9: 0
5. POOR APPETITE OR OVEREATING: NOT AT ALL

## 2024-11-01 ASSESSMENT — ANXIETY QUESTIONNAIRES
7. FEELING AFRAID AS IF SOMETHING AWFUL MIGHT HAPPEN: NOT AT ALL
6. BECOMING EASILY ANNOYED OR IRRITABLE: NOT AT ALL
GAD7 TOTAL SCORE: 0
GAD7 TOTAL SCORE: 0
3. WORRYING TOO MUCH ABOUT DIFFERENT THINGS: NOT AT ALL
5. BEING SO RESTLESS THAT IT IS HARD TO SIT STILL: NOT AT ALL
2. NOT BEING ABLE TO STOP OR CONTROL WORRYING: NOT AT ALL
IF YOU CHECKED OFF ANY PROBLEMS ON THIS QUESTIONNAIRE, HOW DIFFICULT HAVE THESE PROBLEMS MADE IT FOR YOU TO DO YOUR WORK, TAKE CARE OF THINGS AT HOME, OR GET ALONG WITH OTHER PEOPLE: NOT DIFFICULT AT ALL
1. FEELING NERVOUS, ANXIOUS, OR ON EDGE: NOT AT ALL

## 2024-11-01 ASSESSMENT — PAIN SCALES - GENERAL: PAINLEVEL_OUTOF10: NO PAIN (0)

## 2024-11-01 NOTE — PROGRESS NOTES
Assessment and Plan    (E11.65) Type 2 diabetes mellitus with hyperglycemia, without long-term current use of insulin (H)  (primary encounter diagnosis)  Comment: Consider adding Jardiance (empagliflozin) for improved blood sugar control, weight loss, and cardiovascular and renal protection, pending insurance coverage. Patient to communicate via [employer] about medication affordability. Encourage reaching out if medication cost becomes a concern. Follow-up in 3 months to review A1c and medication efficacy.  Recommend exploring supplemental coverage options to enhance medication affordability.  Plan: OPTOMETRY REFERRAL, empagliflozin (JARDIANCE)         10 MG TABS tablet, glipiZIDE (GLUCOTROL XL) 5         MG 24 hr tablet, metFORMIN (GLUCOPHAGE XR) 500         MG 24 hr tablet            (J43.1) Panlobular emphysema (H)  Comment: stable, no meds  Plan:     (I10) Benign essential hypertension  Comment: Switch to losartan for cost-effectiveness and continue blood pressure monitoring. Schedule a follow-up in 1 months to evaluate blood pressure control with new medication.  Plan: BASIC METABOLIC PANEL, amLODIPine (NORVASC) 10         MG tablet            (F33.0) MDD (major depressive disorder), recurrent episode, mild (H)  Comment: mood stable, no concerns.  Plan: escitalopram (LEXAPRO) 10 MG tablet            (M65.30) Trigger finger, acquired  Comment:  Consider referral to a hand specialist if no improvement or worsening.  Plan:     (B18.2) Chronic hepatitis C without hepatic coma (H)  Comment:  Encourage discussion with a gastroenterologist about treatment options, highlighting the availability of curative treatments.  Monitoring liver health  Plan: Hepatic panel (Albumin, ALT, AST, Bili, Alk         Phos, TP)              RTC in 1m BP check    Darrin Mendez MD     Dali Beckman is a 69 year old, presenting for the following health issues:  Diabetes and Hypertension        11/1/2024    10:48 AM   Additional  "Questions   Roomed by dante white   Accompanied by self         11/1/2024    10:48 AM   Patient Reported Additional Medications   Patient reports taking the following new medications na     History of Present Illness       Diabetes:   He presents for follow up of diabetes.    He is not checking blood glucose.         He has no concerns regarding his diabetes at this time.  He is having numbness in feet, burning in feet, excessive thirst and blurry vision.  The patient has not had a diabetic eye exam in the last 12 months.          He eats 2-3 servings of fruits and vegetables daily.He consumes 3 sweetened beverage(s) daily.He exercises with enough effort to increase his heart rate 30 to 60 minutes per day.  He exercises with enough effort to increase his heart rate 7 days per week.   He is taking medications regularly.     Rk reports that he stopped taking glipizide due to its high cost of $45. He is currently on Medicare without a supplemental plan. Rk mentions that his eyesight is getting worse and he plans to get an eye test. He has a history of double cataract.    Rk is experiencing issues with his hands, specifically his ring finger, which keeps locking up and prevents him from making a fist.     Rk has not completed treatment for hepatitis C and considers it a lower priority compared to his other health issues.      Objective    /72   Pulse 81   Temp 98  F (36.7  C) (Oral)   Resp 16   Ht 1.816 m (5' 11.5\")   Wt 99.8 kg (220 lb)   SpO2 96%   BMI 30.26 kg/m    Body mass index is 30.26 kg/m .  Physical Exam  Vitals and nursing note reviewed.   HENT:      Head: Normocephalic.   Eyes:      Conjunctiva/sclera: Conjunctivae normal.   Cardiovascular:      Rate and Rhythm: Normal rate and regular rhythm.      Heart sounds: Normal heart sounds.   Pulmonary:      Effort: Pulmonary effort is normal.      Breath sounds: Normal breath sounds.   Skin:     General: Skin is warm and dry.   Neurological:      " General: No focal deficit present.      Mental Status: He is alert and oriented to person, place, and time.   Psychiatric:         Mood and Affect: Mood normal.         Behavior: Behavior normal.              Signed Electronically by: Darrin Mendez MD

## 2024-11-14 ENCOUNTER — OFFICE VISIT (OUTPATIENT)
Dept: OPTOMETRY | Facility: CLINIC | Age: 69
End: 2024-11-14
Payer: MEDICARE

## 2024-11-14 DIAGNOSIS — H04.129 DRY EYE: ICD-10-CM

## 2024-11-14 DIAGNOSIS — H17.9 CORNEAL SCARRING: ICD-10-CM

## 2024-11-14 DIAGNOSIS — E11.9 TYPE 2 DIABETES MELLITUS WITHOUT RETINOPATHY (H): ICD-10-CM

## 2024-11-14 DIAGNOSIS — H25.13 NUCLEAR SCLEROSIS OF BOTH EYES: ICD-10-CM

## 2024-11-14 DIAGNOSIS — E11.65 TYPE 2 DIABETES MELLITUS WITH HYPERGLYCEMIA, WITHOUT LONG-TERM CURRENT USE OF INSULIN (H): ICD-10-CM

## 2024-11-14 DIAGNOSIS — H52.03 HYPEROPIA OF BOTH EYES: ICD-10-CM

## 2024-11-14 DIAGNOSIS — H52.4 PRESBYOPIA: Primary | ICD-10-CM

## 2024-11-14 PROCEDURE — 92004 COMPRE OPH EXAM NEW PT 1/>: CPT | Performed by: OPTOMETRIST

## 2024-11-14 PROCEDURE — 92015 DETERMINE REFRACTIVE STATE: CPT | Mod: GY | Performed by: OPTOMETRIST

## 2024-11-14 ASSESSMENT — REFRACTION_MANIFEST
OS_SPHERE: +1.50
OS_AXIS: 077
OD_ADD: +2.00
OS_CYLINDER: +1.00
OS_ADD: +2.00
OS_AXIS: 061
METHOD_AUTOREFRACTION: 1
OS_CYLINDER: +0.75
OS_SPHERE: +1.75
OD_SPHERE: -0.50
OD_SPHERE: BALANCE
OD_AXIS: 081
OD_CYLINDER: +4.75

## 2024-11-14 ASSESSMENT — CUP TO DISC RATIO
OS_RATIO: 0.35
OD_RATIO: 0.45

## 2024-11-14 ASSESSMENT — CONF VISUAL FIELD
OS_SUPERIOR_TEMPORAL_RESTRICTION: 0
OD_INFERIOR_TEMPORAL_RESTRICTION: 0
OD_SUPERIOR_TEMPORAL_RESTRICTION: 0
OS_SUPERIOR_NASAL_RESTRICTION: 0
OD_SUPERIOR_NASAL_RESTRICTION: 0
OS_INFERIOR_NASAL_RESTRICTION: 0
OD_INFERIOR_NASAL_RESTRICTION: 0
OS_NORMAL: 1
METHOD: COUNTING FINGERS
OD_NORMAL: 1
OS_INFERIOR_TEMPORAL_RESTRICTION: 0

## 2024-11-14 ASSESSMENT — VISUAL ACUITY
OS_PH_SC: 20/40
OD_SC: 20/200
OD_PH_SC: 20/60
OS_SC: 20/200
METHOD: SNELLEN - LINEAR
OS_SC: 20/200
OD_SC: 20/200

## 2024-11-14 ASSESSMENT — TONOMETRY
OD_IOP_MMHG: 18
OS_IOP_MMHG: 18
IOP_METHOD: APPLANATION

## 2024-11-14 ASSESSMENT — REFRACTION_WEARINGRX
SPECS_TYPE: READERS
OD_SPHERE: +3.25

## 2024-11-14 ASSESSMENT — EXTERNAL EXAM - RIGHT EYE: OD_EXAM: NORMAL

## 2024-11-14 ASSESSMENT — EXTERNAL EXAM - LEFT EYE: OS_EXAM: NORMAL

## 2024-11-14 ASSESSMENT — SLIT LAMP EXAM - LIDS
COMMENTS: NORMAL
COMMENTS: NORMAL

## 2024-11-14 NOTE — PATIENT INSTRUCTIONS
Patient Education   Diabetes weakens the blood vessels all over the body, including the eyes. Damage to the blood vessels in the eyes can cause swelling or bleeding into part of the eye (called the retina). This is called diabetic retinopathy (ALDAIR-tin--pu-thee). If not treated, this disease can cause vision loss or blindness.   Symptoms may include blurred or distorted vision, but many people have no symptoms. It's important to see your eye doctor regularly to check for problems.   Early treatment and good control can help protect your vision. Here are the things you can do to help prevent vision loss:      1. Keep your blood sugar levels under tight control.      2. Bring high blood pressure under control.      3. No smoking.      4. Have yearly dilated eye exams.    Cataract much worse in R eye  Vision improved to 20/20 with glasses in left eye

## 2024-11-14 NOTE — PROGRESS NOTES
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  readers    Distance Vision Acuity: Noticed gradual change in both eyes    Near Vision Acuity: Not satisfied in readers     Eye Comfort: dry  Do you use eye drops? : lubricant as needed       Rebeka Garcia - Optometric Assistant      Medical, surgical and family histories reviewed and updated 11/14/2024.       OBJECTIVE: See Ophthalmology exam    ASSESSMENT:    ICD-10-CM    1. Presbyopia  H52.4 REFRACTION      2. Type 2 diabetes mellitus with hyperglycemia, without long-term current use of insulin (H)  E11.65 OPTOMETRY REFERRAL     EYE EXAM (SIMPLE-NONBILLABLE)      3. Hyperopia of both eyes  H52.03 REFRACTION      4. Nuclear sclerosis of both eyes  H25.13       5. Dry eye  H04.129       6. Type 2 diabetes mellitus without retinopathy (H)  E11.9       7. Corneal scarring  H17.9           PLAN:  Bifocal or distance prescription and continue w/ over the counter readers   Ongoing glucose control  Monitor cataracts   Rk Longoria aware eye exam results will be sent to Darrin Mendez.    Bhavna Mays OD

## 2024-11-14 NOTE — LETTER
11/14/2024      Rk Longoria  [address] [place] [address]      Dear Colleague,    Thank you for referring your patient, Rk Longoria, to the [facility] [place]. Please see a copy of my visit note below.    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  readers    Distance Vision Acuity: Noticed gradual change in both eyes    Near Vision Acuity: Not satisfied in readers     Eye Comfort: dry  Do you use eye drops? : lubricant as needed       Rebeka Garcia - Optometric Assistant      Medical, surgical and family histories reviewed and updated 11/14/2024.       OBJECTIVE: See Ophthalmology exam    ASSESSMENT:    ICD-10-CM    1. Presbyopia  H52.4 REFRACTION      2. Type 2 diabetes mellitus with hyperglycemia, without long-term current use of insulin (H)  E11.65 OPTOMETRY REFERRAL     EYE EXAM (SIMPLE-NONBILLABLE)      3. Hyperopia of both eyes  H52.03 REFRACTION      4. Nuclear sclerosis of both eyes  H25.13       5. Dry eye  H04.129       6. Type 2 diabetes mellitus without retinopathy (H)  E11.9       7. Corneal scarring  H17.9           PLAN:  Bifocal or distance prescription and continue w/ over the counter readers   Ongoing glucose control  Monitor cataracts   Rk Longoria aware eye exam  results will be sent to Darrin Mendez.    Bhavna Mays OD          Again, thank you for allowing me to participate in the care of your patient.        Sincerely,        Bhavna Mays, OD

## 2024-11-25 DIAGNOSIS — I10 BENIGN ESSENTIAL HYPERTENSION: ICD-10-CM

## 2024-11-25 RX ORDER — LISINOPRIL 40 MG/1
40 TABLET ORAL DAILY
Qty: 90 TABLET | Refills: 0 | Status: SHIPPED | OUTPATIENT
Start: 2024-11-25

## 2024-11-25 NOTE — TELEPHONE ENCOUNTER
Clinic RN: Please investigate patient's chart or contact patient if the information cannot be found because the medication is listed as historical or discontinued. Confirm patient is taking this medication. Document findings and route refill encounter to provider for approval or denial.    Colleen Thorpe RN  [facility]

## 2024-11-25 NOTE — TELEPHONE ENCOUNTER
Called patient to clarify why he was requesting a refill of lisinopril as this was discontinued in May.    Pt states he could not afford olmesartan so he restarted lisinopril that he had at home (Also takes amlodipine).     Notes from 11/1/24 office visit:  (I10) Benign essential hypertension  Comment: Switch to losartan for cost-effectiveness and continue blood pressure monitoring. Schedule a follow-up in 1 months to evaluate blood pressure control with new medication.  Plan: BASIC METABOLIC PANEL, amLODIPine (NORVASC) 10         MG tablet    Routing to Dr. Mendez-it does not look like losartan was sent to pharmacy. Did you want to send Losartan or would you like patient to continue lisinopril?    Lizzy Lamb RN on 11/25/2024 at 12:30 PM

## 2024-11-27 DIAGNOSIS — E78.5 HYPERLIPIDEMIA LDL GOAL <100: ICD-10-CM

## 2024-11-27 RX ORDER — ATORVASTATIN CALCIUM 40 MG/1
40 TABLET, FILM COATED ORAL DAILY
Qty: 90 TABLET | Refills: 0 | Status: SHIPPED | OUTPATIENT
Start: 2024-11-27

## 2024-12-04 DIAGNOSIS — F32.0 MILD MAJOR DEPRESSION (H): ICD-10-CM

## 2024-12-05 RX ORDER — LORAZEPAM 0.5 MG/1
0.5 TABLET ORAL EVERY 6 HOURS
Qty: 12 TABLET | Refills: 0 | Status: SHIPPED | OUTPATIENT
Start: 2024-12-05

## 2024-12-26 DIAGNOSIS — I10 BENIGN ESSENTIAL HYPERTENSION: ICD-10-CM

## 2024-12-26 RX ORDER — AMLODIPINE BESYLATE 10 MG/1
10 TABLET ORAL DAILY
Qty: 30 TABLET | Refills: 0 | Status: SHIPPED | OUTPATIENT
Start: 2024-12-26

## 2025-01-26 DIAGNOSIS — I10 BENIGN ESSENTIAL HYPERTENSION: ICD-10-CM

## 2025-01-27 RX ORDER — AMLODIPINE BESYLATE 10 MG/1
10 TABLET ORAL DAILY
Qty: 30 TABLET | Refills: 0 | Status: SHIPPED | OUTPATIENT
Start: 2025-01-27

## 2025-02-24 DIAGNOSIS — E78.5 HYPERLIPIDEMIA LDL GOAL <100: ICD-10-CM

## 2025-02-24 DIAGNOSIS — G47.00 INSOMNIA, UNSPECIFIED TYPE: ICD-10-CM

## 2025-02-24 DIAGNOSIS — I10 BENIGN ESSENTIAL HYPERTENSION: ICD-10-CM

## 2025-02-24 DIAGNOSIS — F33.0 MDD (MAJOR DEPRESSIVE DISORDER), RECURRENT EPISODE, MILD: ICD-10-CM

## 2025-02-24 DIAGNOSIS — F32.0 MILD MAJOR DEPRESSION: ICD-10-CM

## 2025-02-24 RX ORDER — ATORVASTATIN CALCIUM 40 MG/1
40 TABLET, FILM COATED ORAL DAILY
Qty: 90 TABLET | Refills: 0 | Status: SHIPPED | OUTPATIENT
Start: 2025-02-24

## 2025-02-24 RX ORDER — TRAZODONE HYDROCHLORIDE 50 MG/1
50 TABLET ORAL AT BEDTIME
Qty: 90 TABLET | Refills: 0 | Status: SHIPPED | OUTPATIENT
Start: 2025-02-24

## 2025-02-24 RX ORDER — LISINOPRIL 40 MG/1
40 TABLET ORAL DAILY
Qty: 90 TABLET | Refills: 0 | Status: SHIPPED | OUTPATIENT
Start: 2025-02-24

## 2025-02-24 RX ORDER — ESCITALOPRAM OXALATE 10 MG/1
10 TABLET ORAL DAILY
Qty: 90 TABLET | Refills: 0 | Status: SHIPPED | OUTPATIENT
Start: 2025-02-24

## 2025-02-24 RX ORDER — AMLODIPINE BESYLATE 10 MG/1
10 TABLET ORAL DAILY
Qty: 90 TABLET | Refills: 0 | Status: SHIPPED | OUTPATIENT
Start: 2025-02-24

## 2025-02-24 RX ORDER — LORAZEPAM 0.5 MG/1
0.5 TABLET ORAL EVERY 6 HOURS
Qty: 12 TABLET | Refills: 0 | Status: SHIPPED | OUTPATIENT
Start: 2025-02-24

## 2025-03-25 ENCOUNTER — TELEPHONE (OUTPATIENT)
Dept: FAMILY MEDICINE | Facility: CLINIC | Age: 70
End: 2025-03-25

## 2025-03-25 NOTE — LETTER
[facility]  [address] [address] [id number] [phone]       June 30, 2025    Rk Longoria  [address]  [place] [address]    Dear Rk,    We care about your health and have reviewed your health plan and are making recommendations based on this review, to optimize your health.    You are in particular need of attention regarding:  -Colon Cancer Screening    We are recommending that you:  -schedule a COLONOSCOPY to look for colon cancer (due every 10 years or 5 years in higher risk situations.)        Colon cancer is now the second leading cause of cancer-related deaths in the [place] for both men and women and there are over 130,000 new cases and 50,000 deaths per year from colon cancer.  Colonoscopies can prevent 90-95% of these deaths.  Problem lesions can be removed before they ever become cancer.  This test is not only looking for cancer, but also getting rid of precancerious lesions.    If you are under/uninsured, we recommend you contact the [employer] program. [employer] is a free colorectal cancer screening program that provides colonoscopies for eligible under/uninsured [place] men and women. If you are interested in receiving a free colonoscopy, please call [employer] at [phone] (mention code [employer]) to see if you re eligible.      If you do not wish to do a colonoscopy or cannot afford to do one, at this time, there is another option. It is called a FIT test or Fecal Immunochemical Occult Blood Test (take home stool sample kit).  It does not replace the colonoscopy for colorectal cancer screening, but it can detect hidden bleeding in the lower colon.  It does need to be repeated every year and if a positive result is obtained, you would be referred for a colonoscopy.          If you have completed either one of these tests at another facility, please call with the details of when and where the tests were done and if they were normal or  not. Or have the records sent to our clinic so that we can best coordinate your care.    In addition, here is a list of due or overdue Health Maintenance reminders.    Health Maintenance Due   Topic Date Due    Breathing Capacity Test  Never done    COPD Action Plan  Never done    Depression Action Plan  Never done    Hepatitis A Vaccine (1 of 2 - Risk 2-dose series) Never done    Zoster (Shingles) Vaccine (1 of 2) Never done    Hepatitis B Vaccine (1 of 3 - Risk 3-dose series) Never done    RSV Vaccine (1 - Risk 60-74 years 1-dose series) Never done    LUNG CANCER SCREENING  11/26/2019    AORTIC ANEURYSM SCREENING (SYSTEM ASSIGNED)  Never done    Diptheria Tetanus Pertussis (DTAP/TDAP/TD) Vaccine (2 - Td or Tdap) 01/08/2024    Diabetic Foot Exam  11/02/2024    COVID-19 Vaccine (7 - 2024-25 season) 04/07/2025    A1C Lab  06/06/2025    ANNUAL REVIEW OF HM ORDERS  11/01/2025       To address the above recommendations, we encourage you to contact us at [phone], via ANTERIOS or by contacting Central Scheduling toll free at [phone] 24 hours a day. They will assist you with finding the most convenient time and location.    Thank you for trusting [facility] and we appreciate the opportunity to serve you.  We look forward to supporting your healthcare needs in the future.    Healthy Regards,    Your [facility] Team

## 2025-03-25 NOTE — LETTER
[facility]  [address] [id number] [phone]       April 1, 2025    Rk Longoria  [address]  [place] [address]    Dear Rk,    We care about your health and have reviewed your health plan and are making recommendations based on this review, to optimize your health.    You are in particular need of attention regarding:  -Colon Cancer Screening    We are recommending that you:  -schedule a COLONOSCOPY to look for colon cancer (due every 10 years or 5 years in higher risk situations.)        Colon cancer is now the second leading cause of cancer-related deaths in the [place] for both men and women and there are over 130,000 new cases and 50,000 deaths per year from colon cancer.  Colonoscopies can prevent 90-95% of these deaths.  Problem lesions can be removed before they ever become cancer.  This test is not only looking for cancer, but also getting rid of precancerious lesions.    If you are under/uninsured, we recommend you contact the [employer] program. [employer] is a free colorectal cancer screening program that provides colonoscopies for eligible under/uninsured [place] men and women. If you are interested in receiving a free colonoscopy, please call [employer] at [phone] (mention code [employer]) to see if you re eligible.      If you do not wish to do a colonoscopy or cannot afford to do one, at this time, there is another option. It is called a FIT test or Fecal Immunochemical Occult Blood Test (take home stool sample kit).  It does not replace the colonoscopy for colorectal cancer screening, but it can detect hidden bleeding in the lower colon.  It does need to be repeated every year and if a positive result is obtained, you would be referred for a colonoscopy.          If you have completed either one of these tests at another facility, please call with the details of when and where the tests were done and if they were normal or  not. Or have the records sent to our clinic so that we can best coordinate your care.    In addition, here is a list of due or overdue Health Maintenance reminders.    Health Maintenance Due   Topic Date Due    Breathing Capacity Test  Never done    COPD Action Plan  Never done    Depression Action Plan  Never done    Hepatitis A Vaccine (1 of 2 - Risk 2-dose series) Never done    Zoster (Shingles) Vaccine (1 of 2) Never done    Hepatitis B Vaccine (1 of 3 - Risk 3-dose series) Never done    RSV VACCINE (1 - Risk 60-74 years 1-dose series) Never done    LUNG CANCER SCREENING  11/26/2019    AORTIC ANEURYSM SCREENING (SYSTEM ASSIGNED)  Never done    Diptheria Tetanus Pertussis (DTAP/TDAP/TD) Vaccine (2 - Td or Tdap) 01/08/2024    Diabetic Foot Exam  11/02/2024    COVID-19 Vaccine (7 - 2024-25 season) 04/07/2025       To address the above recommendations, we encourage you to contact us at [phone], via [employer] or by contacting Central Scheduling toll free at [phone] 24 hours a day. They will assist you with finding the most convenient time and location.    Thank you for trusting [facility] and we appreciate the opportunity to serve you.  We look forward to supporting your healthcare needs in the future.    Healthy Regards,    Your [facility] Team

## 2025-03-25 NOTE — CONFIDENTIAL NOTE
Patient Quality Outreach    Patient is due for the following:   Diabetes -  A1C  Colon Cancer Screening    Action(s) Taken:   No follow up needed at this time.    Type of outreach:    Sent [employer] message.    Questions for provider review:    None         Poncho Lucia MA  Chart routed to .

## 2025-04-01 NOTE — CONFIDENTIAL NOTE
Patient Quality Outreach    Patient is due for the following:   Colon Cancer Screening    Action(s) Taken:   No follow up needed at this time.    Type of outreach:    Sent letter.    Questions for provider review:    None         Poncho Lucia MA  Chart routed to .

## 2025-04-14 ENCOUNTER — TELEPHONE (OUTPATIENT)
Dept: FAMILY MEDICINE | Facility: CLINIC | Age: 70
End: 2025-04-14
Payer: MEDICARE

## 2025-04-14 NOTE — TELEPHONE ENCOUNTER
MTM referral from: [facility] visit (referral by provider)    MTM referral outreach attempt #2 on April 14, 2025 at 12:48 PM      Outcome: Patient not reachable after several attempts, routed to Pharmacist Team/Provider as an FYI    Use vbc for the carrier/Plan on the flowsheet      MTM Practitioner please send patient letter    Macarena Henry [place]   [phone]

## 2025-05-01 DIAGNOSIS — F32.0 MILD MAJOR DEPRESSION: ICD-10-CM

## 2025-05-05 ENCOUNTER — VIRTUAL VISIT (OUTPATIENT)
Dept: PHARMACY | Facility: CLINIC | Age: 70
End: 2025-05-05

## 2025-05-05 DIAGNOSIS — I25.10 CORONARY ARTERY DISEASE INVOLVING NATIVE CORONARY ARTERY OF NATIVE HEART WITHOUT ANGINA PECTORIS: ICD-10-CM

## 2025-05-05 DIAGNOSIS — G47.00 INSOMNIA: ICD-10-CM

## 2025-05-05 DIAGNOSIS — E78.5 HYPERLIPIDEMIA LDL GOAL <100: ICD-10-CM

## 2025-05-05 DIAGNOSIS — E11.65 TYPE 2 DIABETES MELLITUS WITH HYPERGLYCEMIA, WITHOUT LONG-TERM CURRENT USE OF INSULIN (H): Primary | ICD-10-CM

## 2025-05-05 DIAGNOSIS — F41.9 ANXIETY: ICD-10-CM

## 2025-05-05 DIAGNOSIS — I10 BENIGN ESSENTIAL HYPERTENSION: ICD-10-CM

## 2025-05-05 DIAGNOSIS — F32.0 MILD MAJOR DEPRESSION: ICD-10-CM

## 2025-05-05 DIAGNOSIS — Z78.9 TAKES DIETARY SUPPLEMENTS: ICD-10-CM

## 2025-05-05 PROCEDURE — 99207 PR NO CHARGE LOS: CPT | Mod: 95 | Performed by: PHARMACIST

## 2025-05-05 RX ORDER — GLIPIZIDE 10 MG/1
10 TABLET, FILM COATED, EXTENDED RELEASE ORAL DAILY
Qty: 90 TABLET | Refills: 1 | Status: SHIPPED | OUTPATIENT
Start: 2025-05-05

## 2025-05-05 RX ORDER — LORAZEPAM 1 MG/1
1 TABLET ORAL EVERY 6 HOURS
Qty: 12 TABLET | Refills: 0 | Status: SHIPPED | OUTPATIENT
Start: 2025-05-05

## 2025-05-05 RX ORDER — CALCIUM CARBONATE 500 MG/1
1 TABLET, CHEWABLE ORAL DAILY PRN
COMMUNITY

## 2025-05-05 NOTE — PATIENT INSTRUCTIONS
"Recommendations from today's MTM visit:                                                    MTM (medication therapy management) is a service provided by a clinical pharmacist designed to help you get the most of out of your medicines.   Today we reviewed what your medicines are for, how to know if they are working, that your medicines are safe and how to make your medicine regimen as easy as possible.      Try to increase metformin to 2 tablets in the morning but wait until you have a good meal. Continue the 2 tablets after dinner.  Start taking glipizide in the morning with first good meal and increase to 10 mg daily. You can take two of the 5 mg tablets together until gone and I will send a new prescription in for the 10 mg tablet so only take one daily when you get your new supply.  Contact the [place] Prescription Assistance Program at [phone] to start applications for Ozempic and Jardiance. If approved for Ozempic, this could take the place of glipizide and we would decrease this gradually as Ozempic is increased. Jardiance would be in addition to your other medicines as it works differently.  Try to apply for extra help through social security - https://www.ssa.gov/medicare/part-d-extra-help  Start checking blood sugars at least one a week. Alternate between fasting and bedtime.  Sign up for [employer] if you can. I emailed you a link to activate.    Follow-up: Return in 4 weeks (on 6/2/2025).    It was great speaking with you today.  I value your experience and would be very thankful for your time in providing feedback in our clinic survey. In the next few days, you may receive an email or text message from [facility] with a link to a survey related to your  clinical pharmacist.\"     To schedule another MTM appointment, please call the clinic directly or you may call the MTM scheduling line at [phone] or toll-free at [phone].     My Clinical Pharmacist's contact information:                      "                                 Please feel free to contact me with any questions or concerns you have.      Jessy Oleary, PharmD  Medication Therapy Management Pharmacist  [facility] - Monday and Wednesday 7:30 - 4:00

## 2025-05-05 NOTE — PROGRESS NOTES
Medication Therapy Management (MTM) Encounter    ASSESSMENT:                            Medication Adherence/Access: No issues identified.    Diabetes  Patient is not meeting A1c goal of < 7%.  Patient would benefit from reducing sweets however patient does not want to change his diet right now.  Suggested he meet with the diabetes educator and he declined at this time.  Encouraged him to continue with his walking.  Discussed using a continuous glucose monitor but patient declines today.  He does have a glucose monitor at home and would be willing to check once a week.  We discussed the cost of SGLT2's and GLP-1's.  Both medications would be too expensive for him at this time due to a tight budget and minimal income.  He would benefit from both Jardiance and Ozempic for diabetes, cardiovascular and renal.  He would be willing to apply for these medications through the manufacture to see if he qualifies.  I provided him with the prescription assistance office phone number so he can get that started.  In the meantime, with his A1c being elevated we will have him increase metformin to his prescribed dose and take with first large meal of the day to prevent diarrhea.  He would also benefit from increasing glipizide to 10 mg once daily, updated prescription sent today.  We discussed in detail the micro and macrovascular complications of diabetes and the importance of getting blood sugars under better control to prevent these complications.  Recommend he have a diabetic foot exam at next provider visit.    For future purposes although since he had not met deductible when these test claims were done the price will be different. Dexcom and Freestyle covered under part B, unable to give all copays because pt has not met deductible yet but will give a few for measure Ozempic $189.81, Trulicity, Mounjaro Rybelsus, Bydureon covered. Victoza PA Required. Januvia $330.27, Tradjenta covered. Jardiance $280.11, Farxiga covered.  SOliqua and Xultophy $35     Hypertension /CAD  Patient is meeting blood pressure goal of < 140/90mmHg.    Hyperlipidemia   Stable and LDL below 70.  With having a prior CV event plus multiple risk factors could consider an LDL goal of less than 55.    Mental Health   Anxiety, Depression, and Insomnia  Stable    Supplements   Stable    PLAN:                            Try to increase metformin to 2 tablets in the morning but wait until you have a good meal. Continue the 2 tablets after dinner.  Start taking glipizide in the morning with first good meal and increase to 10 mg daily. You can take two of the 5 mg tablets together until gone and I will send a new prescription in for the 10 mg tablet so only take one daily when you get your new supply.  Contact the [place] Prescription Assistance Program at [phone] to start applications for Ozempic and Jardiance. If approved for Ozempic, this could take the place of glipizide and we would decrease this gradually as Ozempic is increased. Jardiance would be in addition to your other medicines as it works differently.  Try to apply for extra help through social security - https://www.ssa.gov/medicare/part-d-extra-help  Start checking blood sugars at least one a week. Alternate between fasting and bedtime.  Sign up for [employer] if you can. I emailed you a link to activate.    Follow-up: Return in 4 weeks (on 6/2/2025).    SUBJECTIVE/OBJECTIVE:                          Rk Longoria is a 69 year old male seen for an initial visit. He was referred to me from Dr. Darrin Mendez.      Reason for visit: cost of SGLT-2 medications.    Atorvastatin - heard bad things about them  Are all meds necessary    Allergies/ADRs: Reviewed in chart  Past Medical History: Reviewed in chart  Tobacco: He reports that he quit smoking about 6 years ago. His smoking use included cigarettes. He has never used smokeless tobacco.  Alcohol: history of alcohol dependence    Medication  "Adherence/Access: no issues reported.    Diabetes   Metformin  mg in the morning and 1000 mg twice daily  Glipizide XL 5 mg once daily after dinner.   Aspirin 81 mg daily but doesn't take every day    Medication History: Jardiance 10 mg daily started by primary care provider in Nov 2024 but never started due to cost. He is not julian to pay for this with tight budget.     He has run into diarrhea if he doesn't   Current diabetes symptoms: polyuria, polydipsia, fatigue, and vision changes  He does not have family or personal history of medullary thyroid cancer. He does not have history of pancreatitis. Last eye exam showed no retinopathy.  Diet/Exercise: He gets 5,000 steps in daily. He reports he is not a good diabetic. He does have ice cream or a sweet after dinner most nights. He does have a lot of body pain. He does have eatable CBD/THC gummies in the afternoon and will get the \"munchies\". This is usually potato chips or popcorn. For the most part eats healthy with all three meals. He is not interested in meeting with a dietician. He says he is going to do what he wants to do when it comes to food.     Blood sugar monitoring: never  Eye exam is up to date  Foot exam: due    Hypertension /CAD  Lisinopril 40 mg daily  Amlodipine 10 mg once daily  Aspirin 81 mg daily  Nitroglycerin as needed for chest pain - never used    Patient reports no current medication side effects  Patient does not self-monitor blood pressure.       Hyperlipidemia   atorvastatin 40 mg daily    Patient reports no significant myalgias or other side effects.       Mental Health   Anxiety, Depression, and Insomnia  Escitalopram 10 mg once daily  Lorazepam 1 mg as needed - currently takes 2-3 times a week due to waking from a bad dream or if he is anxious about something else.  Trazodone 50 mg daily at bedtime.     Patient reports sometimes when he wakes in the morning is a little groggy but that goes a way after coffee. Trazodone helps him " fall asleep.   He does feel the escitalopram is helping with mood and anxiety. He feels that his mood is good.       Supplements   Vitamin B1 250 mg daily  Super B complex once daily  Vitamin D 1000 IU once daily    No reported issues at this time.          Today's Vitals: There were no vitals taken for this visit.  ----------------      I spent 53 minutes with this patient today. All changes were made via collaborative practice agreement with Darrin Mendez MD.     A summary of these recommendations was mailed to the patient.    Jessy Oleary, RafaelD  Medication Therapy Management Pharmacist    Telemedicine Visit Details  The patient's medications can be safely assessed via a telemedicine encounter.  Type of service:  Video Conference via [employer]  Originating Location (pt. Location): Home    Distant Location (provider location):  On-site  Start Time: 10:02 AM  End Time: 10:55 AM     Medication Therapy Recommendations  Type 2 diabetes mellitus with hyperglycemia, without long-term current use of insulin (H)   1 Current Medication: glipiZIDE (GLUCOTROL XL) 5 MG 24 hr tablet (Discontinued)   Current Medication Sig: Take 1 tablet (5 mg) by mouth 2 times daily (before meals).   Rationale: Dose too low - Dosage too low - Effectiveness   Recommendation: Increase Dose   Status: Accepted per CPA   Identified Date: 5/5/2025 Completed Date: 5/5/2025         2 Current Medication: metFORMIN (GLUCOPHAGE XR) 500 MG 24 hr tablet   Current Medication Sig: Take 2 tablets (1,000 mg) by mouth 2 times daily (with meals).   Rationale: Undesirable effect - Adverse medication event - Safety   Recommendation: Provide Education   Status: Patient Agreed - Adherence/Education   Identified Date: 5/5/2025 Completed Date: 5/5/2025

## 2025-05-28 DIAGNOSIS — F33.0 MDD (MAJOR DEPRESSIVE DISORDER), RECURRENT EPISODE, MILD: ICD-10-CM

## 2025-05-28 DIAGNOSIS — G47.00 INSOMNIA, UNSPECIFIED TYPE: ICD-10-CM

## 2025-05-29 RX ORDER — ESCITALOPRAM OXALATE 10 MG/1
10 TABLET ORAL DAILY
Qty: 90 TABLET | Refills: 0 | Status: SHIPPED | OUTPATIENT
Start: 2025-05-29

## 2025-05-29 RX ORDER — TRAZODONE HYDROCHLORIDE 50 MG/1
50 TABLET ORAL AT BEDTIME
Qty: 90 TABLET | Refills: 0 | Status: SHIPPED | OUTPATIENT
Start: 2025-05-29

## 2025-06-12 ENCOUNTER — TELEPHONE (OUTPATIENT)
Dept: FAMILY MEDICINE | Facility: CLINIC | Age: 70
End: 2025-06-12
Payer: MEDICARE

## 2025-06-12 NOTE — TELEPHONE ENCOUNTER
Called pt as Jessy is out of the office until Monday. No answer, voicemail letting him know Jessy is out and left my number in case he needed something more urgently.     Brenna Cuello, PharmD, ASAF, BCACP  MTM Pharmacist, [facility]

## 2025-06-14 ENCOUNTER — HEALTH MAINTENANCE LETTER (OUTPATIENT)
Age: 70
End: 2025-06-14

## 2025-06-18 ENCOUNTER — VIRTUAL VISIT (OUTPATIENT)
Dept: PHARMACY | Facility: CLINIC | Age: 70
End: 2025-06-18

## 2025-06-18 ENCOUNTER — TELEPHONE (OUTPATIENT)
Dept: PHARMACY | Facility: CLINIC | Age: 70
End: 2025-06-18
Payer: MEDICARE

## 2025-06-18 DIAGNOSIS — E11.65 TYPE 2 DIABETES MELLITUS WITH HYPERGLYCEMIA, WITHOUT LONG-TERM CURRENT USE OF INSULIN (H): Primary | ICD-10-CM

## 2025-06-18 PROCEDURE — 99207 PR NO CHARGE LOS: CPT | Mod: 93 | Performed by: PHARMACIST

## 2025-06-18 NOTE — TELEPHONE ENCOUNTER
Is this a new referral or dose change: new patient    Patient preferred phone number (please verify with pt): [phone]    Medications requested: Ozempic and Jardiance    Additional helpful info for PAP team: I asked him to call to start the application but if you could reach out and start the process that would be great.    Jessy Oleary, PharmD  Medication Therapy Management Pharmacist

## 2025-06-18 NOTE — PROGRESS NOTES
Medication Therapy Management (MTM) Encounter    ASSESSMENT:                            Medication Adherence/Access: No issues identified.    Diabetes  Patient is not meeting A1c goal of < 7%.  Patient would benefit from continuing to work on lifestyle modifications. We have discussed using a continuous glucose monitor but patient declined.  Recommended he try to check blood sugars more, provided him with log sheet today.  We discussed the cost of SGLT2's and GLP-1's.  Both medications would be too expensive for him at this time due to a tight budget and minimal income.  He would benefit from both Jardiance and Ozempic for diabetes, cardiovascular and renal.  He would be willing to apply for these medications through the manufacture to see if he qualifies.  I provided him with the prescription assistance office phone number again today.  With only a couple glucose readings, will not make changes today. He is due for diabetes foot exam and recommend he have this done at next provider visit. If He can't get Ozempic, Xultophy would be the next best option as it is both basal insulin and GLP-1 combined and only $35/month supply.    For future purposes although since he had not met deductible when these test claims were done the price will be different. Dexcom and Freestyle covered under part B, unable to give all copays because pt has not met deductible yet but will give a few for measure Ozempic $189.81, Trulicity, Mounjaro Rybelsus, Bydureon covered. Victoza PA Required. Januvia $330.27, Tradjenta covered. Jardiance $280.11, Farxiga covered. SOliqua and Xultophy $35.     PLAN:                            Contact the [place] Prescription Assistance Program at [phone] to start applications for Ozempic and Jardiance. If approved for Ozempic, this could take the place of glipizide and we would decrease this gradually as Ozempic is increased. Jardiance would be in addition to your other medicines as it works  "differently.  Try to apply for extra help through social security - https://www.ssa.gov/medicare/part-d-extra-help  Start checking blood sugars at least a few times a week. Alternate between fasting and bedtime. Use the log sheet I have provided.    Follow-up: Return in 6 weeks (on 7/30/2025).    SUBJECTIVE/OBJECTIVE:                          Rk Longoria is a 69 year old male seen for a follow-up visit from 5/5/25.       Reason for visit: follow-up diabetes .    Allergies/ADRs: Reviewed in chart  Past Medical History: Reviewed in chart  Tobacco: He reports that he quit smoking about 6 years ago. His smoking use included cigarettes. He has never used smokeless tobacco.  Alcohol: history of alcohol dependence    Medication Adherence/Access: Medication barriers: affording medications.     Diabetes   Metformin XR 1000 mg twice daily  Glipizide XL 10 mg once daily with first meal - increased at last [facility] visit  Aspirin 81 mg daily but doesn't take every day    Medication History: Jardiance 10 mg daily started by primary care provider in Nov 2024 but never started due to cost. He is not julian to pay for this with tight budget.     Reports feel pretty good since increasing metformin.   Current diabetes symptoms: polyuria, polydipsia, fatigue, and vision changes  He does not have family or personal history of medullary thyroid cancer. He does not have history of pancreatitis. Last eye exam showed no retinopathy.    Diet/Exercise: He has been waking every day over 1 mile each day. No change reported with diet. Copied forward - He does have ice cream or a sweet after dinner most nights. He does have a lot of body pain. He does have eatable CBD/THC gummies in the afternoon and will get the \"munchies\". This is usually potato chips or popcorn. For the most part eats healthy with all three meals. He is not interested in meeting with a dietician. He says he is going to do what he wants to do when it comes to food.       Blood " sugar monitorin time(s) daily; Ranges: (patient reported) Post-Prandial- 220, 214   Eye exam is up to date  Foot exam: due    Today's Vitals: There were no vitals taken for this visit.  ----------------      I spent 13 minutes with this patient today.      A summary of these recommendations was mailed to the patient.    Jessy Oleary PharmD  Medication Therapy Management Pharmacist    Telemedicine Visit Details  The patient's medications can be safely assessed via a telemedicine encounter.  Type of service:  Telephone visit  Originating Location (pt. Location): Home    Distant Location (provider location):  On-site  Start Time: 1:42 PM  End Time: 1:55 PM     Medication Therapy Recommendations  No medication therapy recommendations to display

## 2025-06-18 NOTE — TELEPHONE ENCOUNTER
Called and help schedule follow-up    Jessy Oleary, RafaelD  Medication Therapy Management Pharmacist

## 2025-06-18 NOTE — PATIENT INSTRUCTIONS
"Recommendations from today's MTM visit:                                                       Contact the [place] Prescription Assistance Program at [phone] to start applications for Ozempic and Jardiance. If approved for Ozempic, this could take the place of glipizide and we would decrease this gradually as Ozempic is increased. Jardiance would be in addition to your other medicines as it works differently.  Try to apply for extra help through social security - https://www.ssa.gov/medicare/part-d-extra-help  Start checking blood sugars at least a few times a week. Alternate between fasting and bedtime. Use the log sheet I have provided.    Follow-up: Return in 6 weeks (on 7/30/2025).    It was great speaking with you today.  I value your experience and would be very thankful for your time in providing feedback in our clinic survey. In the next few days, you may receive an email or text message from [employer] with a link to a survey related to your  clinical pharmacist.\"     To schedule another MTM appointment, please call the clinic directly or you may call the MTM scheduling line at [phone] or toll-free at [phone].     My Clinical Pharmacist's contact information:                                                      Please feel free to contact me with any questions or concerns you have.      Jessy Oleary, PharmD  Medication Therapy Management Pharmacist  [facility] - Monday and Wednesday 7:30 - 4:00    "

## 2025-06-24 ENCOUNTER — TELEPHONE (OUTPATIENT)
Dept: FAMILY MEDICINE | Facility: CLINIC | Age: 70
End: 2025-06-24
Payer: MEDICARE

## 2025-06-24 NOTE — TELEPHONE ENCOUNTER
Jardiance application and Ozempic application has been interofficed to Dr Mendez and mailed to Rk to review, sign, and send back to me.     We will note EPIC when sent to the manufacturers     Thank you!  Roya Rodriguez   Prescription

## 2025-06-25 ENCOUNTER — TELEPHONE (OUTPATIENT)
Dept: FAMILY MEDICINE | Facility: CLINIC | Age: 70
End: 2025-06-25
Payer: MEDICARE

## 2025-06-25 NOTE — TELEPHONE ENCOUNTER
Forms/Letter Request    Type of form/letter: OTHER:   [employer] Mail from [employer]  Re: RX Assist Program      Do we have the form/letter: Yes:  in basket     Who is the form from?   [employer]  Re: RX Assist Program     Where did/will the form come from? form was mailed in    When is form/letter needed by:  ASAP     How would you like the form/letter returned: Mail  Is this the correct address?: Yes  INTER OFFICE MAIL.  Yellow envelope was provided and filled out already.      Patient Notified form requests are processed in 5-7 business days:No    Could we send this information to you in [employer] or would you prefer to receive a phone call?:   No preference   Okay to leave a detailed message?: No   N/A    Natasha Bartholomew  Patient Representative  [facility]

## 2025-07-01 DIAGNOSIS — F32.0 MILD MAJOR DEPRESSION: ICD-10-CM

## 2025-07-01 RX ORDER — LORAZEPAM 1 MG/1
1 TABLET ORAL EVERY 6 HOURS
Qty: 12 TABLET | Refills: 0 | Status: SHIPPED | OUTPATIENT
Start: 2025-07-01

## 2025-08-02 DIAGNOSIS — F32.0 MILD MAJOR DEPRESSION: ICD-10-CM

## 2025-08-04 RX ORDER — LORAZEPAM 1 MG/1
1 TABLET ORAL EVERY 6 HOURS
Qty: 12 TABLET | Refills: 0 | OUTPATIENT
Start: 2025-08-04

## 2025-08-12 DIAGNOSIS — F32.0 MILD MAJOR DEPRESSION: ICD-10-CM

## 2025-08-12 RX ORDER — LORAZEPAM 1 MG/1
1 TABLET ORAL EVERY 6 HOURS
Qty: 12 TABLET | Refills: 0 | OUTPATIENT
Start: 2025-08-12

## 2025-08-26 DIAGNOSIS — G47.00 INSOMNIA, UNSPECIFIED TYPE: ICD-10-CM

## 2025-08-26 DIAGNOSIS — F33.0 MDD (MAJOR DEPRESSIVE DISORDER), RECURRENT EPISODE, MILD: ICD-10-CM

## 2025-08-26 RX ORDER — TRAZODONE HYDROCHLORIDE 50 MG/1
50 TABLET ORAL AT BEDTIME
Qty: 90 TABLET | Refills: 0 | Status: SHIPPED | OUTPATIENT
Start: 2025-08-26

## 2025-08-26 RX ORDER — ESCITALOPRAM OXALATE 10 MG/1
10 TABLET ORAL DAILY
Qty: 90 TABLET | Refills: 0 | Status: SHIPPED | OUTPATIENT
Start: 2025-08-26

## (undated) RX ORDER — NITROGLYCERIN 5 MG/ML
VIAL (ML) INTRAVENOUS
Status: DISPENSED
Start: 2018-10-23

## (undated) RX ORDER — FENTANYL CITRATE 50 UG/ML
INJECTION, SOLUTION INTRAMUSCULAR; INTRAVENOUS
Status: DISPENSED
Start: 2018-10-23

## (undated) RX ORDER — FENTANYL CITRATE 50 UG/ML
INJECTION, SOLUTION INTRAMUSCULAR; INTRAVENOUS
Status: DISPENSED
Start: 2018-09-21

## (undated) RX ORDER — LIDOCAINE HYDROCHLORIDE 10 MG/ML
INJECTION, SOLUTION EPIDURAL; INFILTRATION; INTRACAUDAL; PERINEURAL
Status: DISPENSED
Start: 2018-10-23

## (undated) RX ORDER — HEPARIN SODIUM 1000 [USP'U]/ML
INJECTION, SOLUTION INTRAVENOUS; SUBCUTANEOUS
Status: DISPENSED
Start: 2018-10-23

## (undated) RX ORDER — POTASSIUM CHLORIDE 1500 MG/1
TABLET, EXTENDED RELEASE ORAL
Status: DISPENSED
Start: 2018-10-23

## (undated) RX ORDER — LIDOCAINE HYDROCHLORIDE 10 MG/ML
INJECTION, SOLUTION EPIDURAL; INFILTRATION; INTRACAUDAL; PERINEURAL
Status: DISPENSED
Start: 2018-09-21

## (undated) RX ORDER — HEPARIN SODIUM 1000 [USP'U]/ML
INJECTION, SOLUTION INTRAVENOUS; SUBCUTANEOUS
Status: DISPENSED
Start: 2018-09-21

## (undated) RX ORDER — VERAPAMIL HYDROCHLORIDE 2.5 MG/ML
INJECTION, SOLUTION INTRAVENOUS
Status: DISPENSED
Start: 2018-09-21

## (undated) RX ORDER — NITROGLYCERIN 5 MG/ML
VIAL (ML) INTRAVENOUS
Status: DISPENSED
Start: 2018-09-21

## (undated) RX ORDER — VERAPAMIL HYDROCHLORIDE 2.5 MG/ML
INJECTION, SOLUTION INTRAVENOUS
Status: DISPENSED
Start: 2018-10-23